# Patient Record
Sex: FEMALE | Race: WHITE | Employment: OTHER | URBAN - METROPOLITAN AREA
[De-identification: names, ages, dates, MRNs, and addresses within clinical notes are randomized per-mention and may not be internally consistent; named-entity substitution may affect disease eponyms.]

---

## 2017-01-01 ENCOUNTER — TELEPHONE (OUTPATIENT)
Dept: FAMILY MEDICINE CLINIC | Age: 62
End: 2017-01-01

## 2017-01-01 ENCOUNTER — OFFICE VISIT (OUTPATIENT)
Dept: FAMILY MEDICINE CLINIC | Age: 62
End: 2017-01-01
Payer: COMMERCIAL

## 2017-01-01 ENCOUNTER — HOSPITAL ENCOUNTER (OUTPATIENT)
Age: 62
Setting detail: OBSERVATION
Discharge: NURSING, REHAB OR LONG-TERM CARE | End: 2017-04-14
Attending: EMERGENCY MEDICINE | Admitting: INTERNAL MEDICINE
Payer: COMMERCIAL

## 2017-01-01 ENCOUNTER — CARE COORDINATION (OUTPATIENT)
Dept: CARE COORDINATION | Age: 62
End: 2017-01-01

## 2017-01-01 ENCOUNTER — HOSPITAL ENCOUNTER (OUTPATIENT)
Age: 62
Setting detail: SPECIMEN
Discharge: HOME | End: 2017-04-23
Payer: COMMERCIAL

## 2017-01-01 ENCOUNTER — HOSPITAL ENCOUNTER (OUTPATIENT)
Age: 62
Setting detail: SPECIMEN
Discharge: HOME | End: 2017-05-23
Payer: COMMERCIAL

## 2017-01-01 VITALS
TEMPERATURE: 97.4 F | SYSTOLIC BLOOD PRESSURE: 138 MMHG | BODY MASS INDEX: 51.91 KG/M2 | DIASTOLIC BLOOD PRESSURE: 78 MMHG | HEART RATE: 97 BPM | RESPIRATION RATE: 20 BRPM | OXYGEN SATURATION: 97 % | WEIGHT: 293 LBS | HEIGHT: 63 IN

## 2017-01-01 VITALS
OXYGEN SATURATION: 95 % | TEMPERATURE: 97.1 F | SYSTOLIC BLOOD PRESSURE: 104 MMHG | RESPIRATION RATE: 18 BRPM | HEIGHT: 63 IN | HEART RATE: 91 BPM | BODY MASS INDEX: 51.91 KG/M2 | WEIGHT: 293 LBS | DIASTOLIC BLOOD PRESSURE: 52 MMHG

## 2017-01-01 VITALS
SYSTOLIC BLOOD PRESSURE: 149 MMHG | OXYGEN SATURATION: 96 % | DIASTOLIC BLOOD PRESSURE: 76 MMHG | RESPIRATION RATE: 18 BRPM | BODY MASS INDEX: 51.91 KG/M2 | HEIGHT: 63 IN | WEIGHT: 293 LBS | TEMPERATURE: 98 F | HEART RATE: 83 BPM

## 2017-01-01 DIAGNOSIS — B49 FUNGAL DISEASE: ICD-10-CM

## 2017-01-01 DIAGNOSIS — L03.115 CELLULITIS OF RIGHT LOWER EXTREMITY: Primary | ICD-10-CM

## 2017-01-01 DIAGNOSIS — I89.0 LYMPHEDEMA: Primary | ICD-10-CM

## 2017-01-01 DIAGNOSIS — Z76.89 ENCOUNTER TO ESTABLISH CARE: ICD-10-CM

## 2017-01-01 DIAGNOSIS — Z13.9 SCREENING: ICD-10-CM

## 2017-01-01 DIAGNOSIS — I89.0 LYMPHEDEMA: ICD-10-CM

## 2017-01-01 DIAGNOSIS — E66.01 MORBID OBESITY, UNSPECIFIED OBESITY TYPE (HCC): ICD-10-CM

## 2017-01-01 DIAGNOSIS — E03.9 HYPOTHYROIDISM, UNSPECIFIED TYPE: Primary | ICD-10-CM

## 2017-01-01 LAB
ABSOLUTE BANDS #: 0.11 K/UL (ref 0–1)
ABSOLUTE EOS #: 0.11 K/UL (ref 0–0.4)
ABSOLUTE EOS #: 0.3 K/UL (ref 0–0.4)
ABSOLUTE EOS #: 0.4 K/UL (ref 0–0.4)
ABSOLUTE LYMPH #: 0.64 K/UL (ref 1–4.8)
ABSOLUTE LYMPH #: 1.3 K/UL (ref 1–4.8)
ABSOLUTE LYMPH #: 1.5 K/UL (ref 1–4.8)
ABSOLUTE MONO #: 0.8 K/UL (ref 0.1–1.3)
ABSOLUTE MONO #: 0.9 K/UL (ref 0.1–1.3)
ABSOLUTE MONO #: 0.96 K/UL (ref 0.1–1.3)
ALBUMIN SERPL-MCNC: 3 G/DL (ref 3.5–5.2)
ALBUMIN/GLOBULIN RATIO: ABNORMAL (ref 1–2.5)
ALP BLD-CCNC: 65 U/L (ref 35–104)
ALT SERPL-CCNC: 26 U/L (ref 5–33)
ANION GAP SERPL CALCULATED.3IONS-SCNC: 11 MMOL/L (ref 9–17)
ANION GAP SERPL CALCULATED.3IONS-SCNC: 12 MMOL/L (ref 9–17)
ANION GAP SERPL CALCULATED.3IONS-SCNC: 19 MMOL/L (ref 9–17)
ANION GAP SERPL CALCULATED.3IONS-SCNC: 22 MMOL/L (ref 9–17)
ANION GAP SERPL CALCULATED.3IONS-SCNC: 9 MMOL/L (ref 9–17)
AST SERPL-CCNC: 30 U/L
BANDS: 1 % (ref 0–10)
BASOPHILS # BLD: 1 % (ref 0–2)
BASOPHILS ABSOLUTE: 0.1 K/UL (ref 0–0.2)
BASOPHILS ABSOLUTE: 0.1 K/UL (ref 0–0.2)
BASOPHILS ABSOLUTE: 0.11 K/UL (ref 0–0.2)
BILIRUB SERPL-MCNC: 0.98 MG/DL (ref 0.3–1.2)
BNP INTERPRETATION: ABNORMAL
BUN BLDV-MCNC: 17 MG/DL (ref 8–23)
BUN BLDV-MCNC: 18 MG/DL (ref 8–23)
BUN BLDV-MCNC: 25 MG/DL (ref 8–23)
BUN BLDV-MCNC: 28 MG/DL (ref 8–23)
BUN BLDV-MCNC: 33 MG/DL (ref 8–23)
BUN/CREAT BLD: 20 (ref 9–20)
BUN/CREAT BLD: ABNORMAL (ref 9–20)
C-REACTIVE PROTEIN: 119.7 MG/L (ref 0–5)
C-REACTIVE PROTEIN: 140.9 MG/L (ref 0–5)
CALCIUM SERPL-MCNC: 7.4 MG/DL (ref 8.6–10.4)
CALCIUM SERPL-MCNC: 7.8 MG/DL (ref 8.6–10.4)
CALCIUM SERPL-MCNC: 8 MG/DL (ref 8.6–10.4)
CALCIUM SERPL-MCNC: 8.6 MG/DL (ref 8.6–10.4)
CALCIUM SERPL-MCNC: 9.4 MG/DL (ref 8.6–10.4)
CHLORIDE BLD-SCNC: 102 MMOL/L (ref 98–107)
CHLORIDE BLD-SCNC: 105 MMOL/L (ref 98–107)
CHLORIDE BLD-SCNC: 96 MMOL/L (ref 98–107)
CHOLESTEROL/HDL RATIO: 3.7
CHOLESTEROL: 167 MG/DL
CO2: 20 MMOL/L (ref 20–31)
CO2: 22 MMOL/L (ref 20–31)
CO2: 26 MMOL/L (ref 20–31)
CREAT SERPL-MCNC: 0.74 MG/DL (ref 0.5–0.9)
CREAT SERPL-MCNC: 0.78 MG/DL (ref 0.5–0.9)
CREAT SERPL-MCNC: 0.82 MG/DL (ref 0.5–0.9)
CREAT SERPL-MCNC: 0.87 MG/DL (ref 0.5–0.9)
CREAT SERPL-MCNC: 1.02 MG/DL (ref 0.5–0.9)
DIFFERENTIAL TYPE: ABNORMAL
EOSINOPHILS RELATIVE PERCENT: 1 % (ref 0–4)
EOSINOPHILS RELATIVE PERCENT: 5 % (ref 0–4)
EOSINOPHILS RELATIVE PERCENT: 5 % (ref 0–4)
ESTIMATED AVERAGE GLUCOSE: 100 MG/DL
GFR AFRICAN AMERICAN: >60 ML/MIN
GFR NON-AFRICAN AMERICAN: 55 ML/MIN
GFR NON-AFRICAN AMERICAN: >60 ML/MIN
GFR SERPL CREATININE-BSD FRML MDRD: ABNORMAL ML/MIN/{1.73_M2}
GLUCOSE BLD-MCNC: 104 MG/DL (ref 65–105)
GLUCOSE BLD-MCNC: 104 MG/DL (ref 65–105)
GLUCOSE BLD-MCNC: 104 MG/DL (ref 70–99)
GLUCOSE BLD-MCNC: 105 MG/DL (ref 65–105)
GLUCOSE BLD-MCNC: 105 MG/DL (ref 65–105)
GLUCOSE BLD-MCNC: 107 MG/DL (ref 70–99)
GLUCOSE BLD-MCNC: 108 MG/DL (ref 65–105)
GLUCOSE BLD-MCNC: 108 MG/DL (ref 70–99)
GLUCOSE BLD-MCNC: 111 MG/DL (ref 70–99)
GLUCOSE BLD-MCNC: 119 MG/DL (ref 65–105)
GLUCOSE BLD-MCNC: 125 MG/DL (ref 70–99)
GLUCOSE BLD-MCNC: 132 MG/DL (ref 65–105)
GLUCOSE BLD-MCNC: 189 MG/DL (ref 65–105)
GLUCOSE BLD-MCNC: 85 MG/DL (ref 65–105)
HBA1C MFR BLD: 5.1 % (ref 4–6)
HCT VFR BLD CALC: 31.8 % (ref 36–46)
HCT VFR BLD CALC: 32.1 % (ref 36–46)
HCT VFR BLD CALC: 34.6 % (ref 36–46)
HCT VFR BLD CALC: 38.5 % (ref 36–46)
HDLC SERPL-MCNC: 45 MG/DL
HEMOGLOBIN: 10.7 G/DL (ref 12–16)
HEMOGLOBIN: 10.7 G/DL (ref 12–16)
HEMOGLOBIN: 11.2 G/DL (ref 12–16)
HEMOGLOBIN: 12.9 G/DL (ref 12–16)
HEPATITIS C ANTIBODY: NONREACTIVE
HIV AG/AB: NONREACTIVE
LDL CHOLESTEROL: 97 MG/DL (ref 0–130)
LV EF: 55 %
LVEF MODALITY: NORMAL
LYMPHOCYTES # BLD: 16 % (ref 24–44)
LYMPHOCYTES # BLD: 25 % (ref 24–44)
LYMPHOCYTES # BLD: 6 % (ref 24–44)
MCH RBC QN AUTO: 28.2 PG (ref 26–34)
MCH RBC QN AUTO: 28.6 PG (ref 26–34)
MCH RBC QN AUTO: 28.7 PG (ref 26–34)
MCH RBC QN AUTO: 28.8 PG (ref 26–34)
MCHC RBC AUTO-ENTMCNC: 32.3 G/DL (ref 31–37)
MCHC RBC AUTO-ENTMCNC: 33.3 G/DL (ref 31–37)
MCHC RBC AUTO-ENTMCNC: 33.5 G/DL (ref 31–37)
MCHC RBC AUTO-ENTMCNC: 33.6 G/DL (ref 31–37)
MCV RBC AUTO: 85.4 FL (ref 80–100)
MCV RBC AUTO: 85.8 FL (ref 80–100)
MCV RBC AUTO: 86.4 FL (ref 80–100)
MCV RBC AUTO: 87.3 FL (ref 80–100)
MONOCYTES # BLD: 11 % (ref 1–7)
MONOCYTES # BLD: 13 % (ref 1–7)
MONOCYTES # BLD: 9 % (ref 1–7)
MORPHOLOGY: NORMAL
PDW BLD-RTO: 12.6 % (ref 11.5–14.5)
PDW BLD-RTO: 13 % (ref 11.5–14.9)
PDW BLD-RTO: 13.1 % (ref 11.5–14.9)
PDW BLD-RTO: 13.2 % (ref 11.5–14.9)
PLATELET # BLD: 205 K/UL (ref 130–400)
PLATELET # BLD: 209 K/UL (ref 150–450)
PLATELET # BLD: 220 K/UL (ref 150–450)
PLATELET # BLD: 256 K/UL (ref 150–450)
PLATELET ESTIMATE: ABNORMAL
PMV BLD AUTO: 8.3 FL (ref 6–12)
PMV BLD AUTO: 8.5 FL (ref 6–12)
PMV BLD AUTO: 8.7 FL (ref 6–12)
PMV BLD AUTO: ABNORMAL FL (ref 6–12)
POTASSIUM SERPL-SCNC: 3.3 MMOL/L (ref 3.7–5.3)
POTASSIUM SERPL-SCNC: 3.4 MMOL/L (ref 3.7–5.3)
POTASSIUM SERPL-SCNC: 3.6 MMOL/L (ref 3.7–5.3)
POTASSIUM SERPL-SCNC: 4.1 MMOL/L (ref 3.7–5.3)
POTASSIUM SERPL-SCNC: 4.1 MMOL/L (ref 3.7–5.3)
PRO-BNP: 690 PG/ML
RBC # BLD: 3.71 M/UL (ref 4–5.2)
RBC # BLD: 3.71 M/UL (ref 4–5.2)
RBC # BLD: 3.96 M/UL (ref 4–5.2)
RBC # BLD: 4.51 M/UL (ref 4–5.2)
RBC # BLD: ABNORMAL 10*6/UL
SEG NEUTROPHILS: 56 % (ref 36–66)
SEG NEUTROPHILS: 67 % (ref 36–66)
SEG NEUTROPHILS: 82 % (ref 36–66)
SEGMENTED NEUTROPHILS ABSOLUTE COUNT: 3.5 K/UL (ref 1.3–9.1)
SEGMENTED NEUTROPHILS ABSOLUTE COUNT: 5.3 K/UL (ref 1.3–9.1)
SEGMENTED NEUTROPHILS ABSOLUTE COUNT: 8.77 K/UL (ref 1.3–9.1)
SODIUM BLD-SCNC: 138 MMOL/L (ref 135–144)
SODIUM BLD-SCNC: 139 MMOL/L (ref 135–144)
SODIUM BLD-SCNC: 140 MMOL/L (ref 135–144)
SODIUM BLD-SCNC: 140 MMOL/L (ref 135–144)
SODIUM BLD-SCNC: 143 MMOL/L (ref 135–144)
TOTAL PROTEIN: 7 G/DL (ref 6.4–8.3)
TRIGL SERPL-MCNC: 127 MG/DL
TSH SERPL DL<=0.05 MIU/L-ACNC: 7.28 MIU/L (ref 0.3–5)
VANCOMYCIN TROUGH DATE LAST DOSE: NORMAL
VANCOMYCIN TROUGH DOSE AMOUNT: NORMAL
VANCOMYCIN TROUGH TIME LAST DOSE: 1708
VANCOMYCIN TROUGH: 19.8 UG/ML (ref 10–20)
VLDLC SERPL CALC-MCNC: NORMAL MG/DL (ref 1–30)
WBC # BLD: 10.7 K/UL (ref 3.5–11)
WBC # BLD: 6.2 K/UL (ref 3.5–11)
WBC # BLD: 6.6 K/UL (ref 3.5–11)
WBC # BLD: 7.8 K/UL (ref 3.5–11)
WBC # BLD: ABNORMAL 10*3/UL

## 2017-01-01 PROCEDURE — 6370000000 HC RX 637 (ALT 250 FOR IP): Performed by: HOSPITALIST

## 2017-01-01 PROCEDURE — 99251 PR INITL INPATIENT CONSULT NEW/ESTAB PT 20 MIN: CPT | Performed by: NURSE PRACTITIONER

## 2017-01-01 PROCEDURE — 85025 COMPLETE CBC W/AUTO DIFF WBC: CPT

## 2017-01-01 PROCEDURE — 3014F SCREEN MAMMO DOC REV: CPT | Performed by: NURSE PRACTITIONER

## 2017-01-01 PROCEDURE — G8427 DOCREV CUR MEDS BY ELIG CLIN: HCPCS | Performed by: NURSE PRACTITIONER

## 2017-01-01 PROCEDURE — P9603 ONE-WAY ALLOW PRORATED MILES: HCPCS

## 2017-01-01 PROCEDURE — 96366 THER/PROPH/DIAG IV INF ADDON: CPT

## 2017-01-01 PROCEDURE — 83036 HEMOGLOBIN GLYCOSYLATED A1C: CPT

## 2017-01-01 PROCEDURE — 82947 ASSAY GLUCOSE BLOOD QUANT: CPT

## 2017-01-01 PROCEDURE — 1036F TOBACCO NON-USER: CPT | Performed by: NURSE PRACTITIONER

## 2017-01-01 PROCEDURE — 3017F COLORECTAL CA SCREEN DOC REV: CPT | Performed by: NURSE PRACTITIONER

## 2017-01-01 PROCEDURE — 6360000002 HC RX W HCPCS: Performed by: HOSPITALIST

## 2017-01-01 PROCEDURE — 96372 THER/PROPH/DIAG INJ SC/IM: CPT

## 2017-01-01 PROCEDURE — G8417 CALC BMI ABV UP PARAM F/U: HCPCS | Performed by: NURSE PRACTITIONER

## 2017-01-01 PROCEDURE — 99225 PR SBSQ OBSERVATION CARE/DAY 25 MINUTES: CPT | Performed by: INTERNAL MEDICINE

## 2017-01-01 PROCEDURE — 86140 C-REACTIVE PROTEIN: CPT

## 2017-01-01 PROCEDURE — G0378 HOSPITAL OBSERVATION PER HR: HCPCS

## 2017-01-01 PROCEDURE — 97162 PT EVAL MOD COMPLEX 30 MIN: CPT

## 2017-01-01 PROCEDURE — 97110 THERAPEUTIC EXERCISES: CPT

## 2017-01-01 PROCEDURE — 99220 PR INITIAL OBSERVATION CARE/DAY 70 MINUTES: CPT | Performed by: INTERNAL MEDICINE

## 2017-01-01 PROCEDURE — G8979 MOBILITY GOAL STATUS: HCPCS

## 2017-01-01 PROCEDURE — 99217 PR OBSERVATION CARE DISCHARGE MANAGEMENT: CPT | Performed by: INTERNAL MEDICINE

## 2017-01-01 PROCEDURE — 2580000003 HC RX 258: Performed by: HOSPITALIST

## 2017-01-01 PROCEDURE — 6370000000 HC RX 637 (ALT 250 FOR IP): Performed by: INTERNAL MEDICINE

## 2017-01-01 PROCEDURE — G8988 SELF CARE GOAL STATUS: HCPCS

## 2017-01-01 PROCEDURE — 93970 EXTREMITY STUDY: CPT

## 2017-01-01 PROCEDURE — 97166 OT EVAL MOD COMPLEX 45 MIN: CPT

## 2017-01-01 PROCEDURE — 85027 COMPLETE CBC AUTOMATED: CPT

## 2017-01-01 PROCEDURE — 36415 COLL VENOUS BLD VENIPUNCTURE: CPT

## 2017-01-01 PROCEDURE — 2500000003 HC RX 250 WO HCPCS: Performed by: INTERNAL MEDICINE

## 2017-01-01 PROCEDURE — G8987 SELF CARE CURRENT STATUS: HCPCS

## 2017-01-01 PROCEDURE — 99231 SBSQ HOSP IP/OBS SF/LOW 25: CPT | Performed by: NURSE PRACTITIONER

## 2017-01-01 PROCEDURE — 80048 BASIC METABOLIC PNL TOTAL CA: CPT

## 2017-01-01 PROCEDURE — 6360000002 HC RX W HCPCS: Performed by: INTERNAL MEDICINE

## 2017-01-01 PROCEDURE — 99204 OFFICE O/P NEW MOD 45 MIN: CPT | Performed by: NURSE PRACTITIONER

## 2017-01-01 PROCEDURE — 96375 TX/PRO/DX INJ NEW DRUG ADDON: CPT

## 2017-01-01 PROCEDURE — 96376 TX/PRO/DX INJ SAME DRUG ADON: CPT

## 2017-01-01 PROCEDURE — 80053 COMPREHEN METABOLIC PANEL: CPT

## 2017-01-01 PROCEDURE — 96367 TX/PROPH/DG ADDL SEQ IV INF: CPT

## 2017-01-01 PROCEDURE — 99213 OFFICE O/P EST LOW 20 MIN: CPT | Performed by: NURSE PRACTITIONER

## 2017-01-01 PROCEDURE — 83880 ASSAY OF NATRIURETIC PEPTIDE: CPT

## 2017-01-01 PROCEDURE — 93306 TTE W/DOPPLER COMPLETE: CPT

## 2017-01-01 PROCEDURE — 80202 ASSAY OF VANCOMYCIN: CPT

## 2017-01-01 PROCEDURE — 96365 THER/PROPH/DIAG IV INF INIT: CPT

## 2017-01-01 PROCEDURE — 99284 EMERGENCY DEPT VISIT MOD MDM: CPT

## 2017-01-01 PROCEDURE — 2500000003 HC RX 250 WO HCPCS: Performed by: EMERGENCY MEDICINE

## 2017-01-01 PROCEDURE — 2580000003 HC RX 258: Performed by: EMERGENCY MEDICINE

## 2017-01-01 PROCEDURE — G8978 MOBILITY CURRENT STATUS: HCPCS

## 2017-01-01 RX ORDER — SODIUM CHLORIDE 0.9 % (FLUSH) 0.9 %
10 SYRINGE (ML) INJECTION PRN
Status: DISCONTINUED | OUTPATIENT
Start: 2017-01-01 | End: 2017-01-01

## 2017-01-01 RX ORDER — FUROSEMIDE 20 MG/1
TABLET ORAL
Qty: 90 TABLET | Refills: 1 | Status: SHIPPED | OUTPATIENT
Start: 2017-01-01

## 2017-01-01 RX ORDER — HYDROCODONE BITARTRATE AND ACETAMINOPHEN 5; 325 MG/1; MG/1
2 TABLET ORAL EVERY 4 HOURS PRN
Status: DISCONTINUED | OUTPATIENT
Start: 2017-01-01 | End: 2017-01-01

## 2017-01-01 RX ORDER — FUROSEMIDE 20 MG/1
TABLET ORAL
Qty: 30 TABLET | Refills: 2 | Status: SHIPPED | OUTPATIENT
Start: 2017-01-01 | End: 2017-01-01 | Stop reason: SDUPTHER

## 2017-01-01 RX ORDER — ONDANSETRON 2 MG/ML
4 INJECTION INTRAMUSCULAR; INTRAVENOUS EVERY 6 HOURS PRN
Status: DISCONTINUED | OUTPATIENT
Start: 2017-01-01 | End: 2017-01-01 | Stop reason: HOSPADM

## 2017-01-01 RX ORDER — ACETAMINOPHEN 325 MG/1
650 TABLET ORAL EVERY 4 HOURS PRN
Status: DISCONTINUED | OUTPATIENT
Start: 2017-01-01 | End: 2017-01-01 | Stop reason: HOSPADM

## 2017-01-01 RX ORDER — POTASSIUM CHLORIDE 20 MEQ/1
40 TABLET, EXTENDED RELEASE ORAL PRN
Status: DISCONTINUED | OUTPATIENT
Start: 2017-01-01 | End: 2017-01-01 | Stop reason: HOSPADM

## 2017-01-01 RX ORDER — DOXYCYCLINE HYCLATE 100 MG
100 TABLET ORAL 2 TIMES DAILY
Qty: 14 TABLET | Refills: 0 | Status: SHIPPED | OUTPATIENT
Start: 2017-01-01 | End: 2017-01-01

## 2017-01-01 RX ORDER — ACETAMINOPHEN 325 MG/1
650 TABLET ORAL EVERY 4 HOURS PRN
Status: DISCONTINUED | OUTPATIENT
Start: 2017-01-01 | End: 2017-01-01

## 2017-01-01 RX ORDER — SODIUM CHLORIDE 9 MG/ML
INJECTION, SOLUTION INTRAVENOUS CONTINUOUS
Status: DISCONTINUED | OUTPATIENT
Start: 2017-01-01 | End: 2017-01-01 | Stop reason: HOSPADM

## 2017-01-01 RX ORDER — NICOTINE POLACRILEX 4 MG
15 LOZENGE BUCCAL PRN
Status: DISCONTINUED | OUTPATIENT
Start: 2017-01-01 | End: 2017-01-01 | Stop reason: HOSPADM

## 2017-01-01 RX ORDER — LEVOTHYROXINE SODIUM 0.03 MG/1
25 TABLET ORAL DAILY
Qty: 30 TABLET | Refills: 3 | Status: SHIPPED | OUTPATIENT
Start: 2017-01-01 | End: 2018-01-01

## 2017-01-01 RX ORDER — POTASSIUM CHLORIDE 20MEQ/15ML
40 LIQUID (ML) ORAL PRN
Status: DISCONTINUED | OUTPATIENT
Start: 2017-01-01 | End: 2017-01-01 | Stop reason: HOSPADM

## 2017-01-01 RX ORDER — FUROSEMIDE 20 MG/1
20 TABLET ORAL DAILY
Qty: 90 TABLET | Refills: 1 | Status: SHIPPED | OUTPATIENT
Start: 2017-01-01 | End: 2017-01-01 | Stop reason: SDUPTHER

## 2017-01-01 RX ORDER — DEXTROSE MONOHYDRATE 50 MG/ML
100 INJECTION, SOLUTION INTRAVENOUS PRN
Status: DISCONTINUED | OUTPATIENT
Start: 2017-01-01 | End: 2017-01-01 | Stop reason: HOSPADM

## 2017-01-01 RX ORDER — POTASSIUM CHLORIDE 7.45 MG/ML
10 INJECTION INTRAVENOUS PRN
Status: DISCONTINUED | OUTPATIENT
Start: 2017-01-01 | End: 2017-01-01 | Stop reason: HOSPADM

## 2017-01-01 RX ORDER — NYSTATIN 100000 U/G
CREAM TOPICAL 2 TIMES DAILY
Status: DISCONTINUED | OUTPATIENT
Start: 2017-01-01 | End: 2017-01-01 | Stop reason: HOSPADM

## 2017-01-01 RX ORDER — MORPHINE SULFATE 4 MG/ML
4 INJECTION, SOLUTION INTRAMUSCULAR; INTRAVENOUS
Status: DISCONTINUED | OUTPATIENT
Start: 2017-01-01 | End: 2017-01-01

## 2017-01-01 RX ORDER — MORPHINE SULFATE 2 MG/ML
2 INJECTION, SOLUTION INTRAMUSCULAR; INTRAVENOUS
Status: DISCONTINUED | OUTPATIENT
Start: 2017-01-01 | End: 2017-01-01

## 2017-01-01 RX ORDER — BISACODYL 10 MG
10 SUPPOSITORY, RECTAL RECTAL DAILY PRN
Status: DISCONTINUED | OUTPATIENT
Start: 2017-01-01 | End: 2017-01-01 | Stop reason: HOSPADM

## 2017-01-01 RX ORDER — POTASSIUM CHLORIDE 1500 MG/1
TABLET, FILM COATED, EXTENDED RELEASE ORAL
Qty: 30 TABLET | Refills: 2 | Status: ON HOLD | OUTPATIENT
Start: 2017-01-01 | End: 2018-01-01 | Stop reason: HOSPADM

## 2017-01-01 RX ORDER — POTASSIUM CHLORIDE 20 MEQ/1
40 TABLET, EXTENDED RELEASE ORAL PRN
Status: DISCONTINUED | OUTPATIENT
Start: 2017-01-01 | End: 2017-01-01 | Stop reason: SDUPTHER

## 2017-01-01 RX ORDER — POTASSIUM CHLORIDE 20 MEQ/1
20 TABLET, EXTENDED RELEASE ORAL DAILY
Qty: 90 TABLET | Refills: 1 | Status: SHIPPED | OUTPATIENT
Start: 2017-01-01 | End: 2017-01-01 | Stop reason: SDUPTHER

## 2017-01-01 RX ORDER — DEXTROSE MONOHYDRATE 25 G/50ML
12.5 INJECTION, SOLUTION INTRAVENOUS PRN
Status: DISCONTINUED | OUTPATIENT
Start: 2017-01-01 | End: 2017-01-01 | Stop reason: HOSPADM

## 2017-01-01 RX ORDER — POTASSIUM CHLORIDE 20MEQ/15ML
40 LIQUID (ML) ORAL PRN
Status: DISCONTINUED | OUTPATIENT
Start: 2017-01-01 | End: 2017-01-01 | Stop reason: SDUPTHER

## 2017-01-01 RX ORDER — POTASSIUM CHLORIDE 7.45 MG/ML
10 INJECTION INTRAVENOUS PRN
Status: DISCONTINUED | OUTPATIENT
Start: 2017-01-01 | End: 2017-01-01 | Stop reason: SDUPTHER

## 2017-01-01 RX ORDER — MAGNESIUM SULFATE 1 G/100ML
1 INJECTION INTRAVENOUS PRN
Status: DISCONTINUED | OUTPATIENT
Start: 2017-01-01 | End: 2017-01-01 | Stop reason: HOSPADM

## 2017-01-01 RX ORDER — FUROSEMIDE 10 MG/ML
20 INJECTION INTRAMUSCULAR; INTRAVENOUS DAILY
Status: DISCONTINUED | OUTPATIENT
Start: 2017-01-01 | End: 2017-01-01 | Stop reason: HOSPADM

## 2017-01-01 RX ORDER — POTASSIUM CHLORIDE 20 MEQ/1
TABLET, EXTENDED RELEASE ORAL
Qty: 90 TABLET | Refills: 1 | Status: SHIPPED | OUTPATIENT
Start: 2017-01-01

## 2017-01-01 RX ORDER — POTASSIUM CHLORIDE 20 MEQ/1
20 TABLET, EXTENDED RELEASE ORAL DAILY
Qty: 60 TABLET | Refills: 3 | Status: SHIPPED | OUTPATIENT
Start: 2017-01-01 | End: 2017-01-01 | Stop reason: SDUPTHER

## 2017-01-01 RX ORDER — FUROSEMIDE 20 MG/1
20 TABLET ORAL DAILY
Qty: 60 TABLET | Refills: 3 | Status: SHIPPED | OUTPATIENT
Start: 2017-01-01 | End: 2017-01-01 | Stop reason: SDUPTHER

## 2017-01-01 RX ORDER — SODIUM CHLORIDE 0.9 % (FLUSH) 0.9 %
10 SYRINGE (ML) INJECTION EVERY 12 HOURS SCHEDULED
Status: DISCONTINUED | OUTPATIENT
Start: 2017-01-01 | End: 2017-01-01

## 2017-01-01 RX ORDER — HYDROCODONE BITARTRATE AND ACETAMINOPHEN 5; 325 MG/1; MG/1
1 TABLET ORAL EVERY 4 HOURS PRN
Status: DISCONTINUED | OUTPATIENT
Start: 2017-01-01 | End: 2017-01-01

## 2017-01-01 RX ADMIN — FUROSEMIDE 20 MG: 10 INJECTION, SOLUTION INTRAVENOUS at 08:53

## 2017-01-01 RX ADMIN — VANCOMYCIN HYDROCHLORIDE 1500 MG: 5 INJECTION, POWDER, LYOPHILIZED, FOR SOLUTION INTRAVENOUS at 16:53

## 2017-01-01 RX ADMIN — POTASSIUM CHLORIDE 40 MEQ: 20 TABLET, EXTENDED RELEASE ORAL at 09:40

## 2017-01-01 RX ADMIN — MICONAZOLE NITRATE: 1.42 POWDER TOPICAL at 23:00

## 2017-01-01 RX ADMIN — VANCOMYCIN HYDROCHLORIDE 1500 MG: 5 INJECTION, POWDER, LYOPHILIZED, FOR SOLUTION INTRAVENOUS at 17:08

## 2017-01-01 RX ADMIN — VANCOMYCIN HYDROCHLORIDE 1500 MG: 5 INJECTION, POWDER, LYOPHILIZED, FOR SOLUTION INTRAVENOUS at 06:11

## 2017-01-01 RX ADMIN — INSULIN LISPRO 1 UNITS: 100 INJECTION, SOLUTION INTRAVENOUS; SUBCUTANEOUS at 21:35

## 2017-01-01 RX ADMIN — ENOXAPARIN SODIUM 40 MG: 40 INJECTION SUBCUTANEOUS at 22:59

## 2017-01-01 RX ADMIN — DEXTROSE MONOHYDRATE 900 MG: 50 INJECTION, SOLUTION INTRAVENOUS at 13:57

## 2017-01-01 RX ADMIN — ENOXAPARIN SODIUM 40 MG: 40 INJECTION SUBCUTANEOUS at 09:40

## 2017-01-01 RX ADMIN — ENOXAPARIN SODIUM 40 MG: 40 INJECTION SUBCUTANEOUS at 21:36

## 2017-01-01 RX ADMIN — FUROSEMIDE 20 MG: 10 INJECTION, SOLUTION INTRAVENOUS at 16:29

## 2017-01-01 RX ADMIN — ENOXAPARIN SODIUM 40 MG: 40 INJECTION SUBCUTANEOUS at 08:54

## 2017-01-01 RX ADMIN — NYSTATIN: 100000 CREAM TOPICAL at 09:43

## 2017-01-01 RX ADMIN — VANCOMYCIN HYDROCHLORIDE 1500 MG: 5 INJECTION, POWDER, LYOPHILIZED, FOR SOLUTION INTRAVENOUS at 05:02

## 2017-01-01 RX ADMIN — SODIUM CHLORIDE: 9 INJECTION, SOLUTION INTRAVENOUS at 23:06

## 2017-01-01 RX ADMIN — ACETAMINOPHEN 650 MG: 325 TABLET ORAL at 18:26

## 2017-01-01 RX ADMIN — SODIUM CHLORIDE: 9 INJECTION, SOLUTION INTRAVENOUS at 16:27

## 2017-01-01 RX ADMIN — MICONAZOLE NITRATE: 1.42 POWDER TOPICAL at 08:56

## 2017-01-01 RX ADMIN — MICONAZOLE NITRATE: 1.42 POWDER TOPICAL at 22:09

## 2017-01-01 RX ADMIN — NYSTATIN: 100000 CREAM TOPICAL at 22:09

## 2017-01-01 RX ADMIN — POTASSIUM CHLORIDE 40 MEQ: 20 TABLET, EXTENDED RELEASE ORAL at 16:25

## 2017-01-01 RX ADMIN — NYSTATIN: 100000 CREAM TOPICAL at 23:01

## 2017-01-01 RX ADMIN — MICONAZOLE NITRATE: 1.42 POWDER TOPICAL at 09:43

## 2017-01-01 RX ADMIN — SODIUM CHLORIDE: 9 INJECTION, SOLUTION INTRAVENOUS at 16:26

## 2017-01-01 ASSESSMENT — PAIN SCALES - GENERAL
PAINLEVEL_OUTOF10: 2
PAINLEVEL_OUTOF10: 0
PAINLEVEL_OUTOF10: 2
PAINLEVEL_OUTOF10: 3

## 2017-01-01 ASSESSMENT — PATIENT HEALTH QUESTIONNAIRE - PHQ9
SUM OF ALL RESPONSES TO PHQ QUESTIONS 1-9: 0
1. LITTLE INTEREST OR PLEASURE IN DOING THINGS: 0
SUM OF ALL RESPONSES TO PHQ9 QUESTIONS 1 & 2: 0
2. FEELING DOWN, DEPRESSED OR HOPELESS: 0

## 2017-01-01 ASSESSMENT — PAIN DESCRIPTION - LOCATION
LOCATION: KNEE
LOCATION: KNEE
LOCATION: KNEE;HIP
LOCATION: LEG
LOCATION: GENERALIZED

## 2017-01-01 ASSESSMENT — ENCOUNTER SYMPTOMS
BLOOD IN STOOL: 0
EYE DISCHARGE: 0
RHINORRHEA: 0
ABDOMINAL PAIN: 0
SHORTNESS OF BREATH: 0
CHEST TIGHTNESS: 0
EYE PAIN: 0
NAUSEA: 0
ABDOMINAL PAIN: 0
COUGH: 0
COUGH: 0
DIARRHEA: 0
RHINORRHEA: 0
CHEST TIGHTNESS: 0
BACK PAIN: 0
SINUS PRESSURE: 0
SORE THROAT: 0
WHEEZING: 0
DIARRHEA: 1
CONSTIPATION: 0
EYE DISCHARGE: 0
RHINORRHEA: 0
SINUS PRESSURE: 0
ABDOMINAL PAIN: 0
SHORTNESS OF BREATH: 0
COUGH: 0
SHORTNESS OF BREATH: 0
BLOOD IN STOOL: 0
VOMITING: 0
EYE REDNESS: 0

## 2017-01-01 ASSESSMENT — PAIN DESCRIPTION - FREQUENCY
FREQUENCY: INTERMITTENT
FREQUENCY: INTERMITTENT

## 2017-01-01 ASSESSMENT — PAIN DESCRIPTION - PAIN TYPE
TYPE: ACUTE PAIN
TYPE: CHRONIC PAIN;ACUTE PAIN

## 2017-01-01 ASSESSMENT — PAIN DESCRIPTION - DESCRIPTORS
DESCRIPTORS: ACHING

## 2017-01-01 ASSESSMENT — PAIN DESCRIPTION - ORIENTATION
ORIENTATION: RIGHT
ORIENTATION: RIGHT;LEFT
ORIENTATION: LEFT;RIGHT
ORIENTATION: RIGHT
ORIENTATION: RIGHT

## 2017-04-12 PROBLEM — E66.01 MORBID OBESITY (HCC): Status: ACTIVE | Noted: 2017-01-01

## 2017-04-12 PROBLEM — R23.9 ALTERATION IN SKIN INTEGRITY DUE TO MOISTURE: Status: ACTIVE | Noted: 2017-01-01

## 2017-04-12 PROBLEM — L03.90 CELLULITIS: Status: ACTIVE | Noted: 2017-01-01

## 2017-04-12 PROBLEM — R68.89 SUSPECTED DEEP TISSUE INJURY: Status: ACTIVE | Noted: 2017-01-01

## 2017-04-12 PROBLEM — L03.115 CELLULITIS OF RIGHT LOWER EXTREMITY: Status: ACTIVE | Noted: 2017-01-01

## 2017-05-24 PROBLEM — E03.9 HYPOTHYROIDISM: Status: ACTIVE | Noted: 2017-01-01

## 2018-01-01 ENCOUNTER — TELEPHONE (OUTPATIENT)
Dept: ONCOLOGY | Age: 63
End: 2018-01-01

## 2018-01-01 ENCOUNTER — HOSPITAL ENCOUNTER (OUTPATIENT)
Facility: MEDICAL CENTER | Age: 63
Discharge: HOME | End: 2018-03-21
Payer: COMMERCIAL

## 2018-01-01 ENCOUNTER — APPOINTMENT (OUTPATIENT)
Dept: ULTRASOUND IMAGING | Age: 63
DRG: 754 | End: 2018-01-01
Payer: COMMERCIAL

## 2018-01-01 ENCOUNTER — HOSPITAL ENCOUNTER (OUTPATIENT)
Facility: MEDICAL CENTER | Age: 63
End: 2018-01-01

## 2018-01-01 ENCOUNTER — APPOINTMENT (OUTPATIENT)
Dept: CT IMAGING | Age: 63
DRG: 755 | End: 2018-01-01
Payer: COMMERCIAL

## 2018-01-01 ENCOUNTER — ANESTHESIA (OUTPATIENT)
Dept: OPERATING ROOM | Age: 63
DRG: 754 | End: 2018-01-01
Payer: COMMERCIAL

## 2018-01-01 ENCOUNTER — APPOINTMENT (OUTPATIENT)
Dept: GENERAL RADIOLOGY | Age: 63
DRG: 754 | End: 2018-01-01
Payer: COMMERCIAL

## 2018-01-01 ENCOUNTER — APPOINTMENT (OUTPATIENT)
Dept: INTERVENTIONAL RADIOLOGY/VASCULAR | Age: 63
DRG: 754 | End: 2018-01-01
Payer: COMMERCIAL

## 2018-01-01 ENCOUNTER — OFFICE VISIT (OUTPATIENT)
Dept: ONCOLOGY | Age: 63
End: 2018-01-01
Payer: COMMERCIAL

## 2018-01-01 ENCOUNTER — HOSPITAL ENCOUNTER (INPATIENT)
Age: 63
LOS: 3 days | Discharge: HOME | DRG: 755 | End: 2018-02-17
Attending: EMERGENCY MEDICINE | Admitting: INTERNAL MEDICINE
Payer: COMMERCIAL

## 2018-01-01 ENCOUNTER — HOSPITAL ENCOUNTER (OUTPATIENT)
Facility: MEDICAL CENTER | Age: 63
End: 2018-01-01
Payer: COMMERCIAL

## 2018-01-01 ENCOUNTER — HOSPITAL ENCOUNTER (INPATIENT)
Age: 63
LOS: 2 days | Discharge: HOME | DRG: 755 | End: 2018-01-25
Attending: EMERGENCY MEDICINE | Admitting: INTERNAL MEDICINE
Payer: COMMERCIAL

## 2018-01-01 ENCOUNTER — TELEPHONE (OUTPATIENT)
Dept: FAMILY MEDICINE CLINIC | Age: 63
End: 2018-01-01

## 2018-01-01 ENCOUNTER — APPOINTMENT (OUTPATIENT)
Dept: ULTRASOUND IMAGING | Age: 63
DRG: 755 | End: 2018-01-01
Payer: COMMERCIAL

## 2018-01-01 ENCOUNTER — ANESTHESIA EVENT (OUTPATIENT)
Dept: INTERVENTIONAL RADIOLOGY/VASCULAR | Age: 63
DRG: 754 | End: 2018-01-01
Payer: COMMERCIAL

## 2018-01-01 ENCOUNTER — APPOINTMENT (OUTPATIENT)
Dept: DIALYSIS | Age: 63
DRG: 754 | End: 2018-01-01
Payer: COMMERCIAL

## 2018-01-01 ENCOUNTER — APPOINTMENT (OUTPATIENT)
Dept: GENERAL RADIOLOGY | Age: 63
DRG: 755 | End: 2018-01-01
Payer: COMMERCIAL

## 2018-01-01 ENCOUNTER — APPOINTMENT (OUTPATIENT)
Dept: INTERVENTIONAL RADIOLOGY/VASCULAR | Age: 63
DRG: 755 | End: 2018-01-01
Payer: COMMERCIAL

## 2018-01-01 ENCOUNTER — ANESTHESIA (OUTPATIENT)
Dept: INTERVENTIONAL RADIOLOGY/VASCULAR | Age: 63
DRG: 754 | End: 2018-01-01
Payer: COMMERCIAL

## 2018-01-01 ENCOUNTER — ANESTHESIA EVENT (OUTPATIENT)
Dept: OPERATING ROOM | Age: 63
DRG: 754 | End: 2018-01-01
Payer: COMMERCIAL

## 2018-01-01 ENCOUNTER — HOSPITAL ENCOUNTER (INPATIENT)
Age: 63
LOS: 20 days | Discharge: NURSING, REHAB OR LONG-TERM CARE | DRG: 754 | End: 2018-03-14
Attending: EMERGENCY MEDICINE | Admitting: INTERNAL MEDICINE
Payer: COMMERCIAL

## 2018-01-01 ENCOUNTER — TELEPHONE (OUTPATIENT)
Dept: INFUSION THERAPY | Age: 63
End: 2018-01-01

## 2018-01-01 ENCOUNTER — HOSPITAL ENCOUNTER (OUTPATIENT)
Age: 63
Setting detail: SPECIMEN
Discharge: HOME | End: 2018-03-20
Payer: COMMERCIAL

## 2018-01-01 VITALS — TEMPERATURE: 97.8 F | DIASTOLIC BLOOD PRESSURE: 90 MMHG | OXYGEN SATURATION: 100 % | SYSTOLIC BLOOD PRESSURE: 117 MMHG

## 2018-01-01 VITALS
HEIGHT: 63 IN | OXYGEN SATURATION: 100 % | WEIGHT: 293 LBS | BODY MASS INDEX: 51.91 KG/M2 | TEMPERATURE: 97.7 F | RESPIRATION RATE: 16 BRPM | DIASTOLIC BLOOD PRESSURE: 57 MMHG | SYSTOLIC BLOOD PRESSURE: 119 MMHG | HEART RATE: 104 BPM

## 2018-01-01 VITALS
OXYGEN SATURATION: 100 % | WEIGHT: 293 LBS | SYSTOLIC BLOOD PRESSURE: 96 MMHG | DIASTOLIC BLOOD PRESSURE: 52 MMHG | RESPIRATION RATE: 16 BRPM | HEART RATE: 102 BPM | BODY MASS INDEX: 51.91 KG/M2 | HEIGHT: 63 IN | TEMPERATURE: 98.5 F

## 2018-01-01 VITALS — TEMPERATURE: 96.8 F | DIASTOLIC BLOOD PRESSURE: 85 MMHG | OXYGEN SATURATION: 100 % | SYSTOLIC BLOOD PRESSURE: 97 MMHG

## 2018-01-01 VITALS
BODY MASS INDEX: 51.91 KG/M2 | HEART RATE: 111 BPM | RESPIRATION RATE: 17 BRPM | SYSTOLIC BLOOD PRESSURE: 120 MMHG | TEMPERATURE: 97.7 F | OXYGEN SATURATION: 99 % | WEIGHT: 293 LBS | DIASTOLIC BLOOD PRESSURE: 72 MMHG | HEIGHT: 63 IN

## 2018-01-01 VITALS
DIASTOLIC BLOOD PRESSURE: 65 MMHG | HEART RATE: 86 BPM | SYSTOLIC BLOOD PRESSURE: 100 MMHG | RESPIRATION RATE: 20 BRPM | TEMPERATURE: 97 F

## 2018-01-01 VITALS — SYSTOLIC BLOOD PRESSURE: 94 MMHG | OXYGEN SATURATION: 100 % | DIASTOLIC BLOOD PRESSURE: 58 MMHG | TEMPERATURE: 97.9 F

## 2018-01-01 DIAGNOSIS — N17.9 AKI (ACUTE KIDNEY INJURY) (HCC): Primary | ICD-10-CM

## 2018-01-01 DIAGNOSIS — I82.4Y9 ACUTE DEEP VEIN THROMBOSIS (DVT) OF PROXIMAL VEIN OF LOWER EXTREMITY, UNSPECIFIED LATERALITY (HCC): ICD-10-CM

## 2018-01-01 DIAGNOSIS — R06.00 DYSPNEA AND RESPIRATORY ABNORMALITIES: Primary | ICD-10-CM

## 2018-01-01 DIAGNOSIS — A04.72 CLOSTRIDIUM DIFFICILE DIARRHEA: Primary | ICD-10-CM

## 2018-01-01 DIAGNOSIS — C56.9 MALIGNANT NEOPLASM OF OVARY, UNSPECIFIED LATERALITY (HCC): ICD-10-CM

## 2018-01-01 DIAGNOSIS — B37.49 CANDIDA UTI: ICD-10-CM

## 2018-01-01 DIAGNOSIS — C78.6 SECONDARY MALIGNANT NEOPLASM OF RETROPERITONEUM AND PERITONEUM (HCC): Primary | ICD-10-CM

## 2018-01-01 DIAGNOSIS — R18.8 OTHER ASCITES: ICD-10-CM

## 2018-01-01 DIAGNOSIS — R06.89 DYSPNEA AND RESPIRATORY ABNORMALITIES: Primary | ICD-10-CM

## 2018-01-01 DIAGNOSIS — R19.00 ABDOMINAL MASS, UNSPECIFIED ABDOMINAL LOCATION: ICD-10-CM

## 2018-01-01 DIAGNOSIS — C56.9 MALIGNANT NEOPLASM OF OVARY, UNSPECIFIED LATERALITY (HCC): Primary | ICD-10-CM

## 2018-01-01 LAB
-: ABNORMAL
-: NORMAL
ABO/RH: NORMAL
ABSOLUTE BANDS #: 0.14 K/UL (ref 0–1)
ABSOLUTE BANDS #: 0.16 K/UL (ref 0–1)
ABSOLUTE EOS #: 0 K/UL (ref 0–0.4)
ABSOLUTE EOS #: 0 K/UL (ref 0–0.44)
ABSOLUTE EOS #: 0.14 K/UL (ref 0–0.4)
ABSOLUTE EOS #: 0.14 K/UL (ref 0–0.44)
ABSOLUTE EOS #: 0.15 K/UL (ref 0–0.44)
ABSOLUTE EOS #: 0.18 K/UL (ref 0–0.4)
ABSOLUTE EOS #: 0.2 K/UL (ref 0–0.4)
ABSOLUTE EOS #: 0.21 K/UL (ref 0–0.4)
ABSOLUTE EOS #: 0.21 K/UL (ref 0–0.44)
ABSOLUTE EOS #: 0.22 K/UL (ref 0–0.44)
ABSOLUTE EOS #: 0.3 K/UL (ref 0–0.4)
ABSOLUTE EOS #: 0.3 K/UL (ref 0–0.44)
ABSOLUTE EOS #: 0.4 K/UL (ref 0–0.4)
ABSOLUTE EOS #: 0.47 K/UL (ref 0–0.4)
ABSOLUTE EOS #: 0.65 K/UL (ref 0–0.4)
ABSOLUTE EOS #: <0.03 K/UL (ref 0–0.44)
ABSOLUTE IMMATURE GRANULOCYTE: 0.21 K/UL (ref 0–0.3)
ABSOLUTE IMMATURE GRANULOCYTE: 0.26 K/UL (ref 0–0.3)
ABSOLUTE IMMATURE GRANULOCYTE: 0.27 K/UL (ref 0–0.3)
ABSOLUTE IMMATURE GRANULOCYTE: 0.29 K/UL (ref 0–0.3)
ABSOLUTE IMMATURE GRANULOCYTE: 0.33 K/UL (ref 0–0.3)
ABSOLUTE IMMATURE GRANULOCYTE: 0.34 K/UL (ref 0–0.3)
ABSOLUTE IMMATURE GRANULOCYTE: 0.53 K/UL (ref 0–0.3)
ABSOLUTE IMMATURE GRANULOCYTE: 1.02 K/UL (ref 0–0.3)
ABSOLUTE IMMATURE GRANULOCYTE: 1.08 K/UL (ref 0–0.3)
ABSOLUTE IMMATURE GRANULOCYTE: 1.17 K/UL (ref 0–0.3)
ABSOLUTE IMMATURE GRANULOCYTE: 1.18 K/UL (ref 0–0.3)
ABSOLUTE IMMATURE GRANULOCYTE: 1.46 K/UL (ref 0–0.3)
ABSOLUTE IMMATURE GRANULOCYTE: 1.55 K/UL (ref 0–0.3)
ABSOLUTE IMMATURE GRANULOCYTE: 1.71 K/UL (ref 0–0.3)
ABSOLUTE IMMATURE GRANULOCYTE: ABNORMAL K/UL (ref 0–0.3)
ABSOLUTE LYMPH #: 0.89 K/UL (ref 1.1–3.7)
ABSOLUTE LYMPH #: 0.9 K/UL (ref 1–4.8)
ABSOLUTE LYMPH #: 0.92 K/UL (ref 1.1–3.7)
ABSOLUTE LYMPH #: 0.95 K/UL (ref 1–4.8)
ABSOLUTE LYMPH #: 0.97 K/UL (ref 1–4.8)
ABSOLUTE LYMPH #: 1.05 K/UL (ref 1.1–3.7)
ABSOLUTE LYMPH #: 1.07 K/UL (ref 1–4.8)
ABSOLUTE LYMPH #: 1.3 K/UL (ref 1–4.8)
ABSOLUTE LYMPH #: 1.36 K/UL (ref 1.1–3.7)
ABSOLUTE LYMPH #: 1.42 K/UL (ref 1–4.8)
ABSOLUTE LYMPH #: 1.44 K/UL (ref 1.1–3.7)
ABSOLUTE LYMPH #: 1.46 K/UL (ref 1–4.8)
ABSOLUTE LYMPH #: 1.52 K/UL (ref 1.1–3.7)
ABSOLUTE LYMPH #: 1.56 K/UL (ref 1.1–3.7)
ABSOLUTE LYMPH #: 1.6 K/UL (ref 1–4.8)
ABSOLUTE LYMPH #: 1.94 K/UL (ref 1–4.8)
ABSOLUTE LYMPH #: 2.04 K/UL (ref 1–4.8)
ABSOLUTE LYMPH #: 2.04 K/UL (ref 1–4.8)
ABSOLUTE LYMPH #: 2.56 K/UL (ref 1–4.8)
ABSOLUTE LYMPH #: 2.78 K/UL (ref 1–4.8)
ABSOLUTE MONO #: 0.77 K/UL (ref 0.1–1.2)
ABSOLUTE MONO #: 0.82 K/UL (ref 0.1–0.8)
ABSOLUTE MONO #: 0.89 K/UL (ref 0.1–1.2)
ABSOLUTE MONO #: 0.93 K/UL (ref 0.1–0.8)
ABSOLUTE MONO #: 0.97 K/UL (ref 0.1–1.3)
ABSOLUTE MONO #: 1 K/UL (ref 0.1–1.3)
ABSOLUTE MONO #: 1.07 K/UL (ref 0.1–0.8)
ABSOLUTE MONO #: 1.1 K/UL (ref 0.1–1.3)
ABSOLUTE MONO #: 1.17 K/UL (ref 0.1–1.2)
ABSOLUTE MONO #: 1.2 K/UL (ref 0.1–1.3)
ABSOLUTE MONO #: 1.23 K/UL (ref 0.1–1.2)
ABSOLUTE MONO #: 1.27 K/UL (ref 0.1–1.2)
ABSOLUTE MONO #: 1.3 K/UL (ref 0.1–1.2)
ABSOLUTE MONO #: 1.37 K/UL (ref 0.1–1.2)
ABSOLUTE MONO #: 1.43 K/UL (ref 0.1–1.3)
ABSOLUTE MONO #: 1.51 K/UL (ref 0.1–0.8)
ABSOLUTE MONO #: 2.12 K/UL (ref 0.1–0.8)
ABSOLUTE MONO #: 2.14 K/UL (ref 0.1–0.8)
ABSOLUTE MONO #: 2.65 K/UL (ref 0.2–0.8)
ABSOLUTE MONO #: 2.75 K/UL (ref 0.1–0.8)
ABSOLUTE RETIC #: 0.13 M/UL (ref 0.03–0.08)
ABSOLUTE RETIC #: 0.14 M/UL (ref 0.03–0.08)
ACTION: NORMAL
ALBUMIN FLUID: 2.2 G/DL
ALBUMIN FLUID: 2.5 G/DL
ALBUMIN SERPL-MCNC: 1.8 G/DL (ref 3.5–5.2)
ALBUMIN SERPL-MCNC: 1.9 G/DL (ref 3.5–5.2)
ALBUMIN SERPL-MCNC: 2.2 G/DL (ref 3.5–5.2)
ALBUMIN SERPL-MCNC: 2.4 G/DL (ref 3.5–5.2)
ALBUMIN SERPL-MCNC: 2.5 G/DL (ref 3.5–5.2)
ALBUMIN SERPL-MCNC: 2.8 G/DL (ref 3.5–5.2)
ALBUMIN SERPL-MCNC: 4 G/DL (ref 3.5–5.2)
ALBUMIN/GLOBULIN RATIO: 0.5 (ref 1–2.5)
ALBUMIN/GLOBULIN RATIO: 0.7 (ref 1–2.5)
ALBUMIN/GLOBULIN RATIO: 0.8 (ref 1–2.5)
ALBUMIN/GLOBULIN RATIO: ABNORMAL (ref 1–2.5)
ALBUMIN/GLOBULIN RATIO: ABNORMAL (ref 1–2.5)
ALP BLD-CCNC: 39 U/L (ref 35–104)
ALP BLD-CCNC: 44 U/L (ref 35–104)
ALP BLD-CCNC: 49 U/L (ref 35–104)
ALP BLD-CCNC: 62 U/L (ref 35–104)
ALP BLD-CCNC: 91 U/L (ref 35–104)
ALT SERPL-CCNC: 5 U/L (ref 5–33)
ALT SERPL-CCNC: 6 U/L (ref 5–33)
ALT SERPL-CCNC: <5 U/L (ref 5–33)
AMORPHOUS: ABNORMAL
AMORPHOUS: NORMAL
AMYLASE FLUID: 20 U/L
ANION GAP SERPL CALCULATED.3IONS-SCNC: 10 MMOL/L (ref 9–17)
ANION GAP SERPL CALCULATED.3IONS-SCNC: 10 MMOL/L (ref 9–17)
ANION GAP SERPL CALCULATED.3IONS-SCNC: 11 MMOL/L (ref 9–17)
ANION GAP SERPL CALCULATED.3IONS-SCNC: 11 MMOL/L (ref 9–17)
ANION GAP SERPL CALCULATED.3IONS-SCNC: 12 MMOL/L (ref 9–17)
ANION GAP SERPL CALCULATED.3IONS-SCNC: 13 MMOL/L (ref 9–17)
ANION GAP SERPL CALCULATED.3IONS-SCNC: 14 MMOL/L (ref 9–17)
ANION GAP SERPL CALCULATED.3IONS-SCNC: 15 MMOL/L (ref 9–17)
ANION GAP SERPL CALCULATED.3IONS-SCNC: 15 MMOL/L (ref 9–17)
ANION GAP SERPL CALCULATED.3IONS-SCNC: 16 MMOL/L (ref 9–17)
ANION GAP SERPL CALCULATED.3IONS-SCNC: 17 MMOL/L (ref 9–17)
ANION GAP SERPL CALCULATED.3IONS-SCNC: 18 MMOL/L (ref 9–17)
ANION GAP SERPL CALCULATED.3IONS-SCNC: 18 MMOL/L (ref 9–17)
ANION GAP SERPL CALCULATED.3IONS-SCNC: 19 MMOL/L (ref 9–17)
ANION GAP SERPL CALCULATED.3IONS-SCNC: 20 MMOL/L (ref 9–17)
ANION GAP SERPL CALCULATED.3IONS-SCNC: 20 MMOL/L (ref 9–17)
ANION GAP SERPL CALCULATED.3IONS-SCNC: 23 MMOL/L (ref 9–17)
ANION GAP SERPL CALCULATED.3IONS-SCNC: 9 MMOL/L (ref 9–17)
ANTIBODY SCREEN: NEGATIVE
APPEARANCE FLUID: ABNORMAL
APPEARANCE FLUID: NORMAL
ARM BAND NUMBER: NORMAL
AST SERPL-CCNC: 25 U/L
AST SERPL-CCNC: 25 U/L
AST SERPL-CCNC: 30 U/L
AST SERPL-CCNC: 30 U/L
AST SERPL-CCNC: 39 U/L
BACTERIA: ABNORMAL
BACTERIA: NORMAL
BANDS: 1 % (ref 0–10)
BANDS: 1 % (ref 0–10)
BASO FLUID: ABNORMAL %
BASO FLUID: NORMAL %
BASOPHILS # BLD: 0 %
BASOPHILS # BLD: 0 % (ref 0–2)
BASOPHILS # BLD: 1 % (ref 0–2)
BASOPHILS ABSOLUTE: 0 K/UL (ref 0–0.2)
BASOPHILS ABSOLUTE: 0.03 K/UL (ref 0–0.2)
BASOPHILS ABSOLUTE: 0.04 K/UL (ref 0–0.2)
BASOPHILS ABSOLUTE: 0.05 K/UL (ref 0–0.2)
BASOPHILS ABSOLUTE: 0.05 K/UL (ref 0–0.2)
BASOPHILS ABSOLUTE: 0.08 K/UL (ref 0–0.2)
BASOPHILS ABSOLUTE: 0.1 K/UL (ref 0–0.2)
BASOPHILS ABSOLUTE: 0.1 K/UL (ref 0–0.2)
BASOPHILS ABSOLUTE: <0.03 K/UL (ref 0–0.2)
BILIRUB SERPL-MCNC: 0.25 MG/DL (ref 0.3–1.2)
BILIRUB SERPL-MCNC: 0.28 MG/DL (ref 0.3–1.2)
BILIRUB SERPL-MCNC: 0.36 MG/DL (ref 0.3–1.2)
BILIRUB SERPL-MCNC: 0.39 MG/DL (ref 0.3–1.2)
BILIRUB SERPL-MCNC: <0.1 MG/DL (ref 0.3–1.2)
BILIRUBIN DIRECT: 0.09 MG/DL
BILIRUBIN DIRECT: 0.1 MG/DL
BILIRUBIN URINE: ABNORMAL
BILIRUBIN URINE: NEGATIVE
BILIRUBIN, INDIRECT: 0.16 MG/DL (ref 0–1)
BILIRUBIN, INDIRECT: 0.18 MG/DL (ref 0–1)
BLD PROD TYP BPU: NORMAL
BLOOD BANK SPECIMEN: NORMAL
BUN BLDV-MCNC: 29 MG/DL (ref 8–23)
BUN BLDV-MCNC: 31 MG/DL (ref 8–23)
BUN BLDV-MCNC: 32 MG/DL (ref 8–23)
BUN BLDV-MCNC: 32 MG/DL (ref 8–23)
BUN BLDV-MCNC: 34 MG/DL (ref 8–23)
BUN BLDV-MCNC: 38 MG/DL (ref 8–23)
BUN BLDV-MCNC: 41 MG/DL (ref 8–23)
BUN BLDV-MCNC: 41 MG/DL (ref 8–23)
BUN BLDV-MCNC: 42 MG/DL (ref 8–23)
BUN BLDV-MCNC: 42 MG/DL (ref 8–23)
BUN BLDV-MCNC: 43 MG/DL (ref 8–23)
BUN BLDV-MCNC: 44 MG/DL (ref 8–23)
BUN BLDV-MCNC: 46 MG/DL (ref 8–23)
BUN BLDV-MCNC: 49 MG/DL (ref 8–23)
BUN BLDV-MCNC: 49 MG/DL (ref 8–23)
BUN BLDV-MCNC: 50 MG/DL (ref 8–23)
BUN BLDV-MCNC: 50 MG/DL (ref 8–23)
BUN BLDV-MCNC: 51 MG/DL (ref 8–23)
BUN BLDV-MCNC: 52 MG/DL (ref 8–23)
BUN BLDV-MCNC: 52 MG/DL (ref 8–23)
BUN BLDV-MCNC: 53 MG/DL (ref 8–23)
BUN BLDV-MCNC: 53 MG/DL (ref 8–23)
BUN BLDV-MCNC: 55 MG/DL (ref 8–23)
BUN BLDV-MCNC: 56 MG/DL (ref 8–23)
BUN BLDV-MCNC: 59 MG/DL (ref 8–23)
BUN BLDV-MCNC: 63 MG/DL (ref 8–23)
BUN BLDV-MCNC: 65 MG/DL (ref 8–23)
BUN BLDV-MCNC: 66 MG/DL (ref 8–23)
BUN BLDV-MCNC: 69 MG/DL (ref 8–23)
BUN BLDV-MCNC: 70 MG/DL (ref 8–23)
BUN BLDV-MCNC: 75 MG/DL (ref 8–23)
BUN BLDV-MCNC: 76 MG/DL (ref 8–23)
BUN BLDV-MCNC: 77 MG/DL (ref 8–23)
BUN BLDV-MCNC: 77 MG/DL (ref 8–23)
BUN/CREAT BLD: 18 (ref 9–20)
BUN/CREAT BLD: ABNORMAL (ref 9–20)
C DIFFICILE TOXINS, PCR: ABNORMAL
C DIFFICILE TOXINS, PCR: ABNORMAL
C DIFFICILE TOXINS, PCR: NORMAL
CA 125: 151 U/ML
CA 125: 254 U/ML
CALCIUM IONIZED: 1.08 MMOL/L (ref 1.13–1.33)
CALCIUM IONIZED: 1.08 MMOL/L (ref 1.13–1.33)
CALCIUM SERPL-MCNC: 7.3 MG/DL (ref 8.6–10.4)
CALCIUM SERPL-MCNC: 7.5 MG/DL (ref 8.6–10.4)
CALCIUM SERPL-MCNC: 7.6 MG/DL (ref 8.6–10.4)
CALCIUM SERPL-MCNC: 7.7 MG/DL (ref 8.6–10.4)
CALCIUM SERPL-MCNC: 7.8 MG/DL (ref 8.6–10.4)
CALCIUM SERPL-MCNC: 7.9 MG/DL (ref 8.6–10.4)
CALCIUM SERPL-MCNC: 8 MG/DL (ref 8.6–10.4)
CALCIUM SERPL-MCNC: 8 MG/DL (ref 8.6–10.4)
CALCIUM SERPL-MCNC: 8.1 MG/DL (ref 8.6–10.4)
CALCIUM SERPL-MCNC: 8.2 MG/DL (ref 8.6–10.4)
CALCIUM SERPL-MCNC: 8.3 MG/DL (ref 8.6–10.4)
CALCIUM SERPL-MCNC: 8.3 MG/DL (ref 8.6–10.4)
CALCIUM SERPL-MCNC: 8.4 MG/DL (ref 8.6–10.4)
CALCIUM SERPL-MCNC: 8.4 MG/DL (ref 8.6–10.4)
CALCIUM SERPL-MCNC: 8.5 MG/DL (ref 8.6–10.4)
CALCIUM SERPL-MCNC: 8.9 MG/DL (ref 8.6–10.4)
CALCIUM SERPL-MCNC: 9 MG/DL (ref 8.6–10.4)
CASTS UA: ABNORMAL /LPF
CASTS UA: ABNORMAL /LPF
CASTS UA: ABNORMAL /LPF (ref 0–8)
CASTS UA: NORMAL /LPF (ref 0–2)
CHLORIDE BLD-SCNC: 100 MMOL/L (ref 98–107)
CHLORIDE BLD-SCNC: 101 MMOL/L (ref 98–107)
CHLORIDE BLD-SCNC: 102 MMOL/L (ref 98–107)
CHLORIDE BLD-SCNC: 103 MMOL/L (ref 98–107)
CHLORIDE BLD-SCNC: 103 MMOL/L (ref 98–107)
CHLORIDE BLD-SCNC: 105 MMOL/L (ref 98–107)
CHLORIDE BLD-SCNC: 90 MMOL/L (ref 98–107)
CHLORIDE BLD-SCNC: 90 MMOL/L (ref 98–107)
CHLORIDE BLD-SCNC: 91 MMOL/L (ref 98–107)
CHLORIDE BLD-SCNC: 93 MMOL/L (ref 98–107)
CHLORIDE BLD-SCNC: 93 MMOL/L (ref 98–107)
CHLORIDE BLD-SCNC: 94 MMOL/L (ref 98–107)
CHLORIDE BLD-SCNC: 95 MMOL/L (ref 98–107)
CHLORIDE BLD-SCNC: 96 MMOL/L (ref 98–107)
CHLORIDE BLD-SCNC: 97 MMOL/L (ref 98–107)
CHLORIDE BLD-SCNC: 98 MMOL/L (ref 98–107)
CO2: 14 MMOL/L (ref 20–31)
CO2: 16 MMOL/L (ref 20–31)
CO2: 16 MMOL/L (ref 20–31)
CO2: 17 MMOL/L (ref 20–31)
CO2: 17 MMOL/L (ref 20–31)
CO2: 18 MMOL/L (ref 20–31)
CO2: 19 MMOL/L (ref 20–31)
CO2: 20 MMOL/L (ref 20–31)
CO2: 21 MMOL/L (ref 20–31)
CO2: 22 MMOL/L (ref 20–31)
CO2: 24 MMOL/L (ref 20–31)
CO2: 25 MMOL/L (ref 20–31)
COLOR FLUID: ABNORMAL
COLOR FLUID: NORMAL
COLOR: ABNORMAL
COLOR: ABNORMAL
COLOR: YELLOW
COLOR: YELLOW
COMMENT UA: ABNORMAL
CORTISOL COLLECTION INFO: NORMAL
CORTISOL: 14.6 UG/DL (ref 2.7–18.4)
CREAT SERPL-MCNC: 1.08 MG/DL (ref 0.5–0.9)
CREAT SERPL-MCNC: 1.17 MG/DL (ref 0.5–0.9)
CREAT SERPL-MCNC: 1.19 MG/DL (ref 0.5–0.9)
CREAT SERPL-MCNC: 1.19 MG/DL (ref 0.5–0.9)
CREAT SERPL-MCNC: 1.2 MG/DL (ref 0.5–0.9)
CREAT SERPL-MCNC: 1.54 MG/DL (ref 0.5–0.9)
CREAT SERPL-MCNC: 1.59 MG/DL (ref 0.5–0.9)
CREAT SERPL-MCNC: 1.66 MG/DL (ref 0.5–0.9)
CREAT SERPL-MCNC: 1.7 MG/DL (ref 0.5–0.9)
CREAT SERPL-MCNC: 1.7 MG/DL (ref 0.5–0.9)
CREAT SERPL-MCNC: 1.75 MG/DL (ref 0.5–0.9)
CREAT SERPL-MCNC: 1.78 MG/DL (ref 0.5–0.9)
CREAT SERPL-MCNC: 1.89 MG/DL (ref 0.5–0.9)
CREAT SERPL-MCNC: 1.89 MG/DL (ref 0.5–0.9)
CREAT SERPL-MCNC: 1.93 MG/DL (ref 0.5–0.9)
CREAT SERPL-MCNC: 1.95 MG/DL (ref 0.5–0.9)
CREAT SERPL-MCNC: 2.04 MG/DL (ref 0.5–0.9)
CREAT SERPL-MCNC: 2.05 MG/DL (ref 0.5–0.9)
CREAT SERPL-MCNC: 2.2 MG/DL (ref 0.5–0.9)
CREAT SERPL-MCNC: 2.33 MG/DL (ref 0.5–0.9)
CREAT SERPL-MCNC: 2.35 MG/DL (ref 0.5–0.9)
CREAT SERPL-MCNC: 2.5 MG/DL (ref 0.5–0.9)
CREAT SERPL-MCNC: 2.53 MG/DL (ref 0.5–0.9)
CREAT SERPL-MCNC: 2.72 MG/DL (ref 0.5–0.9)
CREAT SERPL-MCNC: 2.89 MG/DL (ref 0.5–0.9)
CREAT SERPL-MCNC: 2.89 MG/DL (ref 0.5–0.9)
CREAT SERPL-MCNC: 3.23 MG/DL (ref 0.5–0.9)
CREAT SERPL-MCNC: 3.27 MG/DL (ref 0.5–0.9)
CREAT SERPL-MCNC: 3.31 MG/DL (ref 0.5–0.9)
CREAT SERPL-MCNC: 3.35 MG/DL (ref 0.5–0.9)
CREAT SERPL-MCNC: 3.35 MG/DL (ref 0.5–0.9)
CREAT SERPL-MCNC: 3.63 MG/DL (ref 0.5–0.9)
CREAT SERPL-MCNC: 3.78 MG/DL (ref 0.5–0.9)
CREAT SERPL-MCNC: 3.98 MG/DL (ref 0.5–0.9)
CREATININE URINE: 321 MG/DL (ref 28–217)
CROSSMATCH RESULT: NORMAL
CRYSTALS, UA: ABNORMAL /HPF
CRYSTALS, UA: NORMAL /HPF
CULTURE: ABNORMAL
CULTURE: NORMAL
D-DIMER QUANTITATIVE: 12.43 MG/L FEU
DATE AND TIME: NORMAL
DIFFERENTIAL TYPE: ABNORMAL
DIRECT EXAM: ABNORMAL
DISPENSE STATUS BLOOD BANK: NORMAL
EKG ATRIAL RATE: 106 BPM
EKG ATRIAL RATE: 107 BPM
EKG ATRIAL RATE: 111 BPM
EKG ATRIAL RATE: 113 BPM
EKG ATRIAL RATE: 118 BPM
EKG P AXIS: 36 DEGREES
EKG P AXIS: 48 DEGREES
EKG P AXIS: 56 DEGREES
EKG P AXIS: 58 DEGREES
EKG P AXIS: 62 DEGREES
EKG P-R INTERVAL: 132 MS
EKG P-R INTERVAL: 138 MS
EKG P-R INTERVAL: 142 MS
EKG P-R INTERVAL: 144 MS
EKG P-R INTERVAL: 146 MS
EKG Q-T INTERVAL: 326 MS
EKG Q-T INTERVAL: 328 MS
EKG Q-T INTERVAL: 328 MS
EKG Q-T INTERVAL: 336 MS
EKG Q-T INTERVAL: 342 MS
EKG QRS DURATION: 78 MS
EKG QRS DURATION: 78 MS
EKG QRS DURATION: 80 MS
EKG QRS DURATION: 80 MS
EKG QRS DURATION: 82 MS
EKG QTC CALCULATION (BAZETT): 446 MS
EKG QTC CALCULATION (BAZETT): 449 MS
EKG QTC CALCULATION (BAZETT): 456 MS
EKG QTC CALCULATION (BAZETT): 465 MS
EKG QTC CALCULATION (BAZETT): 488 MS
EKG R AXIS: -10 DEGREES
EKG R AXIS: -11 DEGREES
EKG R AXIS: -12 DEGREES
EKG R AXIS: -2 DEGREES
EKG R AXIS: 4 DEGREES
EKG T AXIS: 38 DEGREES
EKG T AXIS: 53 DEGREES
EKG T AXIS: 60 DEGREES
EKG T AXIS: 64 DEGREES
EKG T AXIS: 67 DEGREES
EKG VENTRICULAR RATE: 106 BPM
EKG VENTRICULAR RATE: 111 BPM
EKG VENTRICULAR RATE: 113 BPM
EKG VENTRICULAR RATE: 118 BPM
EKG VENTRICULAR RATE: 133 BPM
EOSINOPHIL FLUID: ABNORMAL %
EOSINOPHIL FLUID: NORMAL %
EOSINOPHILS RELATIVE PERCENT: 0 % (ref 0–4)
EOSINOPHILS RELATIVE PERCENT: 0 % (ref 1–4)
EOSINOPHILS RELATIVE PERCENT: 1 % (ref 0–4)
EOSINOPHILS RELATIVE PERCENT: 1 % (ref 0–4)
EOSINOPHILS RELATIVE PERCENT: 1 % (ref 1–4)
EOSINOPHILS RELATIVE PERCENT: 2 % (ref 1–4)
EOSINOPHILS RELATIVE PERCENT: 3 % (ref 0–4)
EOSINOPHILS RELATIVE PERCENT: 3 % (ref 1–4)
EOSINOPHILS RELATIVE PERCENT: 4 % (ref 0–4)
EPITHELIAL CELLS UA: ABNORMAL /HPF
EPITHELIAL CELLS UA: ABNORMAL /HPF
EPITHELIAL CELLS UA: ABNORMAL /HPF (ref 0–5)
EPITHELIAL CELLS UA: NORMAL /HPF (ref 0–5)
ESTIMATED AVERAGE GLUCOSE: 91 MG/DL
EXPIRATION DATE: NORMAL
FDP: <5 UG/ML
FERRITIN: 1327 UG/L (ref 13–150)
FIBRINOGEN: 697 MG/DL (ref 140–420)
FLUID DIFF COMMENT: ABNORMAL
FLUID DIFF COMMENT: NORMAL
FOLATE: 4.6 NG/ML
GFR AFRICAN AMERICAN: 14 ML/MIN
GFR AFRICAN AMERICAN: 15 ML/MIN
GFR AFRICAN AMERICAN: 15 ML/MIN
GFR AFRICAN AMERICAN: 17 ML/MIN
GFR AFRICAN AMERICAN: 20 ML/MIN
GFR AFRICAN AMERICAN: 20 ML/MIN
GFR AFRICAN AMERICAN: 21 ML/MIN
GFR AFRICAN AMERICAN: 23 ML/MIN
GFR AFRICAN AMERICAN: 24 ML/MIN
GFR AFRICAN AMERICAN: 25 ML/MIN
GFR AFRICAN AMERICAN: 25 ML/MIN
GFR AFRICAN AMERICAN: 27 ML/MIN
GFR AFRICAN AMERICAN: 30 ML/MIN
GFR AFRICAN AMERICAN: 30 ML/MIN
GFR AFRICAN AMERICAN: 31 ML/MIN
GFR AFRICAN AMERICAN: 32 ML/MIN
GFR AFRICAN AMERICAN: 35 ML/MIN
GFR AFRICAN AMERICAN: 35 ML/MIN
GFR AFRICAN AMERICAN: 37 ML/MIN
GFR AFRICAN AMERICAN: 37 ML/MIN
GFR AFRICAN AMERICAN: 38 ML/MIN
GFR AFRICAN AMERICAN: 40 ML/MIN
GFR AFRICAN AMERICAN: 41 ML/MIN
GFR AFRICAN AMERICAN: 55 ML/MIN
GFR AFRICAN AMERICAN: 56 ML/MIN
GFR AFRICAN AMERICAN: >60 ML/MIN
GFR NON-AFRICAN AMERICAN: 11 ML/MIN
GFR NON-AFRICAN AMERICAN: 12 ML/MIN
GFR NON-AFRICAN AMERICAN: 13 ML/MIN
GFR NON-AFRICAN AMERICAN: 14 ML/MIN
GFR NON-AFRICAN AMERICAN: 16 ML/MIN
GFR NON-AFRICAN AMERICAN: 16 ML/MIN
GFR NON-AFRICAN AMERICAN: 18 ML/MIN
GFR NON-AFRICAN AMERICAN: 19 ML/MIN
GFR NON-AFRICAN AMERICAN: 19 ML/MIN
GFR NON-AFRICAN AMERICAN: 21 ML/MIN
GFR NON-AFRICAN AMERICAN: 21 ML/MIN
GFR NON-AFRICAN AMERICAN: 22 ML/MIN
GFR NON-AFRICAN AMERICAN: 24 ML/MIN
GFR NON-AFRICAN AMERICAN: 25 ML/MIN
GFR NON-AFRICAN AMERICAN: 26 ML/MIN
GFR NON-AFRICAN AMERICAN: 26 ML/MIN
GFR NON-AFRICAN AMERICAN: 27 ML/MIN
GFR NON-AFRICAN AMERICAN: 27 ML/MIN
GFR NON-AFRICAN AMERICAN: 29 ML/MIN
GFR NON-AFRICAN AMERICAN: 29 ML/MIN
GFR NON-AFRICAN AMERICAN: 30 ML/MIN
GFR NON-AFRICAN AMERICAN: 30 ML/MIN
GFR NON-AFRICAN AMERICAN: 31 ML/MIN
GFR NON-AFRICAN AMERICAN: 33 ML/MIN
GFR NON-AFRICAN AMERICAN: 34 ML/MIN
GFR NON-AFRICAN AMERICAN: 45 ML/MIN
GFR NON-AFRICAN AMERICAN: 46 ML/MIN
GFR NON-AFRICAN AMERICAN: 46 ML/MIN
GFR NON-AFRICAN AMERICAN: 47 ML/MIN
GFR NON-AFRICAN AMERICAN: 51 ML/MIN
GFR SERPL CREATININE-BSD FRML MDRD: ABNORMAL ML/MIN/{1.73_M2}
GLOBULIN: ABNORMAL G/DL (ref 1.5–3.8)
GLOBULIN: ABNORMAL G/DL (ref 1.5–3.8)
GLUCOSE BLD-MCNC: 101 MG/DL (ref 65–105)
GLUCOSE BLD-MCNC: 101 MG/DL (ref 65–105)
GLUCOSE BLD-MCNC: 103 MG/DL (ref 65–105)
GLUCOSE BLD-MCNC: 103 MG/DL (ref 65–105)
GLUCOSE BLD-MCNC: 103 MG/DL (ref 70–99)
GLUCOSE BLD-MCNC: 103 MG/DL (ref 70–99)
GLUCOSE BLD-MCNC: 104 MG/DL (ref 65–105)
GLUCOSE BLD-MCNC: 104 MG/DL (ref 70–99)
GLUCOSE BLD-MCNC: 106 MG/DL (ref 70–99)
GLUCOSE BLD-MCNC: 109 MG/DL (ref 65–105)
GLUCOSE BLD-MCNC: 110 MG/DL (ref 70–99)
GLUCOSE BLD-MCNC: 110 MG/DL (ref 70–99)
GLUCOSE BLD-MCNC: 112 MG/DL (ref 65–105)
GLUCOSE BLD-MCNC: 112 MG/DL (ref 70–99)
GLUCOSE BLD-MCNC: 112 MG/DL (ref 70–99)
GLUCOSE BLD-MCNC: 113 MG/DL (ref 65–105)
GLUCOSE BLD-MCNC: 113 MG/DL (ref 70–99)
GLUCOSE BLD-MCNC: 115 MG/DL (ref 70–99)
GLUCOSE BLD-MCNC: 117 MG/DL (ref 65–105)
GLUCOSE BLD-MCNC: 118 MG/DL (ref 65–105)
GLUCOSE BLD-MCNC: 119 MG/DL (ref 70–99)
GLUCOSE BLD-MCNC: 119 MG/DL (ref 70–99)
GLUCOSE BLD-MCNC: 121 MG/DL (ref 65–105)
GLUCOSE BLD-MCNC: 121 MG/DL (ref 70–99)
GLUCOSE BLD-MCNC: 124 MG/DL (ref 70–99)
GLUCOSE BLD-MCNC: 127 MG/DL (ref 70–99)
GLUCOSE BLD-MCNC: 128 MG/DL (ref 65–105)
GLUCOSE BLD-MCNC: 134 MG/DL (ref 65–105)
GLUCOSE BLD-MCNC: 135 MG/DL (ref 70–99)
GLUCOSE BLD-MCNC: 137 MG/DL (ref 65–105)
GLUCOSE BLD-MCNC: 139 MG/DL (ref 65–105)
GLUCOSE BLD-MCNC: 157 MG/DL (ref 70–99)
GLUCOSE BLD-MCNC: 161 MG/DL (ref 65–105)
GLUCOSE BLD-MCNC: 81 MG/DL (ref 65–105)
GLUCOSE BLD-MCNC: 81 MG/DL (ref 65–105)
GLUCOSE BLD-MCNC: 82 MG/DL (ref 65–105)
GLUCOSE BLD-MCNC: 84 MG/DL (ref 65–105)
GLUCOSE BLD-MCNC: 90 MG/DL (ref 70–99)
GLUCOSE BLD-MCNC: 91 MG/DL (ref 70–99)
GLUCOSE BLD-MCNC: 92 MG/DL (ref 70–99)
GLUCOSE BLD-MCNC: 93 MG/DL (ref 70–99)
GLUCOSE BLD-MCNC: 94 MG/DL (ref 65–105)
GLUCOSE BLD-MCNC: 94 MG/DL (ref 70–99)
GLUCOSE BLD-MCNC: 96 MG/DL (ref 70–99)
GLUCOSE BLD-MCNC: 96 MG/DL (ref 70–99)
GLUCOSE BLD-MCNC: 97 MG/DL (ref 70–99)
GLUCOSE BLD-MCNC: 99 MG/DL (ref 70–99)
GLUCOSE URINE: NEGATIVE
GLUCOSE, FLUID: 94 MG/DL
HAPTOGLOBIN: 362 MG/DL (ref 30–200)
HBA1C MFR BLD: 4.8 % (ref 4–6)
HBV SURFACE AB TITR SER: <3.5 MIU/ML
HBV SURFACE AB TITR SER: <3.5 MIU/ML
HCT VFR BLD CALC: 21.4 % (ref 36.3–47.1)
HCT VFR BLD CALC: 25.5 % (ref 36.3–47.1)
HCT VFR BLD CALC: 25.7 % (ref 36.3–47.1)
HCT VFR BLD CALC: 26.1 % (ref 36.3–47.1)
HCT VFR BLD CALC: 26.6 % (ref 36.3–47.1)
HCT VFR BLD CALC: 26.8 % (ref 36.3–47.1)
HCT VFR BLD CALC: 27.7 % (ref 36.3–47.1)
HCT VFR BLD CALC: 27.9 % (ref 36.3–47.1)
HCT VFR BLD CALC: 28.1 % (ref 36.3–47.1)
HCT VFR BLD CALC: 30.2 % (ref 36–46)
HCT VFR BLD CALC: 31.5 % (ref 36–46)
HCT VFR BLD CALC: 32.1 % (ref 36–46)
HCT VFR BLD CALC: 32.9 % (ref 36.3–47.1)
HCT VFR BLD CALC: 34.1 % (ref 36.3–47.1)
HCT VFR BLD CALC: 34.2 % (ref 36–46)
HCT VFR BLD CALC: 34.4 % (ref 36.3–47.1)
HCT VFR BLD CALC: 34.5 % (ref 36.3–47.1)
HCT VFR BLD CALC: 34.5 % (ref 36.3–47.1)
HCT VFR BLD CALC: 34.8 % (ref 36–46)
HCT VFR BLD CALC: 35.7 % (ref 36.3–47.1)
HCT VFR BLD CALC: 35.8 % (ref 36–46)
HCT VFR BLD CALC: 36.9 % (ref 36.3–47.1)
HCT VFR BLD CALC: 37.6 % (ref 36.3–47.1)
HCT VFR BLD CALC: 37.9 % (ref 36.3–47.1)
HCT VFR BLD CALC: 39.5 % (ref 36.3–47.1)
HCT VFR BLD CALC: 39.8 % (ref 36–46)
HCT VFR BLD CALC: 39.8 % (ref 36–46)
HCT VFR BLD CALC: 40.1 % (ref 36.3–47.1)
HCT VFR BLD CALC: 40.1 % (ref 36–46)
HEMOGLOBIN: 10.3 G/DL (ref 11.9–15.1)
HEMOGLOBIN: 10.4 G/DL (ref 11.9–15.1)
HEMOGLOBIN: 10.4 G/DL (ref 12–16)
HEMOGLOBIN: 10.7 G/DL (ref 11.9–15.1)
HEMOGLOBIN: 11 G/DL (ref 11.9–15.1)
HEMOGLOBIN: 11.2 G/DL (ref 12–16)
HEMOGLOBIN: 11.3 G/DL (ref 11.9–15.1)
HEMOGLOBIN: 11.3 G/DL (ref 12–16)
HEMOGLOBIN: 11.4 G/DL (ref 11.9–15.1)
HEMOGLOBIN: 11.4 G/DL (ref 11.9–15.1)
HEMOGLOBIN: 11.7 G/DL (ref 11.9–15.1)
HEMOGLOBIN: 11.8 G/DL (ref 12–16)
HEMOGLOBIN: 11.9 G/DL (ref 11.9–15.1)
HEMOGLOBIN: 12.1 G/DL (ref 11.9–15.1)
HEMOGLOBIN: 12.1 G/DL (ref 12–16)
HEMOGLOBIN: 12.6 G/DL (ref 11.9–15.1)
HEMOGLOBIN: 13.2 G/DL (ref 12–16)
HEMOGLOBIN: 13.6 G/DL (ref 12–16)
HEMOGLOBIN: 13.6 G/DL (ref 12–16)
HEMOGLOBIN: 6.8 G/DL (ref 11.9–15.1)
HEMOGLOBIN: 8 G/DL (ref 11.9–15.1)
HEMOGLOBIN: 8.3 G/DL (ref 11.9–15.1)
HEMOGLOBIN: 8.6 G/DL (ref 11.9–15.1)
HEMOGLOBIN: 8.7 G/DL (ref 11.9–15.1)
HEMOGLOBIN: 8.8 G/DL (ref 11.9–15.1)
HEMOGLOBIN: 9.2 G/DL (ref 11.9–15.1)
HEMOGLOBIN: 9.8 G/DL (ref 12–16)
HEPARIN INDUCED PLATELET ANTIBODY: 0.79 O.D. (ref 0–0.4)
HEPATITIS B CORE TOTAL ANTIBODY: NONREACTIVE
HEPATITIS B CORE TOTAL ANTIBODY: NONREACTIVE
HEPATITIS B SURFACE ANTIGEN: NONREACTIVE
HEPATITIS B SURFACE ANTIGEN: NONREACTIVE
HEPATITIS C ANTIBODY: NONREACTIVE
IMMATURE GRANULOCYTES: 1 %
IMMATURE GRANULOCYTES: 2 %
IMMATURE GRANULOCYTES: 5 %
IMMATURE GRANULOCYTES: 7 %
IMMATURE GRANULOCYTES: 8 %
IMMATURE GRANULOCYTES: 8 %
IMMATURE GRANULOCYTES: ABNORMAL %
IMMATURE RETIC FRACT: 31.1 % (ref 2.7–18.3)
IMMATURE RETIC FRACT: 39 % (ref 2.7–18.3)
INR BLD: 0.9
INR BLD: 1
INR BLD: 1.2
IRON SATURATION: 33 % (ref 20–55)
IRON: 46 UG/DL (ref 37–145)
KETONES, URINE: NEGATIVE
LACTATE DEHYDROGENASE, FLUID: 1035 U/L
LACTATE DEHYDROGENASE, FLUID: 2580 U/L
LACTATE DEHYDROGENASE: 1100 U/L (ref 135–214)
LACTIC ACID, WHOLE BLOOD: 1.9 MMOL/L (ref 0.7–2.1)
LACTIC ACID, WHOLE BLOOD: 3.1 MMOL/L (ref 0.7–2.1)
LACTIC ACID, WHOLE BLOOD: NORMAL MMOL/L (ref 0.7–2.1)
LACTIC ACID: 1.4 MMOL/L (ref 0.5–2.2)
LACTIC ACID: NORMAL MMOL/L
LEUKOCYTE ESTERASE, URINE: ABNORMAL
LEUKOCYTE ESTERASE, URINE: NEGATIVE
LIPASE: 22 U/L (ref 13–60)
LIPASE: 34 U/L (ref 13–60)
LV EF: 55 %
LVEF MODALITY: NORMAL
LYMPHOCYTES # BLD: 10 % (ref 24–43)
LYMPHOCYTES # BLD: 10 % (ref 24–44)
LYMPHOCYTES # BLD: 11 % (ref 24–43)
LYMPHOCYTES # BLD: 11 % (ref 24–43)
LYMPHOCYTES # BLD: 11 % (ref 24–44)
LYMPHOCYTES # BLD: 13 % (ref 24–44)
LYMPHOCYTES # BLD: 15 % (ref 24–44)
LYMPHOCYTES # BLD: 4 % (ref 24–43)
LYMPHOCYTES # BLD: 4 % (ref 24–44)
LYMPHOCYTES # BLD: 6 % (ref 24–43)
LYMPHOCYTES # BLD: 6 % (ref 24–44)
LYMPHOCYTES # BLD: 6 % (ref 24–44)
LYMPHOCYTES # BLD: 7 % (ref 24–43)
LYMPHOCYTES # BLD: 7 % (ref 24–44)
LYMPHOCYTES # BLD: 8 % (ref 24–44)
LYMPHOCYTES # BLD: 8 % (ref 24–44)
LYMPHOCYTES # BLD: 9 % (ref 24–43)
LYMPHOCYTES # BLD: 9 % (ref 24–44)
LYMPHOCYTES, BODY FLUID: 11 %
LYMPHOCYTES, BODY FLUID: 2 %
Lab: ABNORMAL
Lab: NORMAL
MAGNESIUM: 2.6 MG/DL (ref 1.6–2.6)
MCH RBC QN AUTO: 28.6 PG (ref 25.2–33.5)
MCH RBC QN AUTO: 28.7 PG (ref 25.2–33.5)
MCH RBC QN AUTO: 28.8 PG (ref 25.2–33.5)
MCH RBC QN AUTO: 28.8 PG (ref 25.2–33.5)
MCH RBC QN AUTO: 29 PG (ref 25.2–33.5)
MCH RBC QN AUTO: 29.1 PG (ref 25.2–33.5)
MCH RBC QN AUTO: 29.1 PG (ref 26–34)
MCH RBC QN AUTO: 29.2 PG (ref 25.2–33.5)
MCH RBC QN AUTO: 29.2 PG (ref 26–34)
MCH RBC QN AUTO: 29.3 PG (ref 25.2–33.5)
MCH RBC QN AUTO: 29.3 PG (ref 25.2–33.5)
MCH RBC QN AUTO: 29.4 PG (ref 25.2–33.5)
MCH RBC QN AUTO: 29.5 PG (ref 25.2–33.5)
MCH RBC QN AUTO: 29.5 PG (ref 25.2–33.5)
MCH RBC QN AUTO: 29.5 PG (ref 26–34)
MCH RBC QN AUTO: 29.6 PG (ref 25.2–33.5)
MCH RBC QN AUTO: 29.6 PG (ref 26–34)
MCH RBC QN AUTO: 29.6 PG (ref 26–34)
MCH RBC QN AUTO: 29.7 PG (ref 25.2–33.5)
MCH RBC QN AUTO: 29.8 PG (ref 26–34)
MCH RBC QN AUTO: 29.9 PG (ref 26–34)
MCH RBC QN AUTO: 30 PG (ref 26–34)
MCH RBC QN AUTO: 30.1 PG (ref 25.2–33.5)
MCH RBC QN AUTO: 30.5 PG (ref 26–34)
MCHC RBC AUTO-ENTMCNC: 29.7 G/DL (ref 28.4–34.8)
MCHC RBC AUTO-ENTMCNC: 30.3 G/DL (ref 28.4–34.8)
MCHC RBC AUTO-ENTMCNC: 30.5 G/DL (ref 28.4–34.8)
MCHC RBC AUTO-ENTMCNC: 30.9 G/DL (ref 28.4–34.8)
MCHC RBC AUTO-ENTMCNC: 31 G/DL (ref 28.4–34.8)
MCHC RBC AUTO-ENTMCNC: 31.1 G/DL (ref 28.4–34.8)
MCHC RBC AUTO-ENTMCNC: 31.3 G/DL (ref 28.4–34.8)
MCHC RBC AUTO-ENTMCNC: 31.4 G/DL (ref 28.4–34.8)
MCHC RBC AUTO-ENTMCNC: 31.5 G/DL (ref 28.4–34.8)
MCHC RBC AUTO-ENTMCNC: 31.8 G/DL (ref 28.4–34.8)
MCHC RBC AUTO-ENTMCNC: 31.9 G/DL (ref 28.4–34.8)
MCHC RBC AUTO-ENTMCNC: 32.3 G/DL (ref 28.4–34.8)
MCHC RBC AUTO-ENTMCNC: 32.5 G/DL (ref 31–37)
MCHC RBC AUTO-ENTMCNC: 32.7 G/DL (ref 28.4–34.8)
MCHC RBC AUTO-ENTMCNC: 32.8 G/DL (ref 28.4–34.8)
MCHC RBC AUTO-ENTMCNC: 32.8 G/DL (ref 28.4–34.8)
MCHC RBC AUTO-ENTMCNC: 33 G/DL (ref 31–37)
MCHC RBC AUTO-ENTMCNC: 33 G/DL (ref 31–37)
MCHC RBC AUTO-ENTMCNC: 33.2 G/DL (ref 28.4–34.8)
MCHC RBC AUTO-ENTMCNC: 33.2 G/DL (ref 31–37)
MCHC RBC AUTO-ENTMCNC: 33.3 G/DL (ref 28.4–34.8)
MCHC RBC AUTO-ENTMCNC: 33.7 G/DL (ref 31–37)
MCHC RBC AUTO-ENTMCNC: 33.8 G/DL (ref 31–37)
MCHC RBC AUTO-ENTMCNC: 33.9 G/DL (ref 31–37)
MCHC RBC AUTO-ENTMCNC: 34.1 G/DL (ref 31–37)
MCHC RBC AUTO-ENTMCNC: 34.8 G/DL (ref 31–37)
MCV RBC AUTO: 86.8 FL (ref 80–100)
MCV RBC AUTO: 87.5 FL (ref 80–100)
MCV RBC AUTO: 87.5 FL (ref 80–100)
MCV RBC AUTO: 87.6 FL (ref 80–100)
MCV RBC AUTO: 88 FL (ref 80–100)
MCV RBC AUTO: 88.1 FL (ref 80–100)
MCV RBC AUTO: 88.5 FL (ref 82.6–102.9)
MCV RBC AUTO: 89 FL (ref 82.6–102.9)
MCV RBC AUTO: 89.4 FL (ref 80–100)
MCV RBC AUTO: 89.4 FL (ref 82.6–102.9)
MCV RBC AUTO: 89.8 FL (ref 82.6–102.9)
MCV RBC AUTO: 90.5 FL (ref 80–100)
MCV RBC AUTO: 90.5 FL (ref 82.6–102.9)
MCV RBC AUTO: 90.8 FL (ref 82.6–102.9)
MCV RBC AUTO: 91 FL (ref 80–100)
MCV RBC AUTO: 91.4 FL (ref 82.6–102.9)
MCV RBC AUTO: 92.1 FL (ref 82.6–102.9)
MCV RBC AUTO: 92.2 FL (ref 82.6–102.9)
MCV RBC AUTO: 92.7 FL (ref 82.6–102.9)
MCV RBC AUTO: 93.2 FL (ref 82.6–102.9)
MCV RBC AUTO: 93.4 FL (ref 82.6–102.9)
MCV RBC AUTO: 94.2 FL (ref 82.6–102.9)
MCV RBC AUTO: 94.5 FL (ref 82.6–102.9)
MCV RBC AUTO: 94.5 FL (ref 82.6–102.9)
MCV RBC AUTO: 95.3 FL (ref 82.6–102.9)
MCV RBC AUTO: 99.8 FL (ref 82.6–102.9)
METAMYELOCYTES ABSOLUTE COUNT: 1.02 K/UL
METAMYELOCYTES: 5 %
MONOCYTE, FLUID: 72 %
MONOCYTE, FLUID: NORMAL %
MONOCYTES # BLD: 10 % (ref 1–7)
MONOCYTES # BLD: 10 % (ref 3–12)
MONOCYTES # BLD: 13 % (ref 1–7)
MONOCYTES # BLD: 15 % (ref 1–7)
MONOCYTES # BLD: 4 % (ref 1–7)
MONOCYTES # BLD: 4 % (ref 1–7)
MONOCYTES # BLD: 4 % (ref 3–12)
MONOCYTES # BLD: 5 % (ref 1–7)
MONOCYTES # BLD: 5 % (ref 3–12)
MONOCYTES # BLD: 7 % (ref 1–7)
MONOCYTES # BLD: 7 % (ref 1–7)
MONOCYTES # BLD: 8 % (ref 1–7)
MONOCYTES # BLD: 8 % (ref 1–7)
MONOCYTES # BLD: 8 % (ref 3–12)
MONOCYTES # BLD: 8 % (ref 3–12)
MONOCYTES # BLD: 9 % (ref 1–7)
MONOCYTES # BLD: 9 % (ref 3–12)
MONOCYTES # BLD: 9 % (ref 3–12)
MORPHOLOGY: ABNORMAL
MORPHOLOGY: NORMAL
MUCUS: ABNORMAL
MUCUS: NORMAL
MYOGLOBIN: 58 NG/ML (ref 25–58)
NEUTROPHIL, FLUID: 16 %
NEUTROPHIL, FLUID: 17 %
NITRITE, URINE: NEGATIVE
NITRITE, URINE: NEGATIVE
NITRITE, URINE: POSITIVE
NITRITE, URINE: POSITIVE
NOTIFY: NORMAL
NRBC AUTOMATED: 0 PER 100 WBC
NRBC AUTOMATED: 0.1 PER 100 WBC
NRBC AUTOMATED: 0.3 PER 100 WBC
NRBC AUTOMATED: 0.7 PER 100 WBC
NRBC AUTOMATED: ABNORMAL PER 100 WBC
NUCLEATED RED BLOOD CELLS: 2 PER 100 WBC
ORGANISM: ABNORMAL
ORGANISM: ABNORMAL
OTHER CELLS FLUID: ABNORMAL %
OTHER CELLS FLUID: NORMAL %
OTHER OBSERVATIONS UA: ABNORMAL
OTHER OBSERVATIONS UA: NORMAL
PARTIAL THROMBOPLASTIN TIME: 27.1 SEC (ref 20.5–30.5)
PARTIAL THROMBOPLASTIN TIME: 27.5 SEC (ref 23–31)
PARTIAL THROMBOPLASTIN TIME: 28.4 SEC (ref 20.5–30.5)
PARTIAL THROMBOPLASTIN TIME: 84.5 SEC (ref 20.5–30.5)
PARTIAL THROMBOPLASTIN TIME: >120 SEC (ref 20.5–30.5)
PATHOLOGIST REVIEW: NORMAL
PDW BLD-RTO: 13.5 % (ref 11.5–14.9)
PDW BLD-RTO: 13.9 % (ref 11.5–14.9)
PDW BLD-RTO: 13.9 % (ref 11.5–14.9)
PDW BLD-RTO: 14 % (ref 11.5–14.9)
PDW BLD-RTO: 14 % (ref 11.5–14.9)
PDW BLD-RTO: 14.1 % (ref 11.5–14.9)
PDW BLD-RTO: 14.1 % (ref 11.8–14.4)
PDW BLD-RTO: 14.2 % (ref 11.5–14.9)
PDW BLD-RTO: 14.2 % (ref 11.5–14.9)
PDW BLD-RTO: 14.3 % (ref 11.8–14.4)
PDW BLD-RTO: 14.5 % (ref 11.8–14.4)
PDW BLD-RTO: 14.5 % (ref 11.8–14.4)
PDW BLD-RTO: 14.6 % (ref 11.8–14.4)
PDW BLD-RTO: 14.7 % (ref 11.8–14.4)
PDW BLD-RTO: 14.8 % (ref 11.8–14.4)
PDW BLD-RTO: 14.8 % (ref 11.8–14.4)
PDW BLD-RTO: 14.9 % (ref 11.8–14.4)
PDW BLD-RTO: 15 % (ref 11.8–14.4)
PDW BLD-RTO: 15.3 % (ref 11.8–14.4)
PDW BLD-RTO: 16.2 % (ref 11.5–14.5)
PDW BLD-RTO: 16.6 % (ref 11.8–14.4)
PDW BLD-RTO: 16.7 % (ref 11.8–14.4)
PH UA: 5 (ref 5–8)
PH UA: 5 (ref 5–8)
PH UA: 5.5 (ref 5–8)
PH UA: 6 (ref 5–8)
PLATELET # BLD: 139 K/UL (ref 138–453)
PLATELET # BLD: 183 K/UL (ref 130–400)
PLATELET # BLD: 195 K/UL (ref 150–450)
PLATELET # BLD: 201 K/UL (ref 138–453)
PLATELET # BLD: 232 K/UL (ref 150–450)
PLATELET # BLD: 235 K/UL (ref 138–453)
PLATELET # BLD: 256 K/UL (ref 138–453)
PLATELET # BLD: 261 K/UL (ref 138–453)
PLATELET # BLD: 264 K/UL (ref 138–453)
PLATELET # BLD: 264 K/UL (ref 150–450)
PLATELET # BLD: 270 K/UL (ref 138–453)
PLATELET # BLD: 284 K/UL (ref 150–450)
PLATELET # BLD: 289 K/UL (ref 138–453)
PLATELET # BLD: 302 K/UL (ref 138–453)
PLATELET # BLD: 311 K/UL (ref 138–453)
PLATELET # BLD: 322 K/UL (ref 138–453)
PLATELET # BLD: 332 K/UL (ref 138–453)
PLATELET # BLD: 339 K/UL (ref 138–453)
PLATELET # BLD: 346 K/UL (ref 150–450)
PLATELET # BLD: 348 K/UL (ref 150–450)
PLATELET # BLD: 361 K/UL (ref 138–453)
PLATELET # BLD: 376 K/UL (ref 150–450)
PLATELET # BLD: 377 K/UL (ref 138–453)
PLATELET # BLD: 378 K/UL (ref 150–450)
PLATELET # BLD: 383 K/UL (ref 138–453)
PLATELET # BLD: 407 K/UL (ref 138–453)
PLATELET # BLD: ABNORMAL K/UL (ref 138–453)
PLATELET # BLD: ABNORMAL K/UL (ref 138–453)
PLATELET ESTIMATE: ABNORMAL
PLATELET, FLUORESCENCE: 97 K/UL (ref 138–453)
PLATELET, FLUORESCENCE: NORMAL K/UL (ref 138–453)
PLATELET, IMMATURE FRACTION: 3.9 % (ref 1.1–10.3)
PLATELET, IMMATURE FRACTION: NORMAL % (ref 1.1–10.3)
PMV BLD AUTO: 10 FL (ref 8.1–13.5)
PMV BLD AUTO: 7.2 FL (ref 6–12)
PMV BLD AUTO: 7.4 FL (ref 6–12)
PMV BLD AUTO: 7.6 FL (ref 6–12)
PMV BLD AUTO: 7.7 FL (ref 6–12)
PMV BLD AUTO: 7.8 FL (ref 6–12)
PMV BLD AUTO: 7.9 FL (ref 6–12)
PMV BLD AUTO: 7.9 FL (ref 6–12)
PMV BLD AUTO: 8.7 FL (ref 8.1–13.5)
PMV BLD AUTO: 9.1 FL (ref 8.1–13.5)
PMV BLD AUTO: 9.2 FL (ref 8.1–13.5)
PMV BLD AUTO: 9.4 FL (ref 8.1–13.5)
PMV BLD AUTO: 9.5 FL (ref 8.1–13.5)
PMV BLD AUTO: 9.5 FL (ref 8.1–13.5)
PMV BLD AUTO: 9.6 FL (ref 8.1–13.5)
PMV BLD AUTO: 9.7 FL (ref 8.1–13.5)
PMV BLD AUTO: 9.8 FL (ref 8.1–13.5)
PMV BLD AUTO: 9.9 FL (ref 8.1–13.5)
PMV BLD AUTO: ABNORMAL FL (ref 8.1–13.5)
PMV BLD AUTO: ABNORMAL FL (ref 8.1–13.5)
POC POTASSIUM: 5.2 MMOL/L (ref 3.5–4.5)
POC POTASSIUM: 6.4 MMOL/L (ref 3.5–4.5)
POTASSIUM SERPL-SCNC: 4.2 MMOL/L (ref 3.7–5.3)
POTASSIUM SERPL-SCNC: 4.4 MMOL/L (ref 3.7–5.3)
POTASSIUM SERPL-SCNC: 4.4 MMOL/L (ref 3.7–5.3)
POTASSIUM SERPL-SCNC: 4.5 MMOL/L (ref 3.7–5.3)
POTASSIUM SERPL-SCNC: 4.6 MMOL/L (ref 3.7–5.3)
POTASSIUM SERPL-SCNC: 4.7 MMOL/L (ref 3.7–5.3)
POTASSIUM SERPL-SCNC: 4.8 MMOL/L (ref 3.7–5.3)
POTASSIUM SERPL-SCNC: 4.9 MMOL/L (ref 3.7–5.3)
POTASSIUM SERPL-SCNC: 5 MMOL/L (ref 3.7–5.3)
POTASSIUM SERPL-SCNC: 5.1 MMOL/L (ref 3.7–5.3)
POTASSIUM SERPL-SCNC: 5.2 MMOL/L (ref 3.7–5.3)
POTASSIUM SERPL-SCNC: 5.2 MMOL/L (ref 3.7–5.3)
POTASSIUM SERPL-SCNC: 5.3 MMOL/L (ref 3.7–5.3)
POTASSIUM SERPL-SCNC: 5.4 MMOL/L (ref 3.7–5.3)
POTASSIUM SERPL-SCNC: 5.5 MMOL/L (ref 3.7–5.3)
POTASSIUM SERPL-SCNC: 5.5 MMOL/L (ref 3.7–5.3)
POTASSIUM SERPL-SCNC: 5.6 MMOL/L (ref 3.7–5.3)
POTASSIUM SERPL-SCNC: 5.6 MMOL/L (ref 3.7–5.3)
POTASSIUM SERPL-SCNC: 5.7 MMOL/L (ref 3.7–5.3)
POTASSIUM SERPL-SCNC: 5.7 MMOL/L (ref 3.7–5.3)
POTASSIUM SERPL-SCNC: 5.9 MMOL/L (ref 3.7–5.3)
POTASSIUM SERPL-SCNC: 6 MMOL/L (ref 3.7–5.3)
POTASSIUM SERPL-SCNC: 6.4 MMOL/L (ref 3.7–5.3)
POTASSIUM SERPL-SCNC: 6.5 MMOL/L (ref 3.7–5.3)
PREALBUMIN: 6 MG/DL (ref 20–40)
PROTEIN UA: ABNORMAL
PROTHROMBIN TIME: 10.6 SEC (ref 9.7–12)
PROTHROMBIN TIME: 12.2 SEC (ref 9–12)
PROTHROMBIN TIME: 9.7 SEC (ref 9–12)
PTH INTACT: 182.9 PG/ML (ref 15–65)
RBC # BLD: 2.29 M/UL (ref 3.95–5.11)
RBC # BLD: 2.75 M/UL (ref 3.95–5.11)
RBC # BLD: 2.79 M/UL (ref 3.95–5.11)
RBC # BLD: 2.93 M/UL (ref 3.95–5.11)
RBC # BLD: 2.95 M/UL (ref 3.95–5.11)
RBC # BLD: 2.95 M/UL (ref 3.95–5.11)
RBC # BLD: 3.01 M/UL (ref 3.95–5.11)
RBC # BLD: 3.06 M/UL (ref 3.95–5.11)
RBC # BLD: 3.32 M/UL (ref 4–5.2)
RBC # BLD: 3.52 M/UL (ref 4–5.2)
RBC # BLD: 3.55 M/UL (ref 3.95–5.11)
RBC # BLD: 3.61 M/UL (ref 3.95–5.11)
RBC # BLD: 3.65 M/UL (ref 3.95–5.11)
RBC # BLD: 3.68 M/UL (ref 4–5.2)
RBC # BLD: 3.74 M/UL (ref 3.95–5.11)
RBC # BLD: 3.78 M/UL (ref 4–5.2)
RBC # BLD: 3.86 M/UL (ref 3.95–5.11)
RBC # BLD: 3.96 M/UL (ref 3.95–5.11)
RBC # BLD: 3.97 M/UL (ref 4–5.2)
RBC # BLD: 3.98 M/UL (ref 3.95–5.11)
RBC # BLD: 4.01 M/UL (ref 3.95–5.11)
RBC # BLD: 4.02 M/UL (ref 3.95–5.11)
RBC # BLD: 4.13 M/UL (ref 4–5.2)
RBC # BLD: 4.22 M/UL (ref 3.95–5.11)
RBC # BLD: 4.32 M/UL (ref 3.95–5.11)
RBC # BLD: 4.53 M/UL (ref 4–5.2)
RBC # BLD: 4.54 M/UL (ref 4–5.2)
RBC # BLD: 4.56 M/UL (ref 4–5.2)
RBC # BLD: ABNORMAL 10*6/UL
RBC FLUID: ABNORMAL /MM3
RBC FLUID: NORMAL /MM3
RBC UA: ABNORMAL /HPF
RBC UA: ABNORMAL /HPF
RBC UA: ABNORMAL /HPF (ref 0–4)
RBC UA: NORMAL /HPF (ref 0–2)
READ BACK: YES
RENAL EPITHELIAL, UA: ABNORMAL /HPF
RENAL EPITHELIAL, UA: NORMAL /HPF
RETIC %: 4.4 % (ref 0.5–1.9)
RETIC %: 4.8 % (ref 0.5–1.9)
RETIC HEMOGLOBIN: 33.1 PG (ref 28.2–35.7)
RETIC HEMOGLOBIN: 34.4 PG (ref 28.2–35.7)
SEG NEUTROPHILS: 68 % (ref 36–66)
SEG NEUTROPHILS: 70 % (ref 36–66)
SEG NEUTROPHILS: 72 % (ref 36–66)
SEG NEUTROPHILS: 73 % (ref 36–66)
SEG NEUTROPHILS: 76 % (ref 36–65)
SEG NEUTROPHILS: 76 % (ref 36–66)
SEG NEUTROPHILS: 77 % (ref 36–65)
SEG NEUTROPHILS: 78 % (ref 36–65)
SEG NEUTROPHILS: 78 % (ref 36–66)
SEG NEUTROPHILS: 79 % (ref 36–66)
SEG NEUTROPHILS: 80 % (ref 36–65)
SEG NEUTROPHILS: 80 % (ref 36–66)
SEG NEUTROPHILS: 81 % (ref 36–66)
SEG NEUTROPHILS: 81 % (ref 36–66)
SEG NEUTROPHILS: 82 % (ref 36–65)
SEG NEUTROPHILS: 84 % (ref 36–66)
SEG NEUTROPHILS: 84 % (ref 36–66)
SEG NEUTROPHILS: 85 % (ref 36–65)
SEG NEUTROPHILS: 86 % (ref 36–66)
SEG NEUTROPHILS: 88 % (ref 36–65)
SEGMENTED NEUTROPHILS ABSOLUTE COUNT: 10.41 K/UL (ref 1.5–8.1)
SEGMENTED NEUTROPHILS ABSOLUTE COUNT: 10.8 K/UL (ref 1.3–9.1)
SEGMENTED NEUTROPHILS ABSOLUTE COUNT: 10.94 K/UL (ref 1.5–8.1)
SEGMENTED NEUTROPHILS ABSOLUTE COUNT: 11.58 K/UL (ref 1.3–9.1)
SEGMENTED NEUTROPHILS ABSOLUTE COUNT: 11.83 K/UL (ref 1.5–8.1)
SEGMENTED NEUTROPHILS ABSOLUTE COUNT: 11.85 K/UL (ref 1.5–8.1)
SEGMENTED NEUTROPHILS ABSOLUTE COUNT: 12.45 K/UL (ref 1.8–7.7)
SEGMENTED NEUTROPHILS ABSOLUTE COUNT: 12.95 K/UL (ref 1.5–8.1)
SEGMENTED NEUTROPHILS ABSOLUTE COUNT: 13.36 K/UL (ref 1.3–9.1)
SEGMENTED NEUTROPHILS ABSOLUTE COUNT: 14.15 K/UL (ref 1.5–8.1)
SEGMENTED NEUTROPHILS ABSOLUTE COUNT: 14.69 K/UL (ref 1.8–7.7)
SEGMENTED NEUTROPHILS ABSOLUTE COUNT: 15.63 K/UL (ref 1.8–7.7)
SEGMENTED NEUTROPHILS ABSOLUTE COUNT: 16.42 K/UL (ref 1.8–7.7)
SEGMENTED NEUTROPHILS ABSOLUTE COUNT: 16.52 K/UL (ref 1.8–7.7)
SEGMENTED NEUTROPHILS ABSOLUTE COUNT: 18.17 K/UL (ref 1.8–7.7)
SEGMENTED NEUTROPHILS ABSOLUTE COUNT: 18.87 K/UL (ref 1.5–8.1)
SEGMENTED NEUTROPHILS ABSOLUTE COUNT: 18.88 K/UL (ref 1.8–7.7)
SEGMENTED NEUTROPHILS ABSOLUTE COUNT: 22.96 K/UL (ref 1.8–7.7)
SEGMENTED NEUTROPHILS ABSOLUTE COUNT: 7.9 K/UL (ref 1.3–9.1)
SEGMENTED NEUTROPHILS ABSOLUTE COUNT: 9.5 K/UL (ref 1.3–9.1)
SODIUM BLD-SCNC: 129 MMOL/L (ref 135–144)
SODIUM BLD-SCNC: 130 MMOL/L (ref 135–144)
SODIUM BLD-SCNC: 131 MMOL/L (ref 135–144)
SODIUM BLD-SCNC: 132 MMOL/L (ref 135–144)
SODIUM BLD-SCNC: 133 MMOL/L (ref 135–144)
SODIUM BLD-SCNC: 134 MMOL/L (ref 135–144)
SODIUM BLD-SCNC: 135 MMOL/L (ref 135–144)
SODIUM BLD-SCNC: 135 MMOL/L (ref 135–144)
SODIUM BLD-SCNC: 136 MMOL/L (ref 135–144)
SODIUM BLD-SCNC: 136 MMOL/L (ref 135–144)
SODIUM BLD-SCNC: 137 MMOL/L (ref 135–144)
SODIUM BLD-SCNC: 138 MMOL/L (ref 135–144)
SODIUM,UR: 20 MMOL/L
SPECIFIC GRAVITY UA: 1.02 (ref 1–1.03)
SPECIFIC GRAVITY UA: 1.03 (ref 1–1.03)
SPECIFIC GRAVITY UA: 1.03 (ref 1–1.03)
SPECIFIC GRAVITY UA: 1.07 (ref 1–1.03)
SPECIMEN DESCRIPTION: ABNORMAL
SPECIMEN DESCRIPTION: NORMAL
SPECIMEN TYPE: ABNORMAL
SPECIMEN TYPE: NORMAL
STATUS: ABNORMAL
STATUS: NORMAL
SURGICAL PATHOLOGY REPORT: NORMAL
SURGICAL PATHOLOGY REPORT: NORMAL
THYROXINE, FREE: 1.17 NG/DL (ref 0.93–1.7)
TOTAL IRON BINDING CAPACITY: 139 UG/DL (ref 250–450)
TOTAL PROTEIN, BODY FLUID: 4.2 G/DL
TOTAL PROTEIN, URINE: 46 MG/DL
TOTAL PROTEIN: 5 G/DL (ref 6.4–8.3)
TOTAL PROTEIN: 5.1 G/DL (ref 6.4–8.3)
TOTAL PROTEIN: 5.9 G/DL (ref 6.4–8.3)
TOTAL PROTEIN: 6 G/DL (ref 6.4–8.3)
TOTAL PROTEIN: 6.9 G/DL (ref 6.4–8.3)
TRANSFUSION STATUS: NORMAL
TRICHOMONAS: ABNORMAL
TRICHOMONAS: NORMAL
TROPONIN INTERP: NORMAL
TROPONIN T: <0.03 NG/ML
TSH SERPL DL<=0.05 MIU/L-ACNC: 15.76 MIU/L (ref 0.3–5)
TURBIDITY: ABNORMAL
TURBIDITY: CLEAR
UNIT DIVISION: 0
UNIT NUMBER: NORMAL
UNSATURATED IRON BINDING CAPACITY: 93 UG/DL (ref 112–347)
UREA NITROGEN, UR: 691 MG/DL
URIC ACID: 13.5 MG/DL (ref 2.4–5.7)
URINE HGB: ABNORMAL
UROBILINOGEN, URINE: NORMAL
VITAMIN B-12: 601 PG/ML (ref 232–1245)
VITAMIN D 25-HYDROXY: 10.5 NG/ML (ref 30–100)
WBC # BLD: 11.1 K/UL (ref 3.5–11)
WBC # BLD: 11.8 K/UL (ref 3.5–11)
WBC # BLD: 12.2 K/UL (ref 3.5–11)
WBC # BLD: 12.6 K/UL (ref 3.5–11)
WBC # BLD: 13.5 K/UL (ref 3.5–11.3)
WBC # BLD: 13.6 K/UL (ref 3.5–11)
WBC # BLD: 13.8 K/UL (ref 3.5–11)
WBC # BLD: 14.4 K/UL (ref 3.5–11.3)
WBC # BLD: 15.1 K/UL (ref 3.5–11.3)
WBC # BLD: 15.1 K/UL (ref 3.5–11.3)
WBC # BLD: 15.9 K/UL (ref 3.5–11)
WBC # BLD: 15.9 K/UL (ref 3.5–11.3)
WBC # BLD: 16.1 K/UL (ref 3.5–11.3)
WBC # BLD: 16.4 K/UL (ref 3.5–11.3)
WBC # BLD: 16.6 K/UL (ref 3.5–11.3)
WBC # BLD: 17.4 K/UL (ref 3.5–11.3)
WBC # BLD: 18.3 K/UL (ref 3.5–11.3)
WBC # BLD: 18.8 K/UL (ref 3.5–11.3)
WBC # BLD: 20.4 K/UL (ref 3.5–11)
WBC # BLD: 20.4 K/UL (ref 3.5–11.3)
WBC # BLD: 21.4 K/UL (ref 3.5–11.3)
WBC # BLD: 21.6 K/UL (ref 3.5–11.3)
WBC # BLD: 21.7 K/UL (ref 3.5–11.3)
WBC # BLD: 22.2 K/UL (ref 3.5–11.3)
WBC # BLD: 23.3 K/UL (ref 3.5–11.3)
WBC # BLD: 23.6 K/UL (ref 3.5–11.3)
WBC # BLD: 26.7 K/UL (ref 3.5–11.3)
WBC # BLD: 9.2 K/UL (ref 3.5–11)
WBC # BLD: ABNORMAL 10*3/UL
WBC FLUID: 4319 /MM3
WBC FLUID: 526 /MM3
WBC UA: ABNORMAL /HPF
WBC UA: ABNORMAL /HPF
WBC UA: ABNORMAL /HPF (ref 0–5)
WBC UA: NORMAL /HPF (ref 0–5)
YEAST: ABNORMAL
YEAST: NORMAL

## 2018-01-01 PROCEDURE — 49083 ABD PARACENTESIS W/IMAGING: CPT | Performed by: RADIOLOGY

## 2018-01-01 PROCEDURE — 2580000003 HC RX 258: Performed by: STUDENT IN AN ORGANIZED HEALTH CARE EDUCATION/TRAINING PROGRAM

## 2018-01-01 PROCEDURE — 6360000002 HC RX W HCPCS

## 2018-01-01 PROCEDURE — 6370000000 HC RX 637 (ALT 250 FOR IP): Performed by: STUDENT IN AN ORGANIZED HEALTH CARE EDUCATION/TRAINING PROGRAM

## 2018-01-01 PROCEDURE — 90937 HEMODIALYSIS REPEATED EVAL: CPT

## 2018-01-01 PROCEDURE — 87205 SMEAR GRAM STAIN: CPT

## 2018-01-01 PROCEDURE — 2580000003 HC RX 258: Performed by: INTERNAL MEDICINE

## 2018-01-01 PROCEDURE — 85027 COMPLETE CBC AUTOMATED: CPT

## 2018-01-01 PROCEDURE — 86022 PLATELET ANTIBODIES: CPT

## 2018-01-01 PROCEDURE — 36415 COLL VENOUS BLD VENIPUNCTURE: CPT

## 2018-01-01 PROCEDURE — P9047 ALBUMIN (HUMAN), 25%, 50ML: HCPCS | Performed by: RADIOLOGY

## 2018-01-01 PROCEDURE — 94762 N-INVAS EAR/PLS OXIMTRY CONT: CPT

## 2018-01-01 PROCEDURE — 82947 ASSAY GLUCOSE BLOOD QUANT: CPT

## 2018-01-01 PROCEDURE — 81001 URINALYSIS AUTO W/SCOPE: CPT

## 2018-01-01 PROCEDURE — 80048 BASIC METABOLIC PNL TOTAL CA: CPT

## 2018-01-01 PROCEDURE — 3700000000 HC ANESTHESIA ATTENDED CARE: Performed by: UROLOGY

## 2018-01-01 PROCEDURE — 6360000004 HC RX CONTRAST MEDICATION: Performed by: INTERNAL MEDICINE

## 2018-01-01 PROCEDURE — 99232 SBSQ HOSP IP/OBS MODERATE 35: CPT | Performed by: INTERNAL MEDICINE

## 2018-01-01 PROCEDURE — 90686 IIV4 VACC NO PRSV 0.5 ML IM: CPT | Performed by: INTERNAL MEDICINE

## 2018-01-01 PROCEDURE — 2060000000 HC ICU INTERMEDIATE R&B

## 2018-01-01 PROCEDURE — 6370000000 HC RX 637 (ALT 250 FOR IP): Performed by: EMERGENCY MEDICINE

## 2018-01-01 PROCEDURE — 82746 ASSAY OF FOLIC ACID SERUM: CPT

## 2018-01-01 PROCEDURE — 83605 ASSAY OF LACTIC ACID: CPT

## 2018-01-01 PROCEDURE — 6370000000 HC RX 637 (ALT 250 FOR IP): Performed by: INTERNAL MEDICINE

## 2018-01-01 PROCEDURE — 80053 COMPREHEN METABOLIC PANEL: CPT

## 2018-01-01 PROCEDURE — 82040 ASSAY OF SERUM ALBUMIN: CPT

## 2018-01-01 PROCEDURE — 85025 COMPLETE CBC W/AUTO DIFF WBC: CPT

## 2018-01-01 PROCEDURE — 2580000003 HC RX 258: Performed by: EMERGENCY MEDICINE

## 2018-01-01 PROCEDURE — 93005 ELECTROCARDIOGRAM TRACING: CPT

## 2018-01-01 PROCEDURE — 5A1D70Z PERFORMANCE OF URINARY FILTRATION, INTERMITTENT, LESS THAN 6 HOURS PER DAY: ICD-10-PCS | Performed by: INTERNAL MEDICINE

## 2018-01-01 PROCEDURE — 36430 TRANSFUSION BLD/BLD COMPNT: CPT

## 2018-01-01 PROCEDURE — 76770 US EXAM ABDO BACK WALL COMP: CPT

## 2018-01-01 PROCEDURE — 97530 THERAPEUTIC ACTIVITIES: CPT

## 2018-01-01 PROCEDURE — C1769 GUIDE WIRE: HCPCS

## 2018-01-01 PROCEDURE — 6360000002 HC RX W HCPCS: Performed by: INTERNAL MEDICINE

## 2018-01-01 PROCEDURE — 7100000000 HC PACU RECOVERY - FIRST 15 MIN: Performed by: UROLOGY

## 2018-01-01 PROCEDURE — 96372 THER/PROPH/DIAG INJ SC/IM: CPT

## 2018-01-01 PROCEDURE — 6360000002 HC RX W HCPCS: Performed by: STUDENT IN AN ORGANIZED HEALTH CARE EDUCATION/TRAINING PROGRAM

## 2018-01-01 PROCEDURE — 6360000002 HC RX W HCPCS: Performed by: FAMILY MEDICINE

## 2018-01-01 PROCEDURE — 84443 ASSAY THYROID STIM HORMONE: CPT

## 2018-01-01 PROCEDURE — 99233 SBSQ HOSP IP/OBS HIGH 50: CPT | Performed by: INTERNAL MEDICINE

## 2018-01-01 PROCEDURE — 83550 IRON BINDING TEST: CPT

## 2018-01-01 PROCEDURE — 83540 ASSAY OF IRON: CPT

## 2018-01-01 PROCEDURE — 86704 HEP B CORE ANTIBODY TOTAL: CPT

## 2018-01-01 PROCEDURE — 93970 EXTREMITY STUDY: CPT

## 2018-01-01 PROCEDURE — 97168 OT RE-EVAL EST PLAN CARE: CPT

## 2018-01-01 PROCEDURE — 3600000002 HC SURGERY LEVEL 2 BASE: Performed by: UROLOGY

## 2018-01-01 PROCEDURE — G8978 MOBILITY CURRENT STATUS: HCPCS

## 2018-01-01 PROCEDURE — 87040 BLOOD CULTURE FOR BACTERIA: CPT

## 2018-01-01 PROCEDURE — 2500000003 HC RX 250 WO HCPCS: Performed by: NURSE PRACTITIONER

## 2018-01-01 PROCEDURE — 7100000001 HC PACU RECOVERY - ADDTL 15 MIN: Performed by: UROLOGY

## 2018-01-01 PROCEDURE — 85362 FIBRIN DEGRADATION PRODUCTS: CPT

## 2018-01-01 PROCEDURE — 97535 SELF CARE MNGMENT TRAINING: CPT | Performed by: OCCUPATIONAL THERAPIST

## 2018-01-01 PROCEDURE — 76937 US GUIDE VASCULAR ACCESS: CPT

## 2018-01-01 PROCEDURE — 2500000003 HC RX 250 WO HCPCS: Performed by: FAMILY MEDICINE

## 2018-01-01 PROCEDURE — 85045 AUTOMATED RETICULOCYTE COUNT: CPT

## 2018-01-01 PROCEDURE — 85610 PROTHROMBIN TIME: CPT

## 2018-01-01 PROCEDURE — 99225 PR SBSQ OBSERVATION CARE/DAY 25 MINUTES: CPT | Performed by: INTERNAL MEDICINE

## 2018-01-01 PROCEDURE — 83690 ASSAY OF LIPASE: CPT

## 2018-01-01 PROCEDURE — 99253 IP/OBS CNSLTJ NEW/EST LOW 45: CPT | Performed by: OBSTETRICS & GYNECOLOGY

## 2018-01-01 PROCEDURE — 6360000002 HC RX W HCPCS: Performed by: RADIOLOGY

## 2018-01-01 PROCEDURE — 2500000003 HC RX 250 WO HCPCS: Performed by: NURSE ANESTHETIST, CERTIFIED REGISTERED

## 2018-01-01 PROCEDURE — 96374 THER/PROPH/DIAG INJ IV PUSH: CPT

## 2018-01-01 PROCEDURE — 6360000002 HC RX W HCPCS: Performed by: NURSE ANESTHETIST, CERTIFIED REGISTERED

## 2018-01-01 PROCEDURE — 71260 CT THORAX DX C+: CPT

## 2018-01-01 PROCEDURE — 86304 IMMUNOASSAY TUMOR CA 125: CPT

## 2018-01-01 PROCEDURE — 97116 GAIT TRAINING THERAPY: CPT

## 2018-01-01 PROCEDURE — P9046 ALBUMIN (HUMAN), 25%, 20 ML: HCPCS | Performed by: FAMILY MEDICINE

## 2018-01-01 PROCEDURE — 87493 C DIFF AMPLIFIED PROBE: CPT

## 2018-01-01 PROCEDURE — 89051 BODY FLUID CELL COUNT: CPT

## 2018-01-01 PROCEDURE — 71045 X-RAY EXAM CHEST 1 VIEW: CPT

## 2018-01-01 PROCEDURE — 6370000000 HC RX 637 (ALT 250 FOR IP): Performed by: HOSPITALIST

## 2018-01-01 PROCEDURE — 99213 OFFICE O/P EST LOW 20 MIN: CPT

## 2018-01-01 PROCEDURE — 97110 THERAPEUTIC EXERCISES: CPT

## 2018-01-01 PROCEDURE — G8988 SELF CARE GOAL STATUS: HCPCS

## 2018-01-01 PROCEDURE — 2580000003 HC RX 258: Performed by: HOSPITALIST

## 2018-01-01 PROCEDURE — 0T788DZ DILATION OF BILATERAL URETERS WITH INTRALUMINAL DEVICE, VIA NATURAL OR ARTIFICIAL OPENING ENDOSCOPIC: ICD-10-PCS | Performed by: UROLOGY

## 2018-01-01 PROCEDURE — 2580000003 HC RX 258: Performed by: RADIOLOGY

## 2018-01-01 PROCEDURE — 99215 OFFICE O/P EST HI 40 MIN: CPT | Performed by: INTERNAL MEDICINE

## 2018-01-01 PROCEDURE — 87186 SC STD MICRODIL/AGAR DIL: CPT

## 2018-01-01 PROCEDURE — C1894 INTRO/SHEATH, NON-LASER: HCPCS

## 2018-01-01 PROCEDURE — 74176 CT ABD & PELVIS W/O CONTRAST: CPT

## 2018-01-01 PROCEDURE — 86317 IMMUNOASSAY INFECTIOUS AGENT: CPT

## 2018-01-01 PROCEDURE — 84156 ASSAY OF PROTEIN URINE: CPT

## 2018-01-01 PROCEDURE — 83970 ASSAY OF PARATHORMONE: CPT

## 2018-01-01 PROCEDURE — G0378 HOSPITAL OBSERVATION PER HR: HCPCS

## 2018-01-01 PROCEDURE — 99239 HOSP IP/OBS DSCHRG MGMT >30: CPT | Performed by: INTERNAL MEDICINE

## 2018-01-01 PROCEDURE — 84132 ASSAY OF SERUM POTASSIUM: CPT

## 2018-01-01 PROCEDURE — 82042 OTHER SOURCE ALBUMIN QUAN EA: CPT

## 2018-01-01 PROCEDURE — 88104 CYTOPATH FL NONGYN SMEARS: CPT

## 2018-01-01 PROCEDURE — 36561 INSERT TUNNELED CV CATH: CPT | Performed by: RADIOLOGY

## 2018-01-01 PROCEDURE — P9046 ALBUMIN (HUMAN), 25%, 20 ML: HCPCS | Performed by: RADIOLOGY

## 2018-01-01 PROCEDURE — 3700000001 HC ADD 15 MINUTES (ANESTHESIA): Performed by: UROLOGY

## 2018-01-01 PROCEDURE — P9046 ALBUMIN (HUMAN), 25%, 20 ML: HCPCS | Performed by: INTERNAL MEDICINE

## 2018-01-01 PROCEDURE — 85730 THROMBOPLASTIN TIME PARTIAL: CPT

## 2018-01-01 PROCEDURE — 85379 FIBRIN DEGRADATION QUANT: CPT

## 2018-01-01 PROCEDURE — 6360000002 HC RX W HCPCS: Performed by: EMERGENCY MEDICINE

## 2018-01-01 PROCEDURE — 97166 OT EVAL MOD COMPLEX 45 MIN: CPT | Performed by: OCCUPATIONAL THERAPIST

## 2018-01-01 PROCEDURE — 83036 HEMOGLOBIN GLYCOSYLATED A1C: CPT

## 2018-01-01 PROCEDURE — 96375 TX/PRO/DX INJ NEW DRUG ADDON: CPT

## 2018-01-01 PROCEDURE — 97535 SELF CARE MNGMENT TRAINING: CPT

## 2018-01-01 PROCEDURE — 85018 HEMOGLOBIN: CPT

## 2018-01-01 PROCEDURE — 3700000001 HC ADD 15 MINUTES (ANESTHESIA)

## 2018-01-01 PROCEDURE — BT1D1ZZ FLUOROSCOPY OF RIGHT KIDNEY, URETER AND BLADDER USING LOW OSMOLAR CONTRAST: ICD-10-PCS | Performed by: UROLOGY

## 2018-01-01 PROCEDURE — 86850 RBC ANTIBODY SCREEN: CPT

## 2018-01-01 PROCEDURE — 76830 TRANSVAGINAL US NON-OB: CPT

## 2018-01-01 PROCEDURE — 02HV33Z INSERTION OF INFUSION DEVICE INTO SUPERIOR VENA CAVA, PERCUTANEOUS APPROACH: ICD-10-PCS | Performed by: RADIOLOGY

## 2018-01-01 PROCEDURE — 82306 VITAMIN D 25 HYDROXY: CPT

## 2018-01-01 PROCEDURE — 84134 ASSAY OF PREALBUMIN: CPT

## 2018-01-01 PROCEDURE — 0W9G3ZZ DRAINAGE OF PERITONEAL CAVITY, PERCUTANEOUS APPROACH: ICD-10-PCS | Performed by: RADIOLOGY

## 2018-01-01 PROCEDURE — 74018 RADEX ABDOMEN 1 VIEW: CPT

## 2018-01-01 PROCEDURE — 86901 BLOOD TYPING SEROLOGIC RH(D): CPT

## 2018-01-01 PROCEDURE — 88341 IMHCHEM/IMCYTCHM EA ADD ANTB: CPT

## 2018-01-01 PROCEDURE — 0JH60WZ INSERTION OF TOTALLY IMPLANTABLE VASCULAR ACCESS DEVICE INTO CHEST SUBCUTANEOUS TISSUE AND FASCIA, OPEN APPROACH: ICD-10-PCS | Performed by: RADIOLOGY

## 2018-01-01 PROCEDURE — 3700000000 HC ANESTHESIA ATTENDED CARE

## 2018-01-01 PROCEDURE — 2580000003 HC RX 258: Performed by: FAMILY MEDICINE

## 2018-01-01 PROCEDURE — C2617 STENT, NON-COR, TEM W/O DEL: HCPCS | Performed by: UROLOGY

## 2018-01-01 PROCEDURE — 6370000000 HC RX 637 (ALT 250 FOR IP): Performed by: UROLOGY

## 2018-01-01 PROCEDURE — 99285 EMERGENCY DEPT VISIT HI MDM: CPT

## 2018-01-01 PROCEDURE — 85055 RETICULATED PLATELET ASSAY: CPT

## 2018-01-01 PROCEDURE — 84157 ASSAY OF PROTEIN OTHER: CPT

## 2018-01-01 PROCEDURE — 88342 IMHCHEM/IMCYTCHM 1ST ANTB: CPT

## 2018-01-01 PROCEDURE — 36558 INSERT TUNNELED CV CATH: CPT | Performed by: RADIOLOGY

## 2018-01-01 PROCEDURE — 6370000000 HC RX 637 (ALT 250 FOR IP): Performed by: NURSE PRACTITIONER

## 2018-01-01 PROCEDURE — 6360000004 HC RX CONTRAST MEDICATION: Performed by: UROLOGY

## 2018-01-01 PROCEDURE — 87340 HEPATITIS B SURFACE AG IA: CPT

## 2018-01-01 PROCEDURE — C1729 CATH, DRAINAGE: HCPCS

## 2018-01-01 PROCEDURE — 82150 ASSAY OF AMYLASE: CPT

## 2018-01-01 PROCEDURE — 99253 IP/OBS CNSLTJ NEW/EST LOW 45: CPT | Performed by: INTERNAL MEDICINE

## 2018-01-01 PROCEDURE — 99255 IP/OBS CONSLTJ NEW/EST HI 80: CPT | Performed by: INTERNAL MEDICINE

## 2018-01-01 PROCEDURE — 87070 CULTURE OTHR SPECIMN AEROBIC: CPT

## 2018-01-01 PROCEDURE — 97162 PT EVAL MOD COMPLEX 30 MIN: CPT

## 2018-01-01 PROCEDURE — G0008 ADMIN INFLUENZA VIRUS VAC: HCPCS | Performed by: INTERNAL MEDICINE

## 2018-01-01 PROCEDURE — 7100000000 HC PACU RECOVERY - FIRST 15 MIN

## 2018-01-01 PROCEDURE — 6370000000 HC RX 637 (ALT 250 FOR IP): Performed by: FAMILY MEDICINE

## 2018-01-01 PROCEDURE — S0028 INJECTION, FAMOTIDINE, 20 MG: HCPCS | Performed by: FAMILY MEDICINE

## 2018-01-01 PROCEDURE — 83874 ASSAY OF MYOGLOBIN: CPT

## 2018-01-01 PROCEDURE — 2580000003 HC RX 258: Performed by: NURSE PRACTITIONER

## 2018-01-01 PROCEDURE — 71046 X-RAY EXAM CHEST 2 VIEWS: CPT

## 2018-01-01 PROCEDURE — 83735 ASSAY OF MAGNESIUM: CPT

## 2018-01-01 PROCEDURE — 50432 PLMT NEPHROSTOMY CATHETER: CPT | Performed by: RADIOLOGY

## 2018-01-01 PROCEDURE — 82945 GLUCOSE OTHER FLUID: CPT

## 2018-01-01 PROCEDURE — 82330 ASSAY OF CALCIUM: CPT

## 2018-01-01 PROCEDURE — 3600000012 HC SURGERY LEVEL 2 ADDTL 15MIN: Performed by: UROLOGY

## 2018-01-01 PROCEDURE — 84300 ASSAY OF URINE SODIUM: CPT

## 2018-01-01 PROCEDURE — 96376 TX/PRO/DX INJ SAME DRUG ADON: CPT

## 2018-01-01 PROCEDURE — 99254 IP/OBS CNSLTJ NEW/EST MOD 60: CPT | Performed by: INTERNAL MEDICINE

## 2018-01-01 PROCEDURE — 87075 CULTR BACTERIA EXCEPT BLOOD: CPT

## 2018-01-01 PROCEDURE — C1758 CATHETER, URETERAL: HCPCS | Performed by: UROLOGY

## 2018-01-01 PROCEDURE — 2500000003 HC RX 250 WO HCPCS: Performed by: INTERNAL MEDICINE

## 2018-01-01 PROCEDURE — 88305 TISSUE EXAM BY PATHOLOGIST: CPT

## 2018-01-01 PROCEDURE — 49083 ABD PARACENTESIS W/IMAGING: CPT

## 2018-01-01 PROCEDURE — G8979 MOBILITY GOAL STATUS: HCPCS

## 2018-01-01 PROCEDURE — 87086 URINE CULTURE/COLONY COUNT: CPT

## 2018-01-01 PROCEDURE — 83615 LACTATE (LD) (LDH) ENZYME: CPT

## 2018-01-01 PROCEDURE — 93306 TTE W/DOPPLER COMPLETE: CPT

## 2018-01-01 PROCEDURE — 97166 OT EVAL MOD COMPLEX 45 MIN: CPT

## 2018-01-01 PROCEDURE — 86920 COMPATIBILITY TEST SPIN: CPT

## 2018-01-01 PROCEDURE — 77001 FLUOROGUIDE FOR VEIN DEVICE: CPT | Performed by: RADIOLOGY

## 2018-01-01 PROCEDURE — 2580000003 HC RX 258: Performed by: UROLOGY

## 2018-01-01 PROCEDURE — 86900 BLOOD TYPING SEROLOGIC ABO: CPT

## 2018-01-01 PROCEDURE — 2580000003 HC RX 258: Performed by: ANESTHESIOLOGY

## 2018-01-01 PROCEDURE — 2580000003 HC RX 258: Performed by: NURSE ANESTHETIST, CERTIFIED REGISTERED

## 2018-01-01 PROCEDURE — 85014 HEMATOCRIT: CPT

## 2018-01-01 PROCEDURE — 84540 ASSAY OF URINE/UREA-N: CPT

## 2018-01-01 PROCEDURE — 82570 ASSAY OF URINE CREATININE: CPT

## 2018-01-01 PROCEDURE — P9040 RBC LEUKOREDUCED IRRADIATED: HCPCS

## 2018-01-01 PROCEDURE — G8987 SELF CARE CURRENT STATUS: HCPCS | Performed by: OCCUPATIONAL THERAPIST

## 2018-01-01 PROCEDURE — 76937 US GUIDE VASCULAR ACCESS: CPT | Performed by: RADIOLOGY

## 2018-01-01 PROCEDURE — 88108 CYTOPATH CONCENTRATE TECH: CPT

## 2018-01-01 PROCEDURE — C1769 GUIDE WIRE: HCPCS | Performed by: UROLOGY

## 2018-01-01 PROCEDURE — 80076 HEPATIC FUNCTION PANEL: CPT

## 2018-01-01 PROCEDURE — 2500000003 HC RX 250 WO HCPCS: Performed by: STUDENT IN AN ORGANIZED HEALTH CARE EDUCATION/TRAINING PROGRAM

## 2018-01-01 PROCEDURE — 87077 CULTURE AEROBIC IDENTIFY: CPT

## 2018-01-01 PROCEDURE — 94664 DEMO&/EVAL PT USE INHALER: CPT

## 2018-01-01 PROCEDURE — 99231 SBSQ HOSP IP/OBS SF/LOW 25: CPT | Performed by: OBSTETRICS & GYNECOLOGY

## 2018-01-01 PROCEDURE — 84484 ASSAY OF TROPONIN QUANT: CPT

## 2018-01-01 PROCEDURE — 84439 ASSAY OF FREE THYROXINE: CPT

## 2018-01-01 PROCEDURE — 84550 ASSAY OF BLOOD/URIC ACID: CPT

## 2018-01-01 PROCEDURE — 3600000015 HC SURGERY LEVEL 5 ADDTL 15MIN: Performed by: UROLOGY

## 2018-01-01 PROCEDURE — 74420 UROGRAPHY RTRGR +-KUB: CPT

## 2018-01-01 PROCEDURE — G8987 SELF CARE CURRENT STATUS: HCPCS

## 2018-01-01 PROCEDURE — 99223 1ST HOSP IP/OBS HIGH 75: CPT | Performed by: INTERNAL MEDICINE

## 2018-01-01 PROCEDURE — 90935 HEMODIALYSIS ONE EVALUATION: CPT

## 2018-01-01 PROCEDURE — 82607 VITAMIN B-12: CPT

## 2018-01-01 PROCEDURE — C2617 STENT, NON-COR, TEM W/O DEL: HCPCS

## 2018-01-01 PROCEDURE — 3600000005 HC SURGERY LEVEL 5 BASE: Performed by: UROLOGY

## 2018-01-01 PROCEDURE — 0W9G3ZX DRAINAGE OF PERITONEAL CAVITY, PERCUTANEOUS APPROACH, DIAGNOSTIC: ICD-10-PCS | Performed by: INTERNAL MEDICINE

## 2018-01-01 PROCEDURE — G8988 SELF CARE GOAL STATUS: HCPCS | Performed by: OCCUPATIONAL THERAPIST

## 2018-01-01 PROCEDURE — C1750 CATH, HEMODIALYSIS,LONG-TERM: HCPCS

## 2018-01-01 PROCEDURE — 83010 ASSAY OF HAPTOGLOBIN QUANT: CPT

## 2018-01-01 PROCEDURE — 82533 TOTAL CORTISOL: CPT

## 2018-01-01 PROCEDURE — 82728 ASSAY OF FERRITIN: CPT

## 2018-01-01 PROCEDURE — 2500000003 HC RX 250 WO HCPCS: Performed by: RADIOLOGY

## 2018-01-01 PROCEDURE — 76856 US EXAM PELVIC COMPLETE: CPT

## 2018-01-01 PROCEDURE — 85384 FIBRINOGEN ACTIVITY: CPT

## 2018-01-01 PROCEDURE — 7100000001 HC PACU RECOVERY - ADDTL 15 MIN

## 2018-01-01 PROCEDURE — 0TP98DZ REMOVAL OF INTRALUMINAL DEVICE FROM URETER, VIA NATURAL OR ARTIFICIAL OPENING ENDOSCOPIC: ICD-10-PCS | Performed by: UROLOGY

## 2018-01-01 PROCEDURE — 86803 HEPATITIS C AB TEST: CPT

## 2018-01-01 DEVICE — URETERAL STENT
Type: IMPLANTABLE DEVICE | Site: URETER | Status: NON-FUNCTIONAL
Brand: POLARIS™ ULTRA
Removed: 2018-03-07

## 2018-01-01 DEVICE — URETERAL STENT
Type: IMPLANTABLE DEVICE | Site: URETER | Status: FUNCTIONAL
Brand: CONTOUR™

## 2018-01-01 RX ORDER — DEXTROSE MONOHYDRATE 25 G/50ML
25 INJECTION, SOLUTION INTRAVENOUS ONCE
Status: COMPLETED | OUTPATIENT
Start: 2018-01-01 | End: 2018-01-01

## 2018-01-01 RX ORDER — HEPARIN SODIUM 1000 [USP'U]/ML
10000 INJECTION, SOLUTION INTRAVENOUS; SUBCUTANEOUS PRN
Status: DISCONTINUED | OUTPATIENT
Start: 2018-01-01 | End: 2018-01-01

## 2018-01-01 RX ORDER — ACETAMINOPHEN 325 MG/1
650 TABLET ORAL EVERY 4 HOURS PRN
Status: DISCONTINUED | OUTPATIENT
Start: 2018-01-01 | End: 2018-01-01 | Stop reason: HOSPADM

## 2018-01-01 RX ORDER — DEXTROSE MONOHYDRATE 50 MG/ML
100 INJECTION, SOLUTION INTRAVENOUS PRN
Status: DISCONTINUED | OUTPATIENT
Start: 2018-01-01 | End: 2018-01-01 | Stop reason: SDUPTHER

## 2018-01-01 RX ORDER — POTASSIUM CHLORIDE 20 MEQ/1
20 TABLET, EXTENDED RELEASE ORAL
Status: DISCONTINUED | OUTPATIENT
Start: 2018-01-01 | End: 2018-01-01 | Stop reason: HOSPADM

## 2018-01-01 RX ORDER — ALBUMIN (HUMAN) 12.5 G/50ML
25 SOLUTION INTRAVENOUS PRN
Status: DISCONTINUED | OUTPATIENT
Start: 2018-01-01 | End: 2018-01-01 | Stop reason: HOSPADM

## 2018-01-01 RX ORDER — LEVOTHYROXINE SODIUM 0.03 MG/1
25 TABLET ORAL DAILY
Status: DISCONTINUED | OUTPATIENT
Start: 2018-01-01 | End: 2018-01-01

## 2018-01-01 RX ORDER — SODIUM CHLORIDE 9 MG/ML
INJECTION, SOLUTION INTRAVENOUS CONTINUOUS PRN
Status: DISCONTINUED | OUTPATIENT
Start: 2018-01-01 | End: 2018-01-01 | Stop reason: SDUPTHER

## 2018-01-01 RX ORDER — DEXTROSE MONOHYDRATE 25 G/50ML
25 INJECTION, SOLUTION INTRAVENOUS PRN
Status: DISCONTINUED | OUTPATIENT
Start: 2018-01-01 | End: 2018-01-01 | Stop reason: HOSPADM

## 2018-01-01 RX ORDER — ONDANSETRON 2 MG/ML
4 INJECTION INTRAMUSCULAR; INTRAVENOUS EVERY 6 HOURS PRN
Status: DISCONTINUED | OUTPATIENT
Start: 2018-01-01 | End: 2018-01-01

## 2018-01-01 RX ORDER — ALBUMIN (HUMAN) 12.5 G/50ML
50 SOLUTION INTRAVENOUS ONCE
Status: COMPLETED | OUTPATIENT
Start: 2018-01-01 | End: 2018-01-01

## 2018-01-01 RX ORDER — DEXTROSE MONOHYDRATE 25 G/50ML
12.5 INJECTION, SOLUTION INTRAVENOUS PRN
Status: DISCONTINUED | OUTPATIENT
Start: 2018-01-01 | End: 2018-01-01 | Stop reason: SDUPTHER

## 2018-01-01 RX ORDER — HEPARIN SODIUM 1000 [USP'U]/ML
10000 INJECTION, SOLUTION INTRAVENOUS; SUBCUTANEOUS ONCE
Status: COMPLETED | OUTPATIENT
Start: 2018-01-01 | End: 2018-01-01

## 2018-01-01 RX ORDER — CEFAZOLIN SODIUM 1 G/3ML
INJECTION, POWDER, FOR SOLUTION INTRAMUSCULAR; INTRAVENOUS PRN
Status: DISCONTINUED | OUTPATIENT
Start: 2018-01-01 | End: 2018-01-01 | Stop reason: SDUPTHER

## 2018-01-01 RX ORDER — PROPOFOL 10 MG/ML
INJECTION, EMULSION INTRAVENOUS PRN
Status: DISCONTINUED | OUTPATIENT
Start: 2018-01-01 | End: 2018-01-01 | Stop reason: SDUPTHER

## 2018-01-01 RX ORDER — SODIUM CHLORIDE 0.9 % (FLUSH) 0.9 %
10 SYRINGE (ML) INJECTION PRN
Status: DISCONTINUED | OUTPATIENT
Start: 2018-01-01 | End: 2018-01-01

## 2018-01-01 RX ORDER — ONDANSETRON 2 MG/ML
4 INJECTION INTRAMUSCULAR; INTRAVENOUS ONCE
Status: COMPLETED | OUTPATIENT
Start: 2018-01-01 | End: 2018-01-01

## 2018-01-01 RX ORDER — 0.9 % SODIUM CHLORIDE 0.9 %
150 INTRAVENOUS SOLUTION INTRAVENOUS PRN
Status: DISCONTINUED | OUTPATIENT
Start: 2018-01-01 | End: 2018-01-01 | Stop reason: HOSPADM

## 2018-01-01 RX ORDER — NICOTINE POLACRILEX 4 MG
15 LOZENGE BUCCAL PRN
Status: DISCONTINUED | OUTPATIENT
Start: 2018-01-01 | End: 2018-01-01 | Stop reason: SDUPTHER

## 2018-01-01 RX ORDER — ALBUMIN (HUMAN) 12.5 G/50ML
25 SOLUTION INTRAVENOUS EVERY 8 HOURS
Status: COMPLETED | OUTPATIENT
Start: 2018-01-01 | End: 2018-01-01

## 2018-01-01 RX ORDER — CIPROFLOXACIN 2 MG/ML
INJECTION, SOLUTION INTRAVENOUS PRN
Status: DISCONTINUED | OUTPATIENT
Start: 2018-01-01 | End: 2018-01-01 | Stop reason: SDUPTHER

## 2018-01-01 RX ORDER — SODIUM CHLORIDE 0.9 % (FLUSH) 0.9 %
10 SYRINGE (ML) INJECTION EVERY 12 HOURS SCHEDULED
Status: DISCONTINUED | OUTPATIENT
Start: 2018-01-01 | End: 2018-01-01

## 2018-01-01 RX ORDER — MIDODRINE HYDROCHLORIDE 5 MG/1
5 TABLET ORAL
Qty: 90 TABLET | Refills: 3 | Status: SHIPPED | OUTPATIENT
Start: 2018-01-01

## 2018-01-01 RX ORDER — SODIUM POLYSTYRENE SULFONATE 15 G/60ML
30 SUSPENSION ORAL; RECTAL ONCE
Status: COMPLETED | OUTPATIENT
Start: 2018-01-01 | End: 2018-01-01

## 2018-01-01 RX ORDER — FENTANYL CITRATE 50 UG/ML
50 INJECTION, SOLUTION INTRAMUSCULAR; INTRAVENOUS ONCE
Status: COMPLETED | OUTPATIENT
Start: 2018-01-01 | End: 2018-01-01

## 2018-01-01 RX ORDER — ONDANSETRON 2 MG/ML
4 INJECTION INTRAMUSCULAR; INTRAVENOUS
Status: DISCONTINUED | OUTPATIENT
Start: 2018-01-01 | End: 2018-01-01 | Stop reason: HOSPADM

## 2018-01-01 RX ORDER — LEVOTHYROXINE SODIUM 0.05 MG/1
50 TABLET ORAL DAILY
Qty: 30 TABLET | Refills: 3 | Status: SHIPPED | OUTPATIENT
Start: 2018-01-01

## 2018-01-01 RX ORDER — FENTANYL CITRATE 50 UG/ML
INJECTION, SOLUTION INTRAMUSCULAR; INTRAVENOUS PRN
Status: DISCONTINUED | OUTPATIENT
Start: 2018-01-01 | End: 2018-01-01 | Stop reason: SDUPTHER

## 2018-01-01 RX ORDER — FENTANYL CITRATE 50 UG/ML
100 INJECTION, SOLUTION INTRAMUSCULAR; INTRAVENOUS ONCE
Status: COMPLETED | OUTPATIENT
Start: 2018-01-01 | End: 2018-01-01

## 2018-01-01 RX ORDER — ONDANSETRON 2 MG/ML
INJECTION INTRAMUSCULAR; INTRAVENOUS PRN
Status: DISCONTINUED | OUTPATIENT
Start: 2018-01-01 | End: 2018-01-01 | Stop reason: SDUPTHER

## 2018-01-01 RX ORDER — HEPARIN SODIUM 1000 [USP'U]/ML
1900 INJECTION, SOLUTION INTRAVENOUS; SUBCUTANEOUS PRN
Status: DISCONTINUED | OUTPATIENT
Start: 2018-01-01 | End: 2018-01-01

## 2018-01-01 RX ORDER — FLUDROCORTISONE ACETATE 0.1 MG/1
0.1 TABLET ORAL ONCE
Status: COMPLETED | OUTPATIENT
Start: 2018-01-01 | End: 2018-01-01

## 2018-01-01 RX ORDER — MIDODRINE HYDROCHLORIDE 5 MG/1
5 TABLET ORAL
Status: DISCONTINUED | OUTPATIENT
Start: 2018-01-01 | End: 2018-01-01 | Stop reason: HOSPADM

## 2018-01-01 RX ORDER — TRAMADOL HYDROCHLORIDE 50 MG/1
50 TABLET ORAL EVERY 6 HOURS PRN
Qty: 20 TABLET | Refills: 0 | Status: SHIPPED | OUTPATIENT
Start: 2018-01-01 | End: 2018-01-01

## 2018-01-01 RX ORDER — METRONIDAZOLE 500 MG/1
500 TABLET ORAL EVERY 8 HOURS SCHEDULED
Qty: 30 TABLET | Refills: 0 | Status: ON HOLD | OUTPATIENT
Start: 2018-01-01 | End: 2018-01-01 | Stop reason: HOSPADM

## 2018-01-01 RX ORDER — FUROSEMIDE 40 MG/1
40 TABLET ORAL DAILY
Status: DISCONTINUED | OUTPATIENT
Start: 2018-01-01 | End: 2018-01-01

## 2018-01-01 RX ORDER — DEXTROSE MONOHYDRATE 25 G/50ML
25 INJECTION, SOLUTION INTRAVENOUS PRN
Status: DISCONTINUED | OUTPATIENT
Start: 2018-01-01 | End: 2018-01-01

## 2018-01-01 RX ORDER — MORPHINE SULFATE 2 MG/ML
2 INJECTION, SOLUTION INTRAMUSCULAR; INTRAVENOUS EVERY 5 MIN PRN
Status: DISCONTINUED | OUTPATIENT
Start: 2018-01-01 | End: 2018-01-01 | Stop reason: HOSPADM

## 2018-01-01 RX ORDER — HEPARIN SODIUM 1000 [USP'U]/ML
5000 INJECTION, SOLUTION INTRAVENOUS; SUBCUTANEOUS PRN
Status: DISCONTINUED | OUTPATIENT
Start: 2018-01-01 | End: 2018-01-01

## 2018-01-01 RX ORDER — FOLIC ACID 1 MG/1
1 TABLET ORAL DAILY
Status: DISCONTINUED | OUTPATIENT
Start: 2018-01-01 | End: 2018-01-01

## 2018-01-01 RX ORDER — SODIUM CHLORIDE 9 MG/ML
INJECTION, SOLUTION INTRAVENOUS CONTINUOUS
Status: CANCELLED | OUTPATIENT
Start: 2018-01-01

## 2018-01-01 RX ORDER — ALBUMIN, HUMAN INJ 5% 5 %
25 SOLUTION INTRAVENOUS ONCE
Status: DISCONTINUED | OUTPATIENT
Start: 2018-01-01 | End: 2018-01-01 | Stop reason: HOSPADM

## 2018-01-01 RX ORDER — OXYCODONE HYDROCHLORIDE 5 MG/1
5 TABLET ORAL ONCE
Status: COMPLETED | OUTPATIENT
Start: 2018-01-01 | End: 2018-01-01

## 2018-01-01 RX ORDER — ALBUMIN (HUMAN) 12.5 G/50ML
25 SOLUTION INTRAVENOUS ONCE
Status: COMPLETED | OUTPATIENT
Start: 2018-01-01 | End: 2018-01-01

## 2018-01-01 RX ORDER — FENTANYL CITRATE 50 UG/ML
25 INJECTION, SOLUTION INTRAMUSCULAR; INTRAVENOUS ONCE
Status: COMPLETED | OUTPATIENT
Start: 2018-01-01 | End: 2018-01-01

## 2018-01-01 RX ORDER — CIPROFLOXACIN 2 MG/ML
400 INJECTION, SOLUTION INTRAVENOUS ONCE
Status: COMPLETED | OUTPATIENT
Start: 2018-01-01 | End: 2018-01-01

## 2018-01-01 RX ORDER — ROCURONIUM BROMIDE 10 MG/ML
INJECTION, SOLUTION INTRAVENOUS PRN
Status: DISCONTINUED | OUTPATIENT
Start: 2018-01-01 | End: 2018-01-01 | Stop reason: SDUPTHER

## 2018-01-01 RX ORDER — OXYCODONE HYDROCHLORIDE AND ACETAMINOPHEN 5; 325 MG/1; MG/1
1 TABLET ORAL PRN
Status: ACTIVE | OUTPATIENT
Start: 2018-01-01 | End: 2018-01-01

## 2018-01-01 RX ORDER — FLUCONAZOLE 200 MG/1
200 TABLET ORAL DAILY
Status: COMPLETED | OUTPATIENT
Start: 2018-01-01 | End: 2018-01-01

## 2018-01-01 RX ORDER — MORPHINE SULFATE 2 MG/ML
2 INJECTION, SOLUTION INTRAMUSCULAR; INTRAVENOUS EVERY 4 HOURS PRN
Status: DISCONTINUED | OUTPATIENT
Start: 2018-01-01 | End: 2018-01-01 | Stop reason: HOSPADM

## 2018-01-01 RX ORDER — DIPHENHYDRAMINE HYDROCHLORIDE 50 MG/ML
12.5 INJECTION INTRAMUSCULAR; INTRAVENOUS
Status: DISCONTINUED | OUTPATIENT
Start: 2018-01-01 | End: 2018-01-01 | Stop reason: HOSPADM

## 2018-01-01 RX ORDER — SODIUM POLYSTYRENE SULFONATE 15 G/60ML
15 SUSPENSION ORAL; RECTAL ONCE
Status: DISCONTINUED | OUTPATIENT
Start: 2018-01-01 | End: 2018-01-01

## 2018-01-01 RX ORDER — ALLOPURINOL 100 MG/1
100 TABLET ORAL DAILY
Status: DISCONTINUED | OUTPATIENT
Start: 2018-01-01 | End: 2018-01-01 | Stop reason: HOSPADM

## 2018-01-01 RX ORDER — LACTOBACILLUS RHAMNOSUS GG 10B CELL
1 CAPSULE ORAL 2 TIMES DAILY
Qty: 30 CAPSULE | Refills: 0 | Status: SHIPPED | OUTPATIENT
Start: 2018-01-01

## 2018-01-01 RX ORDER — FLUCONAZOLE 100 MG/1
100 TABLET ORAL DAILY
Status: DISCONTINUED | OUTPATIENT
Start: 2018-01-01 | End: 2018-01-01

## 2018-01-01 RX ORDER — 0.9 % SODIUM CHLORIDE 0.9 %
500 INTRAVENOUS SOLUTION INTRAVENOUS ONCE
Status: COMPLETED | OUTPATIENT
Start: 2018-01-01 | End: 2018-01-01

## 2018-01-01 RX ORDER — MAGNESIUM HYDROXIDE 1200 MG/15ML
LIQUID ORAL CONTINUOUS PRN
Status: DISCONTINUED | OUTPATIENT
Start: 2018-01-01 | End: 2018-01-01 | Stop reason: HOSPADM

## 2018-01-01 RX ORDER — ONDANSETRON 2 MG/ML
4 INJECTION INTRAMUSCULAR; INTRAVENOUS EVERY 6 HOURS PRN
Status: DISCONTINUED | OUTPATIENT
Start: 2018-01-01 | End: 2018-01-01 | Stop reason: HOSPADM

## 2018-01-01 RX ORDER — OXYCODONE HYDROCHLORIDE AND ACETAMINOPHEN 5; 325 MG/1; MG/1
2 TABLET ORAL PRN
Status: DISCONTINUED | OUTPATIENT
Start: 2018-01-01 | End: 2018-01-01 | Stop reason: HOSPADM

## 2018-01-01 RX ORDER — MORPHINE SULFATE 4 MG/ML
4 INJECTION, SOLUTION INTRAMUSCULAR; INTRAVENOUS ONCE
Status: COMPLETED | OUTPATIENT
Start: 2018-01-01 | End: 2018-01-01

## 2018-01-01 RX ORDER — POTASSIUM CHLORIDE 7.45 MG/ML
10 INJECTION INTRAVENOUS PRN
Status: DISCONTINUED | OUTPATIENT
Start: 2018-01-01 | End: 2018-01-01 | Stop reason: HOSPADM

## 2018-01-01 RX ORDER — DEXTROSE MONOHYDRATE 50 MG/ML
100 INJECTION, SOLUTION INTRAVENOUS PRN
Status: DISCONTINUED | OUTPATIENT
Start: 2018-01-01 | End: 2018-01-01

## 2018-01-01 RX ORDER — DEXAMETHASONE SODIUM PHOSPHATE 10 MG/ML
INJECTION INTRAMUSCULAR; INTRAVENOUS PRN
Status: DISCONTINUED | OUTPATIENT
Start: 2018-01-01 | End: 2018-01-01 | Stop reason: SDUPTHER

## 2018-01-01 RX ORDER — HEPARIN SODIUM 5000 [USP'U]/ML
INJECTION, SOLUTION INTRAVENOUS; SUBCUTANEOUS
Status: COMPLETED | OUTPATIENT
Start: 2018-01-01 | End: 2018-01-01

## 2018-01-01 RX ORDER — SODIUM CHLORIDE 0.9 % (FLUSH) 0.9 %
10 SYRINGE (ML) INJECTION PRN
Status: DISCONTINUED | OUTPATIENT
Start: 2018-01-01 | End: 2018-01-01 | Stop reason: HOSPADM

## 2018-01-01 RX ORDER — 0.9 % SODIUM CHLORIDE 0.9 %
500 INTRAVENOUS SOLUTION INTRAVENOUS ONCE
Status: DISCONTINUED | OUTPATIENT
Start: 2018-01-01 | End: 2018-01-01

## 2018-01-01 RX ORDER — OXYCODONE HYDROCHLORIDE AND ACETAMINOPHEN 5; 325 MG/1; MG/1
1 TABLET ORAL PRN
Status: DISCONTINUED | OUTPATIENT
Start: 2018-01-01 | End: 2018-01-01 | Stop reason: HOSPADM

## 2018-01-01 RX ORDER — FUROSEMIDE 10 MG/ML
40 INJECTION INTRAMUSCULAR; INTRAVENOUS ONCE
Status: COMPLETED | OUTPATIENT
Start: 2018-01-01 | End: 2018-01-01

## 2018-01-01 RX ORDER — ERGOCALCIFEROL 1.25 MG/1
50000 CAPSULE ORAL WEEKLY
Status: DISCONTINUED | OUTPATIENT
Start: 2018-01-01 | End: 2018-01-01 | Stop reason: HOSPADM

## 2018-01-01 RX ORDER — SODIUM CHLORIDE 9 MG/ML
INJECTION, SOLUTION INTRAVENOUS CONTINUOUS
Status: DISCONTINUED | OUTPATIENT
Start: 2018-01-01 | End: 2018-01-01

## 2018-01-01 RX ORDER — ONDANSETRON 2 MG/ML
4 INJECTION INTRAMUSCULAR; INTRAVENOUS
Status: ACTIVE | OUTPATIENT
Start: 2018-01-01 | End: 2018-01-01

## 2018-01-01 RX ORDER — FLUCONAZOLE 100 MG/1
100 TABLET ORAL DAILY
Status: DISCONTINUED | OUTPATIENT
Start: 2018-01-01 | End: 2018-01-01 | Stop reason: SDUPTHER

## 2018-01-01 RX ORDER — FLUCONAZOLE 200 MG/1
200 TABLET ORAL DAILY
Status: DISCONTINUED | OUTPATIENT
Start: 2018-01-01 | End: 2018-01-01 | Stop reason: HOSPADM

## 2018-01-01 RX ORDER — DEXTROSE MONOHYDRATE 25 G/50ML
25 INJECTION, SOLUTION INTRAVENOUS ONCE
Status: DISCONTINUED | OUTPATIENT
Start: 2018-01-01 | End: 2018-01-01 | Stop reason: SDUPTHER

## 2018-01-01 RX ORDER — MORPHINE SULFATE 2 MG/ML
1 INJECTION, SOLUTION INTRAMUSCULAR; INTRAVENOUS ONCE
Status: COMPLETED | OUTPATIENT
Start: 2018-01-01 | End: 2018-01-01

## 2018-01-01 RX ORDER — CALCIUM CARBONATE 200(500)MG
500 TABLET,CHEWABLE ORAL 2 TIMES DAILY PRN
Status: DISCONTINUED | OUTPATIENT
Start: 2018-01-01 | End: 2018-01-01 | Stop reason: HOSPADM

## 2018-01-01 RX ORDER — ALBUMIN (HUMAN) 12.5 G/50ML
SOLUTION INTRAVENOUS CONTINUOUS PRN
Status: COMPLETED | OUTPATIENT
Start: 2018-01-01 | End: 2018-01-01

## 2018-01-01 RX ORDER — DICYCLOMINE HYDROCHLORIDE 10 MG/1
10 CAPSULE ORAL ONCE
Status: COMPLETED | OUTPATIENT
Start: 2018-01-01 | End: 2018-01-01

## 2018-01-01 RX ORDER — TRAMADOL HYDROCHLORIDE 50 MG/1
50 TABLET ORAL EVERY 6 HOURS PRN
COMMUNITY

## 2018-01-01 RX ORDER — 0.9 % SODIUM CHLORIDE 0.9 %
1000 INTRAVENOUS SOLUTION INTRAVENOUS ONCE
Status: COMPLETED | OUTPATIENT
Start: 2018-01-01 | End: 2018-01-01

## 2018-01-01 RX ORDER — FLUDROCORTISONE ACETATE 0.1 MG/1
0.2 TABLET ORAL ONCE
Status: COMPLETED | OUTPATIENT
Start: 2018-01-01 | End: 2018-01-01

## 2018-01-01 RX ORDER — LEVOTHYROXINE SODIUM 0.05 MG/1
50 TABLET ORAL DAILY
Status: DISCONTINUED | OUTPATIENT
Start: 2018-01-01 | End: 2018-01-01

## 2018-01-01 RX ORDER — FLUCONAZOLE 200 MG/1
200 TABLET ORAL DAILY
Qty: 10 TABLET | Refills: 0 | Status: SHIPPED | OUTPATIENT
Start: 2018-01-01 | End: 2018-01-01

## 2018-01-01 RX ORDER — DEXTROSE MONOHYDRATE 25 G/50ML
25 INJECTION, SOLUTION INTRAVENOUS ONCE
Status: DISCONTINUED | OUTPATIENT
Start: 2018-01-01 | End: 2018-01-01 | Stop reason: ALTCHOICE

## 2018-01-01 RX ORDER — LIDOCAINE HYDROCHLORIDE 10 MG/ML
INJECTION, SOLUTION EPIDURAL; INFILTRATION; INTRACAUDAL; PERINEURAL PRN
Status: DISCONTINUED | OUTPATIENT
Start: 2018-01-01 | End: 2018-01-01 | Stop reason: SDUPTHER

## 2018-01-01 RX ORDER — MIDODRINE HYDROCHLORIDE 5 MG/1
5 TABLET ORAL
Status: DISCONTINUED | OUTPATIENT
Start: 2018-01-01 | End: 2018-01-01

## 2018-01-01 RX ORDER — LABETALOL HYDROCHLORIDE 5 MG/ML
5 INJECTION, SOLUTION INTRAVENOUS EVERY 10 MIN PRN
Status: DISCONTINUED | OUTPATIENT
Start: 2018-01-01 | End: 2018-01-01 | Stop reason: HOSPADM

## 2018-01-01 RX ORDER — MIDODRINE HYDROCHLORIDE 5 MG/1
5 TABLET ORAL PRN
Status: DISCONTINUED | OUTPATIENT
Start: 2018-01-01 | End: 2018-01-01 | Stop reason: HOSPADM

## 2018-01-01 RX ORDER — TRAMADOL HYDROCHLORIDE 50 MG/1
50 TABLET ORAL EVERY 4 HOURS PRN
Status: DISCONTINUED | OUTPATIENT
Start: 2018-01-01 | End: 2018-01-01 | Stop reason: HOSPADM

## 2018-01-01 RX ORDER — DEXTROSE MONOHYDRATE 25 G/50ML
25 INJECTION, SOLUTION INTRAVENOUS ONCE
Status: DISCONTINUED | OUTPATIENT
Start: 2018-01-01 | End: 2018-01-01

## 2018-01-01 RX ORDER — DOCUSATE SODIUM 100 MG/1
100 CAPSULE, LIQUID FILLED ORAL 2 TIMES DAILY PRN
Status: DISCONTINUED | OUTPATIENT
Start: 2018-01-01 | End: 2018-01-01 | Stop reason: HOSPADM

## 2018-01-01 RX ORDER — FUROSEMIDE 20 MG/1
20 TABLET ORAL DAILY
Status: DISCONTINUED | OUTPATIENT
Start: 2018-01-01 | End: 2018-01-01 | Stop reason: HOSPADM

## 2018-01-01 RX ORDER — DEXTROSE MONOHYDRATE 25 G/50ML
12.5 INJECTION, SOLUTION INTRAVENOUS PRN
Status: DISCONTINUED | OUTPATIENT
Start: 2018-01-01 | End: 2018-01-01

## 2018-01-01 RX ORDER — 0.9 % SODIUM CHLORIDE 0.9 %
250 INTRAVENOUS SOLUTION INTRAVENOUS ONCE
Status: DISCONTINUED | OUTPATIENT
Start: 2018-01-01 | End: 2018-01-01 | Stop reason: HOSPADM

## 2018-01-01 RX ORDER — 0.9 % SODIUM CHLORIDE 0.9 %
250 INTRAVENOUS SOLUTION INTRAVENOUS PRN
Status: DISCONTINUED | OUTPATIENT
Start: 2018-01-01 | End: 2018-01-01 | Stop reason: HOSPADM

## 2018-01-01 RX ORDER — LEVOTHYROXINE SODIUM 0.03 MG/1
25 TABLET ORAL DAILY
Status: DISCONTINUED | OUTPATIENT
Start: 2018-01-01 | End: 2018-01-01 | Stop reason: HOSPADM

## 2018-01-01 RX ORDER — SODIUM CHLORIDE, SODIUM LACTATE, POTASSIUM CHLORIDE, CALCIUM CHLORIDE 600; 310; 30; 20 MG/100ML; MG/100ML; MG/100ML; MG/100ML
INJECTION, SOLUTION INTRAVENOUS CONTINUOUS
Status: DISCONTINUED | OUTPATIENT
Start: 2018-01-01 | End: 2018-01-01

## 2018-01-01 RX ORDER — SODIUM CHLORIDE 9 MG/ML
INJECTION, SOLUTION INTRAVENOUS CONTINUOUS
Status: DISCONTINUED | OUTPATIENT
Start: 2018-01-01 | End: 2018-01-01 | Stop reason: HOSPADM

## 2018-01-01 RX ORDER — ACETAMINOPHEN 325 MG/1
650 TABLET ORAL EVERY 4 HOURS PRN
Status: DISCONTINUED | OUTPATIENT
Start: 2018-01-01 | End: 2018-01-01

## 2018-01-01 RX ORDER — FOLIC ACID 1 MG/1
1 TABLET ORAL DAILY
Status: DISCONTINUED | OUTPATIENT
Start: 2018-01-01 | End: 2018-01-01 | Stop reason: HOSPADM

## 2018-01-01 RX ORDER — SUCCINYLCHOLINE CHLORIDE 20 MG/ML
INJECTION INTRAMUSCULAR; INTRAVENOUS PRN
Status: DISCONTINUED | OUTPATIENT
Start: 2018-01-01 | End: 2018-01-01 | Stop reason: SDUPTHER

## 2018-01-01 RX ORDER — MAGNESIUM HYDROXIDE 1200 MG/15ML
60 LIQUID ORAL ONCE
Status: COMPLETED | OUTPATIENT
Start: 2018-01-01 | End: 2018-01-01

## 2018-01-01 RX ORDER — ONDANSETRON 2 MG/ML
4 INJECTION INTRAMUSCULAR; INTRAVENOUS EVERY 6 HOURS PRN
Status: DISCONTINUED | OUTPATIENT
Start: 2018-01-01 | End: 2018-01-01 | Stop reason: SDUPTHER

## 2018-01-01 RX ORDER — POTASSIUM CHLORIDE 20 MEQ/1
40 TABLET, EXTENDED RELEASE ORAL PRN
Status: DISCONTINUED | OUTPATIENT
Start: 2018-01-01 | End: 2018-01-01 | Stop reason: HOSPADM

## 2018-01-01 RX ORDER — LACTOBACILLUS RHAMNOSUS GG 10B CELL
1 CAPSULE ORAL 2 TIMES DAILY
Status: DISCONTINUED | OUTPATIENT
Start: 2018-01-01 | End: 2018-01-01 | Stop reason: HOSPADM

## 2018-01-01 RX ORDER — DEXTROSE MONOHYDRATE 50 MG/ML
100 INJECTION, SOLUTION INTRAVENOUS PRN
Status: DISCONTINUED | OUTPATIENT
Start: 2018-01-01 | End: 2018-01-01 | Stop reason: HOSPADM

## 2018-01-01 RX ORDER — LEVOTHYROXINE SODIUM 0.05 MG/1
50 TABLET ORAL DAILY
Status: DISCONTINUED | OUTPATIENT
Start: 2018-01-01 | End: 2018-01-01 | Stop reason: HOSPADM

## 2018-01-01 RX ORDER — LEVOFLOXACIN 500 MG/1
500 TABLET, FILM COATED ORAL DAILY
Qty: 3 TABLET | Refills: 0 | Status: SHIPPED | OUTPATIENT
Start: 2018-01-01 | End: 2018-01-01

## 2018-01-01 RX ORDER — 0.9 % SODIUM CHLORIDE 0.9 %
100 INTRAVENOUS SOLUTION INTRAVENOUS ONCE
Status: COMPLETED | OUTPATIENT
Start: 2018-01-01 | End: 2018-01-01

## 2018-01-01 RX ORDER — FUROSEMIDE 10 MG/ML
40 INJECTION INTRAMUSCULAR; INTRAVENOUS DAILY
Status: DISCONTINUED | OUTPATIENT
Start: 2018-01-01 | End: 2018-01-01

## 2018-01-01 RX ORDER — CALCIUM CARBONATE 200(500)MG
500 TABLET,CHEWABLE ORAL 3 TIMES DAILY
Status: DISCONTINUED | OUTPATIENT
Start: 2018-01-01 | End: 2018-01-01

## 2018-01-01 RX ORDER — NICOTINE POLACRILEX 4 MG
15 LOZENGE BUCCAL PRN
Status: DISCONTINUED | OUTPATIENT
Start: 2018-01-01 | End: 2018-01-01 | Stop reason: HOSPADM

## 2018-01-01 RX ORDER — PROMETHAZINE HYDROCHLORIDE 25 MG/ML
12.5 INJECTION, SOLUTION INTRAMUSCULAR; INTRAVENOUS
Status: DISCONTINUED | OUTPATIENT
Start: 2018-01-01 | End: 2018-01-01 | Stop reason: HOSPADM

## 2018-01-01 RX ORDER — POTASSIUM CHLORIDE 20MEQ/15ML
40 LIQUID (ML) ORAL PRN
Status: DISCONTINUED | OUTPATIENT
Start: 2018-01-01 | End: 2018-01-01 | Stop reason: HOSPADM

## 2018-01-01 RX ORDER — LIDOCAINE HYDROCHLORIDE AND EPINEPHRINE BITARTRATE 20; .01 MG/ML; MG/ML
INJECTION, SOLUTION SUBCUTANEOUS
Status: COMPLETED | OUTPATIENT
Start: 2018-01-01 | End: 2018-01-01

## 2018-01-01 RX ORDER — CIPROFLOXACIN 2 MG/ML
400 INJECTION, SOLUTION INTRAVENOUS ONCE
Status: DISCONTINUED | OUTPATIENT
Start: 2018-01-01 | End: 2018-01-01

## 2018-01-01 RX ORDER — SODIUM CHLORIDE 0.9 % (FLUSH) 0.9 %
10 SYRINGE (ML) INJECTION EVERY 12 HOURS SCHEDULED
Status: DISCONTINUED | OUTPATIENT
Start: 2018-01-01 | End: 2018-01-01 | Stop reason: HOSPADM

## 2018-01-01 RX ORDER — METOPROLOL TARTRATE 5 MG/5ML
5 INJECTION INTRAVENOUS
Status: COMPLETED | OUTPATIENT
Start: 2018-01-01 | End: 2018-01-01

## 2018-01-01 RX ORDER — METRONIDAZOLE 500 MG/1
500 TABLET ORAL EVERY 8 HOURS SCHEDULED
Status: DISCONTINUED | OUTPATIENT
Start: 2018-01-01 | End: 2018-01-01 | Stop reason: HOSPADM

## 2018-01-01 RX ORDER — LIDOCAINE HYDROCHLORIDE 20 MG/ML
INJECTION, SOLUTION EPIDURAL; INFILTRATION; INTRACAUDAL; PERINEURAL
Status: COMPLETED | OUTPATIENT
Start: 2018-01-01 | End: 2018-01-01

## 2018-01-01 RX ORDER — HEPARIN SODIUM 10000 [USP'U]/100ML
18 INJECTION, SOLUTION INTRAVENOUS CONTINUOUS
Status: DISCONTINUED | OUTPATIENT
Start: 2018-01-01 | End: 2018-01-01

## 2018-01-01 RX ORDER — CIPROFLOXACIN 2 MG/ML
400 INJECTION, SOLUTION INTRAVENOUS EVERY 12 HOURS
Status: DISCONTINUED | OUTPATIENT
Start: 2018-01-01 | End: 2018-01-01

## 2018-01-01 RX ORDER — HEPARIN SODIUM 5000 [USP'U]/ML
5000 INJECTION, SOLUTION INTRAVENOUS; SUBCUTANEOUS EVERY 8 HOURS SCHEDULED
Status: DISCONTINUED | OUTPATIENT
Start: 2018-01-01 | End: 2018-01-01 | Stop reason: HOSPADM

## 2018-01-01 RX ORDER — HYDROCODONE BITARTRATE AND ACETAMINOPHEN 5; 325 MG/1; MG/1
1 TABLET ORAL EVERY 8 HOURS PRN
Qty: 10 TABLET | Refills: 0 | Status: SHIPPED | OUTPATIENT
Start: 2018-01-01 | End: 2018-01-01

## 2018-01-01 RX ORDER — CIPROFLOXACIN 2 MG/ML
400 INJECTION, SOLUTION INTRAVENOUS EVERY 24 HOURS
Status: DISCONTINUED | OUTPATIENT
Start: 2018-01-01 | End: 2018-01-01

## 2018-01-01 RX ORDER — ERGOCALCIFEROL (VITAMIN D2) 1250 MCG
50000 CAPSULE ORAL WEEKLY
Qty: 30 CAPSULE | Refills: 0 | Status: SHIPPED | OUTPATIENT
Start: 2018-01-01

## 2018-01-01 RX ORDER — HEPARIN SODIUM 10000 [USP'U]/100ML
2100 INJECTION, SOLUTION INTRAVENOUS CONTINUOUS
Status: DISPENSED | OUTPATIENT
Start: 2018-01-01 | End: 2018-01-01

## 2018-01-01 RX ORDER — DIPHENHYDRAMINE HYDROCHLORIDE 50 MG/ML
12.5 INJECTION INTRAMUSCULAR; INTRAVENOUS
Status: ACTIVE | OUTPATIENT
Start: 2018-01-01 | End: 2018-01-01

## 2018-01-01 RX ORDER — PROPOFOL 10 MG/ML
INJECTION, EMULSION INTRAVENOUS CONTINUOUS PRN
Status: DISCONTINUED | OUTPATIENT
Start: 2018-01-01 | End: 2018-01-01 | Stop reason: SDUPTHER

## 2018-01-01 RX ORDER — FENTANYL CITRATE 50 UG/ML
25 INJECTION, SOLUTION INTRAMUSCULAR; INTRAVENOUS EVERY 5 MIN PRN
Status: DISCONTINUED | OUTPATIENT
Start: 2018-01-01 | End: 2018-01-01 | Stop reason: HOSPADM

## 2018-01-01 RX ORDER — GLYCOPYRROLATE 0.2 MG/ML
INJECTION INTRAMUSCULAR; INTRAVENOUS PRN
Status: DISCONTINUED | OUTPATIENT
Start: 2018-01-01 | End: 2018-01-01 | Stop reason: SDUPTHER

## 2018-01-01 RX ORDER — NICOTINE POLACRILEX 4 MG
15 LOZENGE BUCCAL PRN
Status: DISCONTINUED | OUTPATIENT
Start: 2018-01-01 | End: 2018-01-01

## 2018-01-01 RX ORDER — ALBUMIN (HUMAN) 12.5 G/50ML
SOLUTION INTRAVENOUS CONTINUOUS PRN
Status: DISCONTINUED | OUTPATIENT
Start: 2018-01-01 | End: 2018-01-01 | Stop reason: HOSPADM

## 2018-01-01 RX ORDER — FENTANYL CITRATE 50 UG/ML
25 INJECTION, SOLUTION INTRAMUSCULAR; INTRAVENOUS
Status: DISCONTINUED | OUTPATIENT
Start: 2018-01-01 | End: 2018-01-01 | Stop reason: HOSPADM

## 2018-01-01 RX ORDER — OXYCODONE HYDROCHLORIDE AND ACETAMINOPHEN 5; 325 MG/1; MG/1
2 TABLET ORAL PRN
Status: ACTIVE | OUTPATIENT
Start: 2018-01-01 | End: 2018-01-01

## 2018-01-01 RX ORDER — DEXTROSE MONOHYDRATE 25 G/50ML
12.5 INJECTION, SOLUTION INTRAVENOUS PRN
Status: DISCONTINUED | OUTPATIENT
Start: 2018-01-01 | End: 2018-01-01 | Stop reason: HOSPADM

## 2018-01-01 RX ADMIN — APIXABAN 5 MG: 5 TABLET, FILM COATED ORAL at 21:50

## 2018-01-01 RX ADMIN — LEVOTHYROXINE SODIUM 50 MCG: 50 TABLET ORAL at 05:23

## 2018-01-01 RX ADMIN — ANTACID TABLETS 500 MG: 500 TABLET, CHEWABLE ORAL at 09:56

## 2018-01-01 RX ADMIN — Medication 10 ML: at 08:10

## 2018-01-01 RX ADMIN — APIXABAN 5 MG: 5 TABLET, FILM COATED ORAL at 09:01

## 2018-01-01 RX ADMIN — ACETAMINOPHEN 650 MG: 325 TABLET ORAL at 23:31

## 2018-01-01 RX ADMIN — ROCURONIUM BROMIDE 10 MG: 10 INJECTION INTRAVENOUS at 12:13

## 2018-01-01 RX ADMIN — METOPROLOL TARTRATE 12.5 MG: 25 TABLET ORAL at 20:48

## 2018-01-01 RX ADMIN — ONDANSETRON 4 MG: 2 INJECTION INTRAMUSCULAR; INTRAVENOUS at 10:42

## 2018-01-01 RX ADMIN — MICONAZOLE NITRATE: 20.6 POWDER TOPICAL at 16:15

## 2018-01-01 RX ADMIN — Medication 1.9 ML: at 11:24

## 2018-01-01 RX ADMIN — ACETAMINOPHEN 650 MG: 325 TABLET ORAL at 21:24

## 2018-01-01 RX ADMIN — SODIUM CHLORIDE: 9 INJECTION, SOLUTION INTRAVENOUS at 18:27

## 2018-01-01 RX ADMIN — CEFAZOLIN SODIUM 1 G: 1 INJECTION, SOLUTION INTRAVENOUS at 16:50

## 2018-01-01 RX ADMIN — HEPARIN SODIUM 10000 UNITS: 1000 INJECTION, SOLUTION INTRAVENOUS; SUBCUTANEOUS at 23:18

## 2018-01-01 RX ADMIN — ONDANSETRON 4 MG: 2 INJECTION INTRAMUSCULAR; INTRAVENOUS at 13:02

## 2018-01-01 RX ADMIN — CEFAZOLIN 1 G: 1 INJECTION, POWDER, FOR SOLUTION INTRAMUSCULAR; INTRAVENOUS at 14:33

## 2018-01-01 RX ADMIN — MIDODRINE HYDROCHLORIDE 5 MG: 5 TABLET ORAL at 13:09

## 2018-01-01 RX ADMIN — TRAMADOL HYDROCHLORIDE 50 MG: 50 TABLET, FILM COATED ORAL at 21:38

## 2018-01-01 RX ADMIN — FLUCONAZOLE 200 MG: 200 TABLET ORAL at 08:32

## 2018-01-01 RX ADMIN — SODIUM CHLORIDE: 9 INJECTION, SOLUTION INTRAVENOUS at 08:23

## 2018-01-01 RX ADMIN — ACETAMINOPHEN 650 MG: 325 TABLET ORAL at 16:32

## 2018-01-01 RX ADMIN — HEPARIN SODIUM 5000 UNITS: 5000 INJECTION, SOLUTION INTRAVENOUS; SUBCUTANEOUS at 05:25

## 2018-01-01 RX ADMIN — ERGOCALCIFEROL 50000 UNITS: 1.25 CAPSULE ORAL at 08:43

## 2018-01-01 RX ADMIN — Medication 10 ML: at 20:54

## 2018-01-01 RX ADMIN — ONDANSETRON 4 MG: 2 INJECTION, SOLUTION INTRAMUSCULAR; INTRAVENOUS at 13:35

## 2018-01-01 RX ADMIN — Medication 10 ML: at 12:14

## 2018-01-01 RX ADMIN — Medication 125 MG: at 06:59

## 2018-01-01 RX ADMIN — ANTACID TABLETS 500 MG: 500 TABLET, CHEWABLE ORAL at 22:10

## 2018-01-01 RX ADMIN — METRONIDAZOLE 500 MG: 500 TABLET ORAL at 16:15

## 2018-01-01 RX ADMIN — METOPROLOL TARTRATE 12.5 MG: 25 TABLET ORAL at 09:02

## 2018-01-01 RX ADMIN — HEPARIN SODIUM AND DEXTROSE 18 UNITS/KG/HR: 10000; 5 INJECTION INTRAVENOUS at 05:28

## 2018-01-01 RX ADMIN — Medication 10 ML: at 20:42

## 2018-01-01 RX ADMIN — Medication 125 MG: at 13:09

## 2018-01-01 RX ADMIN — Medication 125 MG: at 17:30

## 2018-01-01 RX ADMIN — FENTANYL CITRATE 25 MCG: 50 INJECTION INTRAMUSCULAR; INTRAVENOUS at 18:29

## 2018-01-01 RX ADMIN — Medication 125 MG: at 05:28

## 2018-01-01 RX ADMIN — FLUCONAZOLE 200 MG: 200 TABLET ORAL at 10:54

## 2018-01-01 RX ADMIN — Medication 125 MG: at 05:40

## 2018-01-01 RX ADMIN — APIXABAN 5 MG: 5 TABLET, FILM COATED ORAL at 09:33

## 2018-01-01 RX ADMIN — MIDODRINE HYDROCHLORIDE 5 MG: 5 TABLET ORAL at 16:01

## 2018-01-01 RX ADMIN — SODIUM CHLORIDE: 9 INJECTION, SOLUTION INTRAVENOUS at 21:57

## 2018-01-01 RX ADMIN — Medication 10 ML: at 20:15

## 2018-01-01 RX ADMIN — MORPHINE SULFATE 2 MG: 2 INJECTION, SOLUTION INTRAMUSCULAR; INTRAVENOUS at 06:28

## 2018-01-01 RX ADMIN — MIDODRINE HYDROCHLORIDE 5 MG: 5 TABLET ORAL at 18:06

## 2018-01-01 RX ADMIN — DEXTROSE MONOHYDRATE 25 G: 25 INJECTION, SOLUTION INTRAVENOUS at 08:41

## 2018-01-01 RX ADMIN — Medication 150 MEQ: at 13:35

## 2018-01-01 RX ADMIN — METOPROLOL TARTRATE 12.5 MG: 25 TABLET ORAL at 08:13

## 2018-01-01 RX ADMIN — MORPHINE SULFATE 1 MG: 2 INJECTION, SOLUTION INTRAMUSCULAR; INTRAVENOUS at 12:06

## 2018-01-01 RX ADMIN — Medication 1 CAPSULE: at 22:02

## 2018-01-01 RX ADMIN — PROPOFOL 200 MG: 10 INJECTION, EMULSION INTRAVENOUS at 12:13

## 2018-01-01 RX ADMIN — IOTHALAMATE MEGLUMINE 65 ML: 430 INJECTION INTRAVASCULAR at 14:32

## 2018-01-01 RX ADMIN — FENTANYL CITRATE 100 MCG: 50 INJECTION, SOLUTION INTRAMUSCULAR; INTRAVENOUS at 18:14

## 2018-01-01 RX ADMIN — ENOXAPARIN SODIUM 160 MG: 100 INJECTION SUBCUTANEOUS at 08:03

## 2018-01-01 RX ADMIN — LEVOTHYROXINE SODIUM 25 MCG: 25 TABLET ORAL at 06:27

## 2018-01-01 RX ADMIN — Medication 125 MG: at 23:47

## 2018-01-01 RX ADMIN — SODIUM CHLORIDE: 9 INJECTION, SOLUTION INTRAVENOUS at 00:06

## 2018-01-01 RX ADMIN — Medication 125 MG: at 23:41

## 2018-01-01 RX ADMIN — ACETAMINOPHEN 650 MG: 325 TABLET ORAL at 21:44

## 2018-01-01 RX ADMIN — ALBUMIN (HUMAN) 25 G: 0.25 INJECTION, SOLUTION INTRAVENOUS at 18:44

## 2018-01-01 RX ADMIN — ANTACID TABLETS 500 MG: 500 TABLET, CHEWABLE ORAL at 20:51

## 2018-01-01 RX ADMIN — INSULIN HUMAN 10 UNITS: 100 INJECTION, SOLUTION PARENTERAL at 08:41

## 2018-01-01 RX ADMIN — SODIUM CHLORIDE: 9 INJECTION, SOLUTION INTRAVENOUS at 08:41

## 2018-01-01 RX ADMIN — DEXTROSE MONOHYDRATE 25 G: 25 INJECTION, SOLUTION INTRAVENOUS at 00:24

## 2018-01-01 RX ADMIN — ALLOPURINOL 100 MG: 100 TABLET ORAL at 09:01

## 2018-01-01 RX ADMIN — LIDOCAINE HYDROCHLORIDE 50 MG: 10 INJECTION, SOLUTION EPIDURAL; INFILTRATION; INTRACAUDAL; PERINEURAL at 12:39

## 2018-01-01 RX ADMIN — ANTACID TABLETS 500 MG: 500 TABLET, CHEWABLE ORAL at 23:11

## 2018-01-01 RX ADMIN — FOLIC ACID 1 MG: 1 TABLET ORAL at 14:32

## 2018-01-01 RX ADMIN — CIPROFLOXACIN 400 MG: 2 INJECTION, SOLUTION INTRAVENOUS at 12:25

## 2018-01-01 RX ADMIN — Medication 125 MG: at 17:44

## 2018-01-01 RX ADMIN — FLUCONAZOLE 100 MG: 100 TABLET ORAL at 09:01

## 2018-01-01 RX ADMIN — TRAMADOL HYDROCHLORIDE 50 MG: 50 TABLET, FILM COATED ORAL at 04:44

## 2018-01-01 RX ADMIN — Medication 10 ML: at 10:31

## 2018-01-01 RX ADMIN — Medication 1 CAPSULE: at 23:22

## 2018-01-01 RX ADMIN — ALLOPURINOL 100 MG: 100 TABLET ORAL at 14:32

## 2018-01-01 RX ADMIN — HEPARIN SODIUM 1900 UNITS: 1000 INJECTION, SOLUTION INTRAVENOUS; SUBCUTANEOUS at 18:10

## 2018-01-01 RX ADMIN — Medication 125 MG: at 13:08

## 2018-01-01 RX ADMIN — MORPHINE SULFATE 2 MG: 2 INJECTION, SOLUTION INTRAMUSCULAR; INTRAVENOUS at 07:12

## 2018-01-01 RX ADMIN — METRONIDAZOLE 500 MG: 500 TABLET ORAL at 13:42

## 2018-01-01 RX ADMIN — SUGAMMADEX 500 MG: 100 INJECTION, SOLUTION INTRAVENOUS at 14:24

## 2018-01-01 RX ADMIN — MIDODRINE HYDROCHLORIDE 5 MG: 5 TABLET ORAL at 12:04

## 2018-01-01 RX ADMIN — APIXABAN 5 MG: 5 TABLET, FILM COATED ORAL at 09:56

## 2018-01-01 RX ADMIN — Medication 125 MG: at 13:34

## 2018-01-01 RX ADMIN — CALCIUM GLUCONATE 1 G: 98 INJECTION, SOLUTION INTRAVENOUS at 00:13

## 2018-01-01 RX ADMIN — PHENYLEPHRINE HYDROCHLORIDE 200 MCG: 10 INJECTION INTRAVENOUS at 12:43

## 2018-01-01 RX ADMIN — Medication 125 MG: at 11:59

## 2018-01-01 RX ADMIN — ALBUMIN (HUMAN) 25 G: 0.25 INJECTION, SOLUTION INTRAVENOUS at 13:49

## 2018-01-01 RX ADMIN — Medication 125 MG: at 13:31

## 2018-01-01 RX ADMIN — HEPARIN SODIUM 1000 UNITS: 5000 INJECTION, SOLUTION INTRAVENOUS; SUBCUTANEOUS at 17:21

## 2018-01-01 RX ADMIN — ACETAMINOPHEN 650 MG: 325 TABLET ORAL at 11:05

## 2018-01-01 RX ADMIN — ALLOPURINOL 100 MG: 100 TABLET ORAL at 08:29

## 2018-01-01 RX ADMIN — ACETAMINOPHEN 650 MG: 325 TABLET ORAL at 05:30

## 2018-01-01 RX ADMIN — MIDODRINE HYDROCHLORIDE 5 MG: 5 TABLET ORAL at 17:12

## 2018-01-01 RX ADMIN — Medication 125 MG: at 18:10

## 2018-01-01 RX ADMIN — Medication 10 ML: at 20:48

## 2018-01-01 RX ADMIN — MIDODRINE HYDROCHLORIDE 5 MG: 5 TABLET ORAL at 12:59

## 2018-01-01 RX ADMIN — ACETAMINOPHEN 650 MG: 325 TABLET ORAL at 00:46

## 2018-01-01 RX ADMIN — Medication 10 ML: at 20:52

## 2018-01-01 RX ADMIN — Medication 125 MG: at 12:59

## 2018-01-01 RX ADMIN — LEVOTHYROXINE SODIUM 25 MCG: 25 TABLET ORAL at 08:13

## 2018-01-01 RX ADMIN — HEPARIN SODIUM 1900 UNITS: 1000 INJECTION, SOLUTION INTRAVENOUS; SUBCUTANEOUS at 19:17

## 2018-01-01 RX ADMIN — LIDOCAINE HYDROCHLORIDE 50 MG: 10 INJECTION, SOLUTION EPIDURAL; INFILTRATION; INTRACAUDAL; PERINEURAL at 12:13

## 2018-01-01 RX ADMIN — LIDOCAINE HYDROCHLORIDE,EPINEPHRINE BITARTRATE 10 ML: 20; .01 INJECTION, SOLUTION INFILTRATION; PERINEURAL at 14:28

## 2018-01-01 RX ADMIN — IOPAMIDOL 100 ML: 755 INJECTION, SOLUTION INTRAVENOUS at 12:14

## 2018-01-01 RX ADMIN — Medication 10 ML: at 09:57

## 2018-01-01 RX ADMIN — APIXABAN 5 MG: 5 TABLET, FILM COATED ORAL at 08:32

## 2018-01-01 RX ADMIN — FOLIC ACID 1 MG: 1 TABLET ORAL at 08:32

## 2018-01-01 RX ADMIN — SUCCINYLCHOLINE CHLORIDE 120 MG: 20 INJECTION, SOLUTION INTRAMUSCULAR; INTRAVENOUS at 12:13

## 2018-01-01 RX ADMIN — MICONAZOLE NITRATE: 20.6 POWDER TOPICAL at 19:48

## 2018-01-01 RX ADMIN — MICONAZOLE NITRATE: 20.6 POWDER TOPICAL at 06:04

## 2018-01-01 RX ADMIN — ACETAMINOPHEN 650 MG: 325 TABLET ORAL at 07:03

## 2018-01-01 RX ADMIN — Medication 10 ML: at 00:01

## 2018-01-01 RX ADMIN — TRAMADOL HYDROCHLORIDE 50 MG: 50 TABLET, FILM COATED ORAL at 21:39

## 2018-01-01 RX ADMIN — Medication 10 ML: at 21:36

## 2018-01-01 RX ADMIN — MIDODRINE HYDROCHLORIDE 5 MG: 5 TABLET ORAL at 08:43

## 2018-01-01 RX ADMIN — ALBUMIN (HUMAN) 25 G: 0.25 INJECTION, SOLUTION INTRAVENOUS at 11:12

## 2018-01-01 RX ADMIN — Medication 125 MG: at 11:27

## 2018-01-01 RX ADMIN — METOPROLOL TARTRATE 12.5 MG: 25 TABLET ORAL at 21:39

## 2018-01-01 RX ADMIN — PROPOFOL 150 MG: 10 INJECTION, EMULSION INTRAVENOUS at 12:39

## 2018-01-01 RX ADMIN — TRAMADOL HYDROCHLORIDE 50 MG: 50 TABLET, FILM COATED ORAL at 03:10

## 2018-01-01 RX ADMIN — MIDODRINE HYDROCHLORIDE 5 MG: 5 TABLET ORAL at 12:16

## 2018-01-01 RX ADMIN — Medication 125 MG: at 12:16

## 2018-01-01 RX ADMIN — Medication 125 MG: at 11:53

## 2018-01-01 RX ADMIN — FUROSEMIDE 40 MG: 10 INJECTION, SOLUTION INTRAMUSCULAR; INTRAVENOUS at 17:12

## 2018-01-01 RX ADMIN — FOLIC ACID 1 MG: 1 TABLET ORAL at 08:29

## 2018-01-01 RX ADMIN — MIDODRINE HYDROCHLORIDE 5 MG: 5 TABLET ORAL at 08:30

## 2018-01-01 RX ADMIN — SODIUM POLYSTYRENE SULFONATE 30 G: 15 SUSPENSION ORAL; RECTAL at 22:13

## 2018-01-01 RX ADMIN — ACETAMINOPHEN 650 MG: 325 TABLET ORAL at 16:43

## 2018-01-01 RX ADMIN — Medication 125 MG: at 23:36

## 2018-01-01 RX ADMIN — Medication 125 MG: at 00:34

## 2018-01-01 RX ADMIN — FUROSEMIDE 40 MG: 10 INJECTION, SOLUTION INTRAMUSCULAR; INTRAVENOUS at 13:45

## 2018-01-01 RX ADMIN — MIDODRINE HYDROCHLORIDE 5 MG: 5 TABLET ORAL at 17:31

## 2018-01-01 RX ADMIN — MIDODRINE HYDROCHLORIDE 5 MG: 5 TABLET ORAL at 18:51

## 2018-01-01 RX ADMIN — SODIUM CHLORIDE: 9 INJECTION, SOLUTION INTRAVENOUS at 12:30

## 2018-01-01 RX ADMIN — ACETAMINOPHEN 650 MG: 325 TABLET ORAL at 04:47

## 2018-01-01 RX ADMIN — Medication 1 CAPSULE: at 08:07

## 2018-01-01 RX ADMIN — Medication 125 MG: at 23:22

## 2018-01-01 RX ADMIN — DEXAMETHASONE SODIUM PHOSPHATE 10 MG: 10 INJECTION INTRAMUSCULAR; INTRAVENOUS at 12:24

## 2018-01-01 RX ADMIN — ONDANSETRON 4 MG: 2 INJECTION INTRAMUSCULAR; INTRAVENOUS at 15:28

## 2018-01-01 RX ADMIN — HEPARIN SODIUM 5000 UNITS: 5000 INJECTION, SOLUTION INTRAVENOUS; SUBCUTANEOUS at 22:41

## 2018-01-01 RX ADMIN — Medication 125 MG: at 18:37

## 2018-01-01 RX ADMIN — TRAMADOL HYDROCHLORIDE 50 MG: 50 TABLET, FILM COATED ORAL at 06:24

## 2018-01-01 RX ADMIN — ACETAMINOPHEN 650 MG: 325 TABLET ORAL at 16:30

## 2018-01-01 RX ADMIN — MORPHINE SULFATE 4 MG: 4 INJECTION, SOLUTION INTRAMUSCULAR; INTRAVENOUS at 16:45

## 2018-01-01 RX ADMIN — MIDODRINE HYDROCHLORIDE 5 MG: 5 TABLET ORAL at 11:07

## 2018-01-01 RX ADMIN — FENTANYL CITRATE 50 MCG: 50 INJECTION, SOLUTION INTRAMUSCULAR; INTRAVENOUS at 13:35

## 2018-01-01 RX ADMIN — Medication 125 MG: at 17:51

## 2018-01-01 RX ADMIN — HEPARIN SODIUM 5000 UNITS: 5000 INJECTION, SOLUTION INTRAVENOUS; SUBCUTANEOUS at 21:50

## 2018-01-01 RX ADMIN — Medication 10 ML: at 10:59

## 2018-01-01 RX ADMIN — TRAMADOL HYDROCHLORIDE 50 MG: 50 TABLET, FILM COATED ORAL at 10:54

## 2018-01-01 RX ADMIN — ALBUMIN (HUMAN) 25 G: 0.25 INJECTION, SOLUTION INTRAVENOUS at 06:10

## 2018-01-01 RX ADMIN — METRONIDAZOLE 500 MG: 500 TABLET ORAL at 06:00

## 2018-01-01 RX ADMIN — Medication 125 MG: at 18:07

## 2018-01-01 RX ADMIN — MEROPENEM 1 G: 1 INJECTION, POWDER, FOR SOLUTION INTRAVENOUS at 00:35

## 2018-01-01 RX ADMIN — PHENYLEPHRINE HYDROCHLORIDE 120 MCG: 10 INJECTION INTRAVENOUS at 13:09

## 2018-01-01 RX ADMIN — Medication 10 ML: at 22:10

## 2018-01-01 RX ADMIN — APIXABAN 5 MG: 5 TABLET, FILM COATED ORAL at 21:54

## 2018-01-01 RX ADMIN — Medication 125 MG: at 00:00

## 2018-01-01 RX ADMIN — FUROSEMIDE 20 MG: 20 TABLET ORAL at 22:42

## 2018-01-01 RX ADMIN — MIDODRINE HYDROCHLORIDE 5 MG: 5 TABLET ORAL at 09:16

## 2018-01-01 RX ADMIN — MIDODRINE HYDROCHLORIDE 5 MG: 5 TABLET ORAL at 12:47

## 2018-01-01 RX ADMIN — Medication 125 MG: at 00:46

## 2018-01-01 RX ADMIN — ACETAMINOPHEN 650 MG: 325 TABLET ORAL at 00:56

## 2018-01-01 RX ADMIN — APIXABAN 5 MG: 5 TABLET, FILM COATED ORAL at 20:03

## 2018-01-01 RX ADMIN — ONDANSETRON 4 MG: 2 INJECTION, SOLUTION INTRAMUSCULAR; INTRAVENOUS at 11:33

## 2018-01-01 RX ADMIN — MORPHINE SULFATE 4 MG: 4 INJECTION, SOLUTION INTRAMUSCULAR; INTRAVENOUS at 12:45

## 2018-01-01 RX ADMIN — WATER 60 ML: 100 IRRIGANT IRRIGATION at 19:32

## 2018-01-01 RX ADMIN — FAMOTIDINE 20 MG: 10 INJECTION, SOLUTION INTRAVENOUS at 07:51

## 2018-01-01 RX ADMIN — Medication 125 MG: at 17:11

## 2018-01-01 RX ADMIN — Medication 125 MG: at 06:23

## 2018-01-01 RX ADMIN — ACETAMINOPHEN 650 MG: 325 TABLET ORAL at 22:30

## 2018-01-01 RX ADMIN — MIDODRINE HYDROCHLORIDE 5 MG: 5 TABLET ORAL at 08:32

## 2018-01-01 RX ADMIN — Medication 10 ML: at 00:00

## 2018-01-01 RX ADMIN — FUROSEMIDE 40 MG: 40 TABLET ORAL at 16:32

## 2018-01-01 RX ADMIN — Medication 125 MG: at 23:33

## 2018-01-01 RX ADMIN — FENTANYL CITRATE 25 MCG: 50 INJECTION INTRAMUSCULAR; INTRAVENOUS at 12:50

## 2018-01-01 RX ADMIN — Medication 150 MEQ: at 12:25

## 2018-01-01 RX ADMIN — Medication 125 MG: at 05:39

## 2018-01-01 RX ADMIN — Medication 125 MG: at 12:26

## 2018-01-01 RX ADMIN — PHENYLEPHRINE HYDROCHLORIDE 200 MCG: 10 INJECTION INTRAVENOUS at 12:23

## 2018-01-01 RX ADMIN — APIXABAN 5 MG: 5 TABLET, FILM COATED ORAL at 20:47

## 2018-01-01 RX ADMIN — MIDODRINE HYDROCHLORIDE 5 MG: 5 TABLET ORAL at 10:53

## 2018-01-01 RX ADMIN — ONDANSETRON 4 MG: 2 INJECTION INTRAMUSCULAR; INTRAVENOUS at 12:12

## 2018-01-01 RX ADMIN — GLYCOPYRROLATE 0.1 MG: 0.2 INJECTION, SOLUTION INTRAMUSCULAR; INTRAVENOUS at 12:36

## 2018-01-01 RX ADMIN — Medication 125 MG: at 05:46

## 2018-01-01 RX ADMIN — MIDODRINE HYDROCHLORIDE 5 MG: 5 TABLET ORAL at 14:31

## 2018-01-01 RX ADMIN — MIDODRINE HYDROCHLORIDE 5 MG: 5 TABLET ORAL at 17:30

## 2018-01-01 RX ADMIN — Medication 125 MG: at 01:10

## 2018-01-01 RX ADMIN — FENTANYL CITRATE 50 MCG: 50 INJECTION INTRAMUSCULAR; INTRAVENOUS at 11:32

## 2018-01-01 RX ADMIN — ALBUMIN (HUMAN) 50 G: 0.25 INJECTION, SOLUTION INTRAVENOUS at 11:08

## 2018-01-01 RX ADMIN — ACETAMINOPHEN 650 MG: 325 TABLET ORAL at 03:13

## 2018-01-01 RX ADMIN — FLUDROCORTISONE ACETATE 0.1 MG: 0.1 TABLET ORAL at 13:34

## 2018-01-01 RX ADMIN — Medication 125 MG: at 23:23

## 2018-01-01 RX ADMIN — APIXABAN 5 MG: 5 TABLET, FILM COATED ORAL at 07:47

## 2018-01-01 RX ADMIN — ONDANSETRON 4 MG: 2 INJECTION INTRAMUSCULAR; INTRAVENOUS at 12:42

## 2018-01-01 RX ADMIN — DEXAMETHASONE SODIUM PHOSPHATE 10 MG: 10 INJECTION INTRAMUSCULAR; INTRAVENOUS at 13:02

## 2018-01-01 RX ADMIN — HEPARIN SODIUM AND DEXTROSE 18 UNITS/KG/HR: 10000; 5 INJECTION INTRAVENOUS at 23:19

## 2018-01-01 RX ADMIN — ALBUMIN (HUMAN) 25 G: 0.25 INJECTION, SOLUTION INTRAVENOUS at 07:51

## 2018-01-01 RX ADMIN — ENOXAPARIN SODIUM 160 MG: 100 INJECTION SUBCUTANEOUS at 08:18

## 2018-01-01 RX ADMIN — Medication 10 ML: at 19:01

## 2018-01-01 RX ADMIN — Medication 150 MEQ: at 06:27

## 2018-01-01 RX ADMIN — SODIUM CHLORIDE 1000 ML: 9 INJECTION, SOLUTION INTRAVENOUS at 15:58

## 2018-01-01 RX ADMIN — WATER 1 G: 1 INJECTION INTRAMUSCULAR; INTRAVENOUS; SUBCUTANEOUS at 14:05

## 2018-01-01 RX ADMIN — MICONAZOLE NITRATE: 20.6 POWDER TOPICAL at 06:21

## 2018-01-01 RX ADMIN — Medication 125 MG: at 06:27

## 2018-01-01 RX ADMIN — LEVOTHYROXINE SODIUM 50 MCG: 50 TABLET ORAL at 05:25

## 2018-01-01 RX ADMIN — FUROSEMIDE 40 MG: 10 INJECTION, SOLUTION INTRAMUSCULAR; INTRAVENOUS at 16:31

## 2018-01-01 RX ADMIN — ANTACID TABLETS 500 MG: 500 TABLET, CHEWABLE ORAL at 06:15

## 2018-01-01 RX ADMIN — METRONIDAZOLE 500 MG: 500 TABLET ORAL at 23:22

## 2018-01-01 RX ADMIN — MIDODRINE HYDROCHLORIDE 5 MG: 5 TABLET ORAL at 09:01

## 2018-01-01 RX ADMIN — MEROPENEM 1 G: 1 INJECTION, POWDER, FOR SOLUTION INTRAVENOUS at 17:11

## 2018-01-01 RX ADMIN — Medication 125 MG: at 18:41

## 2018-01-01 RX ADMIN — ACETAMINOPHEN 650 MG: 325 TABLET ORAL at 20:51

## 2018-01-01 RX ADMIN — Medication 10 ML: at 10:09

## 2018-01-01 RX ADMIN — ACETAMINOPHEN 650 MG: 325 TABLET ORAL at 14:03

## 2018-01-01 RX ADMIN — APIXABAN 5 MG: 5 TABLET, FILM COATED ORAL at 21:27

## 2018-01-01 RX ADMIN — APIXABAN 5 MG: 5 TABLET, FILM COATED ORAL at 21:24

## 2018-01-01 RX ADMIN — APIXABAN 5 MG: 5 TABLET, FILM COATED ORAL at 11:26

## 2018-01-01 RX ADMIN — Medication 10 ML: at 08:23

## 2018-01-01 RX ADMIN — MIDODRINE HYDROCHLORIDE 5 MG: 5 TABLET ORAL at 22:00

## 2018-01-01 RX ADMIN — MICONAZOLE NITRATE: 20.6 POWDER TOPICAL at 06:01

## 2018-01-01 RX ADMIN — LEVOTHYROXINE SODIUM 25 MCG: 25 TABLET ORAL at 06:41

## 2018-01-01 RX ADMIN — LEVOTHYROXINE SODIUM 50 MCG: 50 TABLET ORAL at 05:46

## 2018-01-01 RX ADMIN — Medication 125 MG: at 18:42

## 2018-01-01 RX ADMIN — MIDODRINE HYDROCHLORIDE 5 MG: 5 TABLET ORAL at 16:50

## 2018-01-01 RX ADMIN — INSULIN HUMAN 10 UNITS: 100 INJECTION, SOLUTION PARENTERAL at 18:26

## 2018-01-01 RX ADMIN — ALBUMIN (HUMAN) 50 G: 0.25 INJECTION, SOLUTION INTRAVENOUS at 15:46

## 2018-01-01 RX ADMIN — ENOXAPARIN SODIUM 160 MG: 100 INJECTION SUBCUTANEOUS at 20:52

## 2018-01-01 RX ADMIN — MIDODRINE HYDROCHLORIDE 5 MG: 5 TABLET ORAL at 17:44

## 2018-01-01 RX ADMIN — HEPARIN SODIUM 1000 UNITS: 5000 INJECTION, SOLUTION INTRAVENOUS; SUBCUTANEOUS at 17:24

## 2018-01-01 RX ADMIN — FENTANYL CITRATE 25 MCG: 50 INJECTION, SOLUTION INTRAMUSCULAR; INTRAVENOUS at 19:01

## 2018-01-01 RX ADMIN — HEPARIN 5 ML: 100 SYRINGE at 14:43

## 2018-01-01 RX ADMIN — FENTANYL CITRATE 25 MCG: 50 INJECTION, SOLUTION INTRAMUSCULAR; INTRAVENOUS at 22:02

## 2018-01-01 RX ADMIN — ALLOPURINOL 100 MG: 100 TABLET ORAL at 12:04

## 2018-01-01 RX ADMIN — APIXABAN 5 MG: 5 TABLET, FILM COATED ORAL at 21:35

## 2018-01-01 RX ADMIN — MIDODRINE HYDROCHLORIDE 5 MG: 5 TABLET ORAL at 13:31

## 2018-01-01 RX ADMIN — INFLUENZA VIRUS VACCINE 0.5 ML: 15; 15; 15; 15 SUSPENSION INTRAMUSCULAR at 13:50

## 2018-01-01 RX ADMIN — FLUDROCORTISONE ACETATE 0.2 MG: 0.1 TABLET ORAL at 00:13

## 2018-01-01 RX ADMIN — ACETAMINOPHEN 650 MG: 325 TABLET ORAL at 20:12

## 2018-01-01 RX ADMIN — MEROPENEM 1 G: 1 INJECTION, POWDER, FOR SOLUTION INTRAVENOUS at 18:29

## 2018-01-01 RX ADMIN — DOCUSATE SODIUM 100 MG: 100 CAPSULE, LIQUID FILLED ORAL at 07:52

## 2018-01-01 RX ADMIN — MIDODRINE HYDROCHLORIDE 5 MG: 5 TABLET ORAL at 16:43

## 2018-01-01 RX ADMIN — TRAMADOL HYDROCHLORIDE 50 MG: 50 TABLET, FILM COATED ORAL at 19:46

## 2018-01-01 RX ADMIN — ALBUMIN (HUMAN) 25 G: 0.25 INJECTION, SOLUTION INTRAVENOUS at 14:16

## 2018-01-01 RX ADMIN — LEVOTHYROXINE SODIUM 50 MCG: 50 TABLET ORAL at 06:27

## 2018-01-01 RX ADMIN — SODIUM CHLORIDE: 9 INJECTION, SOLUTION INTRAVENOUS at 04:48

## 2018-01-01 RX ADMIN — FOLIC ACID 1 MG: 1 TABLET ORAL at 09:15

## 2018-01-01 RX ADMIN — ALLOPURINOL 100 MG: 100 TABLET ORAL at 09:15

## 2018-01-01 RX ADMIN — SODIUM CHLORIDE: 9 INJECTION, SOLUTION INTRAVENOUS at 00:04

## 2018-01-01 RX ADMIN — Medication 125 MG: at 17:57

## 2018-01-01 RX ADMIN — ENOXAPARIN SODIUM 160 MG: 100 INJECTION SUBCUTANEOUS at 09:31

## 2018-01-01 RX ADMIN — LEVOTHYROXINE SODIUM 50 MCG: 50 TABLET ORAL at 06:15

## 2018-01-01 RX ADMIN — ALBUMIN (HUMAN) 12.5 G: 0.25 INJECTION, SOLUTION INTRAVENOUS at 11:36

## 2018-01-01 RX ADMIN — FUROSEMIDE 40 MG: 40 TABLET ORAL at 08:28

## 2018-01-01 RX ADMIN — FOLIC ACID 1 MG: 1 TABLET ORAL at 08:43

## 2018-01-01 RX ADMIN — APIXABAN 5 MG: 5 TABLET, FILM COATED ORAL at 20:15

## 2018-01-01 RX ADMIN — MIDODRINE HYDROCHLORIDE 5 MG: 5 TABLET ORAL at 11:51

## 2018-01-01 RX ADMIN — SODIUM CHLORIDE 500 ML: 9 INJECTION, SOLUTION INTRAVENOUS at 08:10

## 2018-01-01 RX ADMIN — ALLOPURINOL 100 MG: 100 TABLET ORAL at 08:43

## 2018-01-01 RX ADMIN — DEXTROSE MONOHYDRATE 25 G: 500 INJECTION PARENTERAL at 15:07

## 2018-01-01 RX ADMIN — Medication 150 MEQ: at 18:10

## 2018-01-01 RX ADMIN — Medication 10 ML: at 09:37

## 2018-01-01 RX ADMIN — Medication 125 MG: at 17:10

## 2018-01-01 RX ADMIN — POTASSIUM CHLORIDE 20 MEQ: 20 TABLET, EXTENDED RELEASE ORAL at 09:23

## 2018-01-01 RX ADMIN — TRAMADOL HYDROCHLORIDE 50 MG: 50 TABLET, FILM COATED ORAL at 04:09

## 2018-01-01 RX ADMIN — ERGOCALCIFEROL 50000 UNITS: 1.25 CAPSULE ORAL at 08:29

## 2018-01-01 RX ADMIN — Medication 125 MG: at 06:15

## 2018-01-01 RX ADMIN — Medication 10 ML: at 22:03

## 2018-01-01 RX ADMIN — ACETAMINOPHEN 650 MG: 325 TABLET ORAL at 11:53

## 2018-01-01 RX ADMIN — SODIUM CHLORIDE 500 ML: 9 INJECTION, SOLUTION INTRAVENOUS at 12:52

## 2018-01-01 RX ADMIN — METRONIDAZOLE 500 MG: 500 TABLET ORAL at 06:21

## 2018-01-01 RX ADMIN — ACETAMINOPHEN 650 MG: 325 TABLET, FILM COATED ORAL at 05:46

## 2018-01-01 RX ADMIN — POTASSIUM CHLORIDE 20 MEQ: 20 TABLET, EXTENDED RELEASE ORAL at 08:20

## 2018-01-01 RX ADMIN — APIXABAN 5 MG: 5 TABLET, FILM COATED ORAL at 10:54

## 2018-01-01 RX ADMIN — FLUCONAZOLE 200 MG: 200 TABLET ORAL at 08:43

## 2018-01-01 RX ADMIN — LEVOTHYROXINE SODIUM 50 MCG: 50 TABLET ORAL at 05:33

## 2018-01-01 RX ADMIN — Medication 125 MG: at 05:38

## 2018-01-01 RX ADMIN — METRONIDAZOLE 500 MG: 500 TABLET ORAL at 22:02

## 2018-01-01 RX ADMIN — ENOXAPARIN SODIUM 160 MG: 100 INJECTION SUBCUTANEOUS at 19:36

## 2018-01-01 RX ADMIN — SODIUM CHLORIDE: 9 INJECTION, SOLUTION INTRAVENOUS at 18:02

## 2018-01-01 RX ADMIN — CIPROFLOXACIN 400 MG: 2 INJECTION, SOLUTION INTRAVENOUS at 12:58

## 2018-01-01 RX ADMIN — TRAMADOL HYDROCHLORIDE 50 MG: 50 TABLET, FILM COATED ORAL at 17:14

## 2018-01-01 RX ADMIN — Medication 125 MG: at 00:06

## 2018-01-01 RX ADMIN — MICONAZOLE NITRATE: 20.6 POWDER TOPICAL at 08:21

## 2018-01-01 RX ADMIN — LEVOTHYROXINE SODIUM 25 MCG: 25 TABLET ORAL at 05:37

## 2018-01-01 RX ADMIN — INSULIN HUMAN 10 UNITS: 100 INJECTION, SOLUTION PARENTERAL at 00:36

## 2018-01-01 RX ADMIN — HEPARIN SODIUM 1900 UNITS: 1000 INJECTION, SOLUTION INTRAVENOUS; SUBCUTANEOUS at 18:09

## 2018-01-01 RX ADMIN — METOPROLOL TARTRATE 12.5 MG: 25 TABLET ORAL at 10:21

## 2018-01-01 RX ADMIN — FLUCONAZOLE 200 MG: 200 TABLET ORAL at 08:29

## 2018-01-01 RX ADMIN — POTASSIUM CHLORIDE 20 MEQ: 20 TABLET, EXTENDED RELEASE ORAL at 08:03

## 2018-01-01 RX ADMIN — MEROPENEM 1 G: 1 INJECTION, POWDER, FOR SOLUTION INTRAVENOUS at 23:59

## 2018-01-01 RX ADMIN — Medication 125 MG: at 18:24

## 2018-01-01 RX ADMIN — APIXABAN 5 MG: 5 TABLET, FILM COATED ORAL at 23:23

## 2018-01-01 RX ADMIN — FENTANYL CITRATE 25 MCG: 50 INJECTION INTRAMUSCULAR; INTRAVENOUS at 12:39

## 2018-01-01 RX ADMIN — ACETAMINOPHEN 650 MG: 325 TABLET ORAL at 20:41

## 2018-01-01 RX ADMIN — ANTACID TABLETS 500 MG: 500 TABLET, CHEWABLE ORAL at 20:48

## 2018-01-01 RX ADMIN — DEXTROSE MONOHYDRATE 25 G: 25 INJECTION, SOLUTION INTRAVENOUS at 10:55

## 2018-01-01 RX ADMIN — CEFAZOLIN 3000 MG: 330 INJECTION, POWDER, FOR SOLUTION INTRAMUSCULAR; INTRAVENOUS at 13:56

## 2018-01-01 RX ADMIN — PHENYLEPHRINE HYDROCHLORIDE 160 MCG: 10 INJECTION INTRAVENOUS at 12:43

## 2018-01-01 RX ADMIN — Medication 10 ML: at 09:31

## 2018-01-01 RX ADMIN — DEXTROSE MONOHYDRATE 25 G: 25 INJECTION, SOLUTION INTRAVENOUS at 18:27

## 2018-01-01 RX ADMIN — MORPHINE SULFATE 2 MG: 2 INJECTION, SOLUTION INTRAMUSCULAR; INTRAVENOUS at 23:51

## 2018-01-01 RX ADMIN — APIXABAN 5 MG: 5 TABLET, FILM COATED ORAL at 20:41

## 2018-01-01 RX ADMIN — HEPARIN SODIUM 1900 UNITS: 1000 INJECTION, SOLUTION INTRAVENOUS; SUBCUTANEOUS at 20:05

## 2018-01-01 RX ADMIN — MIDODRINE HYDROCHLORIDE 5 MG: 5 TABLET ORAL at 18:30

## 2018-01-01 RX ADMIN — TRAMADOL HYDROCHLORIDE 50 MG: 50 TABLET, FILM COATED ORAL at 21:54

## 2018-01-01 RX ADMIN — SODIUM CHLORIDE 500 ML: 9 INJECTION, SOLUTION INTRAVENOUS at 18:02

## 2018-01-01 RX ADMIN — FUROSEMIDE 20 MG: 20 TABLET ORAL at 07:51

## 2018-01-01 RX ADMIN — Medication 10 ML: at 22:40

## 2018-01-01 RX ADMIN — ONDANSETRON 4 MG: 2 INJECTION INTRAMUSCULAR; INTRAVENOUS at 07:39

## 2018-01-01 RX ADMIN — ALBUMIN (HUMAN) 25 G: 0.25 INJECTION, SOLUTION INTRAVENOUS at 21:28

## 2018-01-01 RX ADMIN — SODIUM CHLORIDE, POTASSIUM CHLORIDE, SODIUM LACTATE AND CALCIUM CHLORIDE: 600; 310; 30; 20 INJECTION, SOLUTION INTRAVENOUS at 11:46

## 2018-01-01 RX ADMIN — Medication 125 MG: at 05:45

## 2018-01-01 RX ADMIN — INSULIN HUMAN 10 UNITS: 100 INJECTION, SOLUTION PARENTERAL at 13:29

## 2018-01-01 RX ADMIN — SODIUM CHLORIDE: 9 INJECTION, SOLUTION INTRAVENOUS at 13:00

## 2018-01-01 RX ADMIN — TRAMADOL HYDROCHLORIDE 50 MG: 50 TABLET, FILM COATED ORAL at 15:55

## 2018-01-01 RX ADMIN — MEROPENEM 1 G: 1 INJECTION, POWDER, FOR SOLUTION INTRAVENOUS at 08:59

## 2018-01-01 RX ADMIN — DEXTROSE MONOHYDRATE 25 G: 500 INJECTION PARENTERAL at 13:28

## 2018-01-01 RX ADMIN — MIDODRINE HYDROCHLORIDE 5 MG: 5 TABLET ORAL at 11:59

## 2018-01-01 RX ADMIN — MEROPENEM 1 G: 1 INJECTION, POWDER, FOR SOLUTION INTRAVENOUS at 09:01

## 2018-01-01 RX ADMIN — TRAMADOL HYDROCHLORIDE 50 MG: 50 TABLET, FILM COATED ORAL at 10:02

## 2018-01-01 RX ADMIN — CIPROFLOXACIN 400 MG: 2 INJECTION, SOLUTION INTRAVENOUS at 13:04

## 2018-01-01 RX ADMIN — TRAMADOL HYDROCHLORIDE 50 MG: 50 TABLET, FILM COATED ORAL at 00:50

## 2018-01-01 RX ADMIN — Medication 125 MG: at 05:59

## 2018-01-01 RX ADMIN — LEVOTHYROXINE SODIUM 50 MCG: 50 TABLET ORAL at 06:30

## 2018-01-01 RX ADMIN — FAMOTIDINE 20 MG: 10 INJECTION, SOLUTION INTRAVENOUS at 08:22

## 2018-01-01 RX ADMIN — PHENYLEPHRINE HYDROCHLORIDE 200 MCG: 10 INJECTION INTRAVENOUS at 12:38

## 2018-01-01 RX ADMIN — APIXABAN 5 MG: 5 TABLET, FILM COATED ORAL at 21:36

## 2018-01-01 RX ADMIN — ACETAMINOPHEN 650 MG: 325 TABLET ORAL at 20:15

## 2018-01-01 RX ADMIN — ONDANSETRON 4 MG: 2 INJECTION INTRAMUSCULAR; INTRAVENOUS at 04:44

## 2018-01-01 RX ADMIN — SODIUM CHLORIDE 100 ML: 9 INJECTION, SOLUTION INTRAVENOUS at 12:14

## 2018-01-01 RX ADMIN — APIXABAN 5 MG: 5 TABLET, FILM COATED ORAL at 08:29

## 2018-01-01 RX ADMIN — SODIUM CHLORIDE: 9 INJECTION, SOLUTION INTRAVENOUS at 20:59

## 2018-01-01 RX ADMIN — ONDANSETRON 4 MG: 2 INJECTION INTRAMUSCULAR; INTRAVENOUS at 00:24

## 2018-01-01 RX ADMIN — ACETAMINOPHEN 650 MG: 325 TABLET ORAL at 10:08

## 2018-01-01 RX ADMIN — APIXABAN 5 MG: 5 TABLET, FILM COATED ORAL at 14:32

## 2018-01-01 RX ADMIN — ACETAMINOPHEN 650 MG: 325 TABLET ORAL at 13:00

## 2018-01-01 RX ADMIN — Medication 125 MG: at 06:34

## 2018-01-01 RX ADMIN — LEVOTHYROXINE SODIUM 50 MCG: 50 TABLET ORAL at 05:39

## 2018-01-01 RX ADMIN — MIDODRINE HYDROCHLORIDE 5 MG: 5 TABLET ORAL at 08:28

## 2018-01-01 RX ADMIN — MIDODRINE HYDROCHLORIDE 5 MG: 5 TABLET ORAL at 09:30

## 2018-01-01 RX ADMIN — ROCURONIUM BROMIDE 25 MG: 10 INJECTION INTRAVENOUS at 13:08

## 2018-01-01 RX ADMIN — ALLOPURINOL 100 MG: 100 TABLET ORAL at 08:32

## 2018-01-01 RX ADMIN — LEVOTHYROXINE SODIUM 50 MCG: 50 TABLET ORAL at 05:59

## 2018-01-01 RX ADMIN — Medication 10 ML: at 21:27

## 2018-01-01 RX ADMIN — APIXABAN 5 MG: 5 TABLET, FILM COATED ORAL at 09:16

## 2018-01-01 RX ADMIN — DICYCLOMINE HYDROCHLORIDE 10 MG: 10 CAPSULE ORAL at 15:46

## 2018-01-01 RX ADMIN — METOPROLOL TARTRATE 5 MG: 5 INJECTION INTRAVENOUS at 03:47

## 2018-01-01 RX ADMIN — FAMOTIDINE 20 MG: 10 INJECTION, SOLUTION INTRAVENOUS at 22:40

## 2018-01-01 RX ADMIN — Medication 10 ML: at 08:11

## 2018-01-01 RX ADMIN — APIXABAN 5 MG: 5 TABLET, FILM COATED ORAL at 10:08

## 2018-01-01 RX ADMIN — SODIUM CHLORIDE: 9 INJECTION, SOLUTION INTRAVENOUS at 12:16

## 2018-01-01 RX ADMIN — INSULIN HUMAN 10 UNITS: 100 INJECTION, SOLUTION PARENTERAL at 10:54

## 2018-01-01 RX ADMIN — Medication 10 ML: at 20:04

## 2018-01-01 RX ADMIN — OXYCODONE HYDROCHLORIDE 5 MG: 5 TABLET ORAL at 15:53

## 2018-01-01 RX ADMIN — FUROSEMIDE 40 MG: 40 TABLET ORAL at 10:08

## 2018-01-01 RX ADMIN — APIXABAN 5 MG: 5 TABLET, FILM COATED ORAL at 00:00

## 2018-01-01 RX ADMIN — FLUCONAZOLE 200 MG: 200 TABLET ORAL at 14:32

## 2018-01-01 RX ADMIN — Medication 10 ML: at 22:01

## 2018-01-01 RX ADMIN — LIDOCAINE HYDROCHLORIDE 3 ML: 20 INJECTION, SOLUTION EPIDURAL; INFILTRATION; INTRACAUDAL; PERINEURAL at 14:27

## 2018-01-01 RX ADMIN — Medication 125 MG: at 00:18

## 2018-01-01 RX ADMIN — Medication 10 ML: at 08:03

## 2018-01-01 RX ADMIN — ONDANSETRON 4 MG: 2 INJECTION, SOLUTION INTRAMUSCULAR; INTRAVENOUS at 17:03

## 2018-01-01 RX ADMIN — CEFTRIAXONE SODIUM 1 G: 1 INJECTION, POWDER, FOR SOLUTION INTRAMUSCULAR; INTRAVENOUS at 16:42

## 2018-01-01 RX ADMIN — FENTANYL CITRATE 50 MCG: 50 INJECTION, SOLUTION INTRAMUSCULAR; INTRAVENOUS at 17:03

## 2018-01-01 RX ADMIN — ROCURONIUM BROMIDE 50 MG: 10 INJECTION INTRAVENOUS at 12:39

## 2018-01-01 RX ADMIN — ANTACID TABLETS 500 MG: 500 TABLET, CHEWABLE ORAL at 17:41

## 2018-01-01 RX ADMIN — APIXABAN 5 MG: 5 TABLET, FILM COATED ORAL at 20:51

## 2018-01-01 RX ADMIN — FLUCONAZOLE 200 MG: 200 TABLET ORAL at 09:15

## 2018-01-01 RX ADMIN — ALBUMIN (HUMAN) 25 G: 0.25 INJECTION, SOLUTION INTRAVENOUS at 17:11

## 2018-01-01 RX ADMIN — ACETAMINOPHEN 650 MG: 325 TABLET ORAL at 22:26

## 2018-01-01 RX ADMIN — LEVOTHYROXINE SODIUM 50 MCG: 50 TABLET ORAL at 05:28

## 2018-01-01 RX ADMIN — ERGOCALCIFEROL 50000 UNITS: 1.25 CAPSULE ORAL at 15:33

## 2018-01-01 RX ADMIN — ENOXAPARIN SODIUM 160 MG: 100 INJECTION SUBCUTANEOUS at 20:40

## 2018-01-01 RX ADMIN — ACETAMINOPHEN 650 MG: 325 TABLET ORAL at 08:28

## 2018-01-01 RX ADMIN — ACETAMINOPHEN 650 MG: 325 TABLET ORAL at 23:47

## 2018-01-01 RX ADMIN — Medication 125 MG: at 12:51

## 2018-01-01 RX ADMIN — FUROSEMIDE 40 MG: 10 INJECTION, SOLUTION INTRAMUSCULAR; INTRAVENOUS at 16:43

## 2018-01-01 RX ADMIN — Medication 125 MG: at 05:34

## 2018-01-01 RX ADMIN — Medication 125 MG: at 23:35

## 2018-01-01 RX ADMIN — Medication 10 ML: at 11:05

## 2018-01-01 RX ADMIN — PHENYLEPHRINE HYDROCHLORIDE 200 MCG: 10 INJECTION INTRAVENOUS at 12:32

## 2018-01-01 RX ADMIN — Medication 125 MG: at 00:16

## 2018-01-01 RX ADMIN — INSULIN HUMAN 10 UNITS: 100 INJECTION, SOLUTION PARENTERAL at 15:06

## 2018-01-01 RX ADMIN — PHENYLEPHRINE HYDROCHLORIDE 120 MCG: 10 INJECTION INTRAVENOUS at 14:04

## 2018-01-01 RX ADMIN — SODIUM CHLORIDE 1000 ML: 9 INJECTION, SOLUTION INTRAVENOUS at 22:45

## 2018-01-01 RX ADMIN — SODIUM CHLORIDE 1000 ML: 9 INJECTION, SOLUTION INTRAVENOUS at 12:42

## 2018-01-01 RX ADMIN — SODIUM CHLORIDE: 9 INJECTION, SOLUTION INTRAVENOUS at 02:41

## 2018-01-01 RX ADMIN — Medication 10 ML: at 09:11

## 2018-01-01 RX ADMIN — MEROPENEM 1 G: 1 INJECTION, POWDER, FOR SOLUTION INTRAVENOUS at 10:53

## 2018-01-01 RX ADMIN — FUROSEMIDE 20 MG: 20 TABLET ORAL at 08:22

## 2018-01-01 RX ADMIN — APIXABAN 5 MG: 5 TABLET, FILM COATED ORAL at 08:28

## 2018-01-01 RX ADMIN — Medication 125 MG: at 11:07

## 2018-01-01 RX ADMIN — TRAMADOL HYDROCHLORIDE 50 MG: 50 TABLET, FILM COATED ORAL at 19:56

## 2018-01-01 RX ADMIN — ANTACID TABLETS 500 MG: 500 TABLET, CHEWABLE ORAL at 15:33

## 2018-01-01 RX ADMIN — PROPOFOL 40 MCG/KG/MIN: 10 INJECTION, EMULSION INTRAVENOUS at 13:37

## 2018-01-01 RX ADMIN — Medication 125 MG: at 17:56

## 2018-01-01 RX ADMIN — ACETAMINOPHEN 650 MG: 325 TABLET ORAL at 10:29

## 2018-01-01 RX ADMIN — CEFTRIAXONE SODIUM 1 G: 1 INJECTION, POWDER, FOR SOLUTION INTRAMUSCULAR; INTRAVENOUS at 16:31

## 2018-01-01 RX ADMIN — Medication 10 ML: at 08:30

## 2018-01-01 RX ADMIN — SODIUM CHLORIDE 500 ML: 9 INJECTION, SOLUTION INTRAVENOUS at 13:00

## 2018-01-01 RX ADMIN — Medication 10 ML: at 20:13

## 2018-01-01 ASSESSMENT — PULMONARY FUNCTION TESTS
PIF_VALUE: 28
PIF_VALUE: 0
PIF_VALUE: 21
PIF_VALUE: 2
PIF_VALUE: 0
PIF_VALUE: 29
PIF_VALUE: 0
PIF_VALUE: 29
PIF_VALUE: 0
PIF_VALUE: 1
PIF_VALUE: 30
PIF_VALUE: 0
PIF_VALUE: 0
PIF_VALUE: 29
PIF_VALUE: 29
PIF_VALUE: 1
PIF_VALUE: 0
PIF_VALUE: 23
PIF_VALUE: 23
PIF_VALUE: 29
PIF_VALUE: 29
PIF_VALUE: 1
PIF_VALUE: 29
PIF_VALUE: 0
PIF_VALUE: 22
PIF_VALUE: 30
PIF_VALUE: 0
PIF_VALUE: 22
PIF_VALUE: 29
PIF_VALUE: 26
PIF_VALUE: 29
PIF_VALUE: 30
PIF_VALUE: 0
PIF_VALUE: 0
PIF_VALUE: 29
PIF_VALUE: 29
PIF_VALUE: 21
PIF_VALUE: 30
PIF_VALUE: 23
PIF_VALUE: 31
PIF_VALUE: 0
PIF_VALUE: 0
PIF_VALUE: 27
PIF_VALUE: 0
PIF_VALUE: 29
PIF_VALUE: 28
PIF_VALUE: 29
PIF_VALUE: 25
PIF_VALUE: 1
PIF_VALUE: 25
PIF_VALUE: 0
PIF_VALUE: 29
PIF_VALUE: 29
PIF_VALUE: 0
PIF_VALUE: 29
PIF_VALUE: 0
PIF_VALUE: 19
PIF_VALUE: 27
PIF_VALUE: 0
PIF_VALUE: 29
PIF_VALUE: 29
PIF_VALUE: 0
PIF_VALUE: 30
PIF_VALUE: 24
PIF_VALUE: 0
PIF_VALUE: 2
PIF_VALUE: 7
PIF_VALUE: 0
PIF_VALUE: 0
PIF_VALUE: 23
PIF_VALUE: 29
PIF_VALUE: 29
PIF_VALUE: 0
PIF_VALUE: 29
PIF_VALUE: 23
PIF_VALUE: 12
PIF_VALUE: 0
PIF_VALUE: 1
PIF_VALUE: 29
PIF_VALUE: 31
PIF_VALUE: 0
PIF_VALUE: 21
PIF_VALUE: 20
PIF_VALUE: 29
PIF_VALUE: 29
PIF_VALUE: 32
PIF_VALUE: 29
PIF_VALUE: 29
PIF_VALUE: 23
PIF_VALUE: 29
PIF_VALUE: 0
PIF_VALUE: 1
PIF_VALUE: 23
PIF_VALUE: 0
PIF_VALUE: 26
PIF_VALUE: 3
PIF_VALUE: 29
PIF_VALUE: 29
PIF_VALUE: 0
PIF_VALUE: 24
PIF_VALUE: 0
PIF_VALUE: 0
PIF_VALUE: 29
PIF_VALUE: 29
PIF_VALUE: 22
PIF_VALUE: 23
PIF_VALUE: 0
PIF_VALUE: 0
PIF_VALUE: 29
PIF_VALUE: 2
PIF_VALUE: 0
PIF_VALUE: 26
PIF_VALUE: 0
PIF_VALUE: 3
PIF_VALUE: 27
PIF_VALUE: 28
PIF_VALUE: 0
PIF_VALUE: 0
PIF_VALUE: 1
PIF_VALUE: 0
PIF_VALUE: 0
PIF_VALUE: 7
PIF_VALUE: 29
PIF_VALUE: 29
PIF_VALUE: 0
PIF_VALUE: 29
PIF_VALUE: 0
PIF_VALUE: 29
PIF_VALUE: 1
PIF_VALUE: 7
PIF_VALUE: 22
PIF_VALUE: 29
PIF_VALUE: 23
PIF_VALUE: 0
PIF_VALUE: 4
PIF_VALUE: 0
PIF_VALUE: 0
PIF_VALUE: 24
PIF_VALUE: 29
PIF_VALUE: 0
PIF_VALUE: 29
PIF_VALUE: 0
PIF_VALUE: 29
PIF_VALUE: 29
PIF_VALUE: 33
PIF_VALUE: 22
PIF_VALUE: 29
PIF_VALUE: 0
PIF_VALUE: 19
PIF_VALUE: 24
PIF_VALUE: 0
PIF_VALUE: 1
PIF_VALUE: 1
PIF_VALUE: 30
PIF_VALUE: 2
PIF_VALUE: 0
PIF_VALUE: 30
PIF_VALUE: 7
PIF_VALUE: 29
PIF_VALUE: 0
PIF_VALUE: 29
PIF_VALUE: 23
PIF_VALUE: 29
PIF_VALUE: 23
PIF_VALUE: 23
PIF_VALUE: 29
PIF_VALUE: 0
PIF_VALUE: 0
PIF_VALUE: 29
PIF_VALUE: 24
PIF_VALUE: 0
PIF_VALUE: 30
PIF_VALUE: 0
PIF_VALUE: 29
PIF_VALUE: 21
PIF_VALUE: 29
PIF_VALUE: 0
PIF_VALUE: 29
PIF_VALUE: 29
PIF_VALUE: 28
PIF_VALUE: 0
PIF_VALUE: 30
PIF_VALUE: 30
PIF_VALUE: 29
PIF_VALUE: 29
PIF_VALUE: 23
PIF_VALUE: 29
PIF_VALUE: 21
PIF_VALUE: 0
PIF_VALUE: 29
PIF_VALUE: 24
PIF_VALUE: 0
PIF_VALUE: 29
PIF_VALUE: 0
PIF_VALUE: 29
PIF_VALUE: 24
PIF_VALUE: 0
PIF_VALUE: 29
PIF_VALUE: 29
PIF_VALUE: 0
PIF_VALUE: 0
PIF_VALUE: 29
PIF_VALUE: 0
PIF_VALUE: 29
PIF_VALUE: 0
PIF_VALUE: 29
PIF_VALUE: 0
PIF_VALUE: 23
PIF_VALUE: 29
PIF_VALUE: 19
PIF_VALUE: 3
PIF_VALUE: 1
PIF_VALUE: 0
PIF_VALUE: 29
PIF_VALUE: 0
PIF_VALUE: 1
PIF_VALUE: 30
PIF_VALUE: 1
PIF_VALUE: 0
PIF_VALUE: 5
PIF_VALUE: 30
PIF_VALUE: 28
PIF_VALUE: 0
PIF_VALUE: 2
PIF_VALUE: 0
PIF_VALUE: 30
PIF_VALUE: 29
PIF_VALUE: 0
PIF_VALUE: 28
PIF_VALUE: 29
PIF_VALUE: 29
PIF_VALUE: 28
PIF_VALUE: 0
PIF_VALUE: 1

## 2018-01-01 ASSESSMENT — PAIN SCALES - GENERAL
PAINLEVEL_OUTOF10: 3
PAINLEVEL_OUTOF10: 8
PAINLEVEL_OUTOF10: 8
PAINLEVEL_OUTOF10: 0
PAINLEVEL_OUTOF10: 3
PAINLEVEL_OUTOF10: 4
PAINLEVEL_OUTOF10: 1
PAINLEVEL_OUTOF10: 6
PAINLEVEL_OUTOF10: 4
PAINLEVEL_OUTOF10: 6
PAINLEVEL_OUTOF10: 3
PAINLEVEL_OUTOF10: 0
PAINLEVEL_OUTOF10: 3
PAINLEVEL_OUTOF10: 3
PAINLEVEL_OUTOF10: 0
PAINLEVEL_OUTOF10: 6
PAINLEVEL_OUTOF10: 3
PAINLEVEL_OUTOF10: 6
PAINLEVEL_OUTOF10: 5
PAINLEVEL_OUTOF10: 0
PAINLEVEL_OUTOF10: 3
PAINLEVEL_OUTOF10: 7
PAINLEVEL_OUTOF10: 5
PAINLEVEL_OUTOF10: 7
PAINLEVEL_OUTOF10: 3
PAINLEVEL_OUTOF10: 0
PAINLEVEL_OUTOF10: 3
PAINLEVEL_OUTOF10: 3
PAINLEVEL_OUTOF10: 1
PAINLEVEL_OUTOF10: 8
PAINLEVEL_OUTOF10: 6
PAINLEVEL_OUTOF10: 4
PAINLEVEL_OUTOF10: 3
PAINLEVEL_OUTOF10: 3
PAINLEVEL_OUTOF10: 0
PAINLEVEL_OUTOF10: 4
PAINLEVEL_OUTOF10: 3
PAINLEVEL_OUTOF10: 0
PAINLEVEL_OUTOF10: 6
PAINLEVEL_OUTOF10: 3
PAINLEVEL_OUTOF10: 3
PAINLEVEL_OUTOF10: 8
PAINLEVEL_OUTOF10: 3
PAINLEVEL_OUTOF10: 0
PAINLEVEL_OUTOF10: 0
PAINLEVEL_OUTOF10: 5
PAINLEVEL_OUTOF10: 0
PAINLEVEL_OUTOF10: 0
PAINLEVEL_OUTOF10: 6
PAINLEVEL_OUTOF10: 0
PAINLEVEL_OUTOF10: 6
PAINLEVEL_OUTOF10: 3
PAINLEVEL_OUTOF10: 6
PAINLEVEL_OUTOF10: 3
PAINLEVEL_OUTOF10: 2
PAINLEVEL_OUTOF10: 1
PAINLEVEL_OUTOF10: 3
PAINLEVEL_OUTOF10: 3
PAINLEVEL_OUTOF10: 5
PAINLEVEL_OUTOF10: 0
PAINLEVEL_OUTOF10: 6
PAINLEVEL_OUTOF10: 6
PAINLEVEL_OUTOF10: 0
PAINLEVEL_OUTOF10: 7
PAINLEVEL_OUTOF10: 6
PAINLEVEL_OUTOF10: 0
PAINLEVEL_OUTOF10: 3
PAINLEVEL_OUTOF10: 0
PAINLEVEL_OUTOF10: 0
PAINLEVEL_OUTOF10: 3
PAINLEVEL_OUTOF10: 0
PAINLEVEL_OUTOF10: 3
PAINLEVEL_OUTOF10: 6
PAINLEVEL_OUTOF10: 3
PAINLEVEL_OUTOF10: 6
PAINLEVEL_OUTOF10: 8
PAINLEVEL_OUTOF10: 0
PAINLEVEL_OUTOF10: 5
PAINLEVEL_OUTOF10: 0
PAINLEVEL_OUTOF10: 1
PAINLEVEL_OUTOF10: 0
PAINLEVEL_OUTOF10: 0
PAINLEVEL_OUTOF10: 5
PAINLEVEL_OUTOF10: 3
PAINLEVEL_OUTOF10: 0
PAINLEVEL_OUTOF10: 3
PAINLEVEL_OUTOF10: 6
PAINLEVEL_OUTOF10: 6
PAINLEVEL_OUTOF10: 7
PAINLEVEL_OUTOF10: 3
PAINLEVEL_OUTOF10: 3
PAINLEVEL_OUTOF10: 0
PAINLEVEL_OUTOF10: 7
PAINLEVEL_OUTOF10: 0
PAINLEVEL_OUTOF10: 6
PAINLEVEL_OUTOF10: 0
PAINLEVEL_OUTOF10: 3
PAINLEVEL_OUTOF10: 3
PAINLEVEL_OUTOF10: 6
PAINLEVEL_OUTOF10: 7
PAINLEVEL_OUTOF10: 4
PAINLEVEL_OUTOF10: 3
PAINLEVEL_OUTOF10: 4
PAINLEVEL_OUTOF10: 6
PAINLEVEL_OUTOF10: 0
PAINLEVEL_OUTOF10: 5
PAINLEVEL_OUTOF10: 3
PAINLEVEL_OUTOF10: 6
PAINLEVEL_OUTOF10: 3
PAINLEVEL_OUTOF10: 3
PAINLEVEL_OUTOF10: 0
PAINLEVEL_OUTOF10: 2
PAINLEVEL_OUTOF10: 7

## 2018-01-01 ASSESSMENT — PAIN DESCRIPTION - FREQUENCY
FREQUENCY: INTERMITTENT
FREQUENCY: INTERMITTENT
FREQUENCY: CONTINUOUS
FREQUENCY: INTERMITTENT
FREQUENCY: CONTINUOUS

## 2018-01-01 ASSESSMENT — ENCOUNTER SYMPTOMS
ABDOMINAL PAIN: 1
NAUSEA: 0
ABDOMINAL PAIN: 1
COUGH: 0
EYES NEGATIVE: 1
EYES NEGATIVE: 1
COUGH: 0
BLURRED VISION: 0
WHEEZING: 0
ABDOMINAL PAIN: 0
ORTHOPNEA: 0
EYES NEGATIVE: 1
SHORTNESS OF BREATH: 1
COUGH: 0
RESPIRATORY NEGATIVE: 1
SHORTNESS OF BREATH: 1
VOMITING: 0
RESPIRATORY NEGATIVE: 1
ABDOMINAL PAIN: 0
ABDOMINAL PAIN: 0
EYES NEGATIVE: 1
ABDOMINAL PAIN: 0
CHEST TIGHTNESS: 0
GASTROINTESTINAL NEGATIVE: 1
RESPIRATORY NEGATIVE: 1
ABDOMINAL PAIN: 0
VOMITING: 0
EYE PAIN: 0
ALLERGIC/IMMUNOLOGIC NEGATIVE: 1
VOMITING: 0
ORTHOPNEA: 0
ALLERGIC/IMMUNOLOGIC NEGATIVE: 1
CHEST TIGHTNESS: 0
COUGH: 0
VOMITING: 1
COUGH: 0
DOUBLE VISION: 0
BACK PAIN: 0
VOMITING: 0
GASTROINTESTINAL NEGATIVE: 1
VOMITING: 0
SHORTNESS OF BREATH: 1
RESPIRATORY NEGATIVE: 1
NAUSEA: 0
ABDOMINAL PAIN: 1
COUGH: 0
ABDOMINAL PAIN: 0
SHORTNESS OF BREATH: 0
BACK PAIN: 1
NAUSEA: 0
ORTHOPNEA: 0
COUGH: 0
ALLERGIC/IMMUNOLOGIC NEGATIVE: 1
ORTHOPNEA: 0
SHORTNESS OF BREATH: 0
NAUSEA: 0
ABDOMINAL PAIN: 0
GASTROINTESTINAL NEGATIVE: 1
ABDOMINAL PAIN: 1
EYES NEGATIVE: 1
EYES NEGATIVE: 1
RESPIRATORY NEGATIVE: 1
SHORTNESS OF BREATH: 0
VOMITING: 0
ALLERGIC/IMMUNOLOGIC NEGATIVE: 1
NAUSEA: 1
RHINORRHEA: 0
ALLERGIC/IMMUNOLOGIC NEGATIVE: 1
ABDOMINAL PAIN: 0
ALLERGIC/IMMUNOLOGIC NEGATIVE: 1
SHORTNESS OF BREATH: 1
NAUSEA: 1
NAUSEA: 0
VOMITING: 0
EYE REDNESS: 0
RHINORRHEA: 0
ABDOMINAL PAIN: 0
DIARRHEA: 0
SHORTNESS OF BREATH: 1
COUGH: 0
COUGH: 0
SORE THROAT: 0
ALLERGIC/IMMUNOLOGIC NEGATIVE: 1
EYES NEGATIVE: 1
RESPIRATORY NEGATIVE: 1
ABDOMINAL PAIN: 0
ORTHOPNEA: 0
DIARRHEA: 1
SHORTNESS OF BREATH: 1
SHORTNESS OF BREATH: 0
SHORTNESS OF BREATH: 0
NAUSEA: 0
RESPIRATORY NEGATIVE: 1
ABDOMINAL DISTENTION: 0
COUGH: 0
ABDOMINAL PAIN: 1

## 2018-01-01 ASSESSMENT — LIFESTYLE VARIABLES
SMOKING_STATUS: 0

## 2018-01-01 ASSESSMENT — PAIN DESCRIPTION - ORIENTATION
ORIENTATION: RIGHT
ORIENTATION: LOWER
ORIENTATION: RIGHT
ORIENTATION: RIGHT;LOWER

## 2018-01-01 ASSESSMENT — PAIN DESCRIPTION - PAIN TYPE
TYPE: ACUTE PAIN
TYPE: SURGICAL PAIN
TYPE: ACUTE PAIN
TYPE: CHRONIC PAIN
TYPE: ACUTE PAIN
TYPE: ACUTE PAIN

## 2018-01-01 ASSESSMENT — PAIN DESCRIPTION - LOCATION
LOCATION: ABDOMEN
LOCATION: VAGINA
LOCATION: ABDOMEN
LOCATION: KNEE
LOCATION: PERINEUM
LOCATION: ABDOMEN
LOCATION: ABDOMEN
LOCATION: ABDOMEN;LEG

## 2018-01-01 ASSESSMENT — PAIN DESCRIPTION - PROGRESSION
CLINICAL_PROGRESSION: GRADUALLY IMPROVING
CLINICAL_PROGRESSION: GRADUALLY WORSENING
CLINICAL_PROGRESSION: GRADUALLY IMPROVING
CLINICAL_PROGRESSION: GRADUALLY IMPROVING
CLINICAL_PROGRESSION: NOT CHANGED
CLINICAL_PROGRESSION: GRADUALLY IMPROVING
CLINICAL_PROGRESSION: GRADUALLY IMPROVING
CLINICAL_PROGRESSION: NOT CHANGED

## 2018-01-01 ASSESSMENT — PAIN DESCRIPTION - DESCRIPTORS
DESCRIPTORS: OTHER (COMMENT)
DESCRIPTORS: ACHING;CRAMPING
DESCRIPTORS: ACHING;CRAMPING
DESCRIPTORS: BURNING
DESCRIPTORS: OTHER (COMMENT)
DESCRIPTORS: PRESSURE
DESCRIPTORS: DISCOMFORT
DESCRIPTORS: PRESSURE

## 2018-01-01 ASSESSMENT — PAIN DESCRIPTION - ONSET
ONSET: ON-GOING
ONSET: ON-GOING
ONSET: OTHER (COMMENT)
ONSET: ON-GOING

## 2018-01-23 PROBLEM — R18.8 OTHER ASCITES: Status: ACTIVE | Noted: 2018-01-01

## 2018-01-23 PROBLEM — N17.9 AKI (ACUTE KIDNEY INJURY) (HCC): Status: ACTIVE | Noted: 2018-01-01

## 2018-01-23 PROBLEM — I89.0 CHRONIC ACQUIRED LYMPHEDEMA: Status: ACTIVE | Noted: 2018-01-01

## 2018-01-23 NOTE — H&P
low at 2.8  Past Medical History:     Past Medical History:   Diagnosis Date    Cellulitis     Headache       Past Surgical History:     History reviewed. No pertinent surgical history. Medications Prior to Admission:     Prior to Admission medications    Medication Sig Start Date End Date Taking? Authorizing Provider   furosemide (LASIX) 20 MG tablet TAKE 1 TABLET DAILY 11/2/17  Yes Tricia Salazar CNP   potassium chloride (KLOR-CON M) 20 MEQ extended release tablet TAKE 1 TABLET DAILY 11/2/17  Yes Triciacelia Salazar CNP   potassium chloride (KLOR-CON M) 20 MEQ TBCR extended release tablet take 1 tablet by mouth once daily 6/7/17   Tricia Salazar CNP   levothyroxine (LEVOTHROID) 25 MCG tablet Take 1 tablet by mouth daily 5/24/17   Tricia Salazar CNP        Allergies:     Review of patient's allergies indicates no known allergies. Social History:     Tobacco:    reports that she has never smoked. She has never used smokeless tobacco.  Alcohol:      reports that she does not drink alcohol. Drug Use:  reports that she does not use drugs. Family History:     Family History   Problem Relation Age of Onset    Alcohol Abuse Father        Review of Systems:     Positive and Negative as described in HPI. Review of Systems   Constitutional: Negative for chills, fever, malaise/fatigue and weight loss. HENT: Negative for congestion and sore throat. Eyes: Negative for blurred vision and double vision. Respiratory: Positive for shortness of breath. Negative for wheezing. Cardiovascular: Positive for leg swelling. Negative for chest pain and palpitations. Gastrointestinal: Positive for abdominal pain and nausea. Negative for diarrhea and vomiting. Genitourinary: Negative for frequency and urgency. Musculoskeletal: Positive for back pain. Negative for joint pain and myalgias. Skin: Negative for itching and rash. Neurological: Negative for dizziness and headaches. Physical Exam:   /80   Pulse 108   Temp 97.6 °F (36.4 °C) (Oral)   Resp 17   Ht 5' 3\" (1.6 m)   Wt (!) 350 lb (158.8 kg)   SpO2 99%   BMI 62.00 kg/m²   Temp (24hrs), Av.6 °F (36.4 °C), Min:97.6 °F (36.4 °C), Max:97.6 °F (36.4 °C)    No results for input(s): POCGLU in the last 72 hours. Intake/Output Summary (Last 24 hours) at 18 1618  Last data filed at 18 1433   Gross per 24 hour   Intake                0 ml   Output                3 ml   Net               -3 ml       Physical Exam   Constitutional: She is oriented to person, place, and time and well-developed, well-nourished, and in no distress. No distress. Morbidly obese   HENT:   Head: Normocephalic and atraumatic. Mouth/Throat: Oropharynx is clear and moist.   Eyes: EOM are normal. Pupils are equal, round, and reactive to light. Neck: Normal range of motion. Neck supple. Cardiovascular: Normal rate and regular rhythm. Exam reveals no gallop and no friction rub. No murmur heard. Tachycardia    Abdominal: Bowel sounds are normal. She exhibits distension. There is tenderness (diffuse). There is no rebound and no guarding. Musculoskeletal: She exhibits edema (Chronic Lymphedema with bilateral leg wounds, medial aspect of the lower leg ). Neurological: She is alert and oriented to person, place, and time. Skin: Skin is warm and dry. She is not diaphoretic.      Investigations:      Laboratory Testing:  Recent Results (from the past 24 hour(s))   CBC Auto Differential    Collection Time: 18  1:20 PM   Result Value Ref Range    WBC 15.9 (H) 3.5 - 11.0 k/uL    RBC 4.54 4.0 - 5.2 m/uL    Hemoglobin 13.6 12.0 - 16.0 g/dL    Hematocrit 39.8 36 - 46 %    MCV 87.6 80 - 100 fL    MCH 29.9 26 - 34 pg    MCHC 34.1 31 - 37 g/dL    RDW 13.9 11.5 - 14.9 %    Platelets 744 799 - 965 k/uL    MPV 7.9 6.0 - 12.0 fL    NRBC Automated NOT REPORTED per 100 WBC    Differential Type NOT REPORTED     Immature Granulocytes NOT

## 2018-01-23 NOTE — ED PROVIDER NOTES
[address] ED  eMERGENCY dEPARTMENT eNCOUnter    Pt Name: Serjio Kebede  MRN: [id number]  YOB: 1955  Date of evaluation: 1/23/18  PCP: Darron Houser [id number]       Chief Complaint   Patient presents with    Abdominal Pain     x 1 week     Nausea    Anorexia       HISTORY OF PRESENT ILLNESS    Serjio Kebede is a 57 y.o. female who presents With a chief complaint abdominal pain and nausea. Patient states symptoms have been going on for approximately one week. Rates pain as 5 out of 10 in severity. Pain is sharp and worse in her right lower quadrant however is diffuse. Also reports some constipation throughout the past week however she was able to have a normal bowel movement this point. Denies any significant emesis. States her pain does not radiate. Nothing makes it better or worse. Patient was concerned this morning because her abdomen seemed more distended than usual.  She denies any recent fever or chills. Denies any chest pain or palpitations however she does complain of mild shortness of breath at this time. Patient denies any prior surgeries on her abdomen. Last oral intake was breakfast this morning. Patient has no other additional complaints at this time. REVIEW OF SYSTEMS       Constitutional: Denies recent fever, chills, fatigue. HEENT: Denies visual changes, ear pain, congestion, sore throat. Neck: Denies neck pain or swelling. Respiratory: Denies recent cough. Positive for shortness of breath. Cardiac:  Denies recent chest pain or palpitations. GI: Positive for abdominal pain, nausea, obstipation. Denies diarrhea or vomiting. : Denies dysuria or hematuria. Musculoskeletal: Denies edema or pain. Neurologic: Denies headache, numbness or focal weakness. Skin:  Denies rash or wound. Negative in 10 essential Systems except as mentioned above and in the HPI.         PAST MEDICAL HISTORY    has a past medical history of may be   obtained. XR CHEST PORTABLE   Final Result   No subdiaphragmatic free air. No consolidation or sizable pleural effusion. ED BEDSIDE ULTRASOUND:      LABS:  Labs Reviewed   CBC WITH AUTO DIFFERENTIAL - Abnormal; Notable for the following:        Result Value    WBC 15.9 (*)     Seg Neutrophils 84 (*)     Lymphocytes 6 (*)     Monocytes 9 (*)     Segs Absolute 13.36 (*)     Absolute Lymph # 0.95 (*)     Absolute Mono # 1.43 (*)     All other components within normal limits   COMPREHENSIVE METABOLIC PANEL - Abnormal; Notable for the following:     Glucose 157 (*)     BUN 53 (*)     CREATININE 2.05 (*)     Calcium 8.1 (*)     Sodium 133 (*)     Chloride 97 (*)     Alb 2.8 (*)     GFR Non- 24 (*)     GFR  30 (*)     All other components within normal limits   UA W/REFLEX CULTURE - Abnormal; Notable for the following:     Bilirubin Urine LARGE (*)     Urine Hgb MODERATE (*)     Protein, UA 1+ (*)     All other components within normal limits   MICROSCOPIC URINALYSIS - Abnormal; Notable for the following:     Bacteria, UA FEW (*)     All other components within normal limits   LIPASE   MAGNESIUM   LACTIC ACID, PLASMA   PROTIME-INR   APTT         EMERGENCY DEPARTMENT COURSE:   Vitals:    Vitals:    01/23/18 1345 01/23/18 1400 01/23/18 1430 01/23/18 1544   BP: (!) 116/91 (!) 122/98 127/80 112/80   Pulse:   114 108   Resp:   18 17   Temp:       TempSrc:       SpO2: 96% 96% 96% 99%   Weight:       Height:         4:25 PM-CT abdomen results show extensive ascites as well as a possible mass on the patient's ovary. EKGs results of the patient. Lab work shows evidence of acute kidney injury however no other significant abnormalities including a lack of evident liver disease. Patient was reexamined again. I do not believe patient needs a therapeutic or diagnostic paracentesis in the emergency department. She is in no respiratory distress.   Her tachycardia has improved. I spoke with internal medicine resident who will admit patient under Dr. Moe Kilpatrick. CRITICAL CARE:      CONSULTS:  IP CONSULT TO INTERNAL MEDICINE  IP CONSULT TO SOCIAL WORK      PROCEDURES:      FINAL IMPRESSION      1.  JOCELINE (acute kidney injury) ([facility] [address]    2. Other ascites            DISPOSITION/PLAN   DISPOSITION Admitted 01/23/2018 04:01:17 PM        PATIENT REFERRED TO:  Alva Morrissey, CNP  [address] [facility]  [phone]            DISCHARGE MEDICATIONS:  New Prescriptions    No medications on file       (Please note that portions of this note were completed with a voice recognition program.  Efforts were made to edit the dictations but occasionally words are mis-transcribed.)    Anabell Irizarry DO  Attending Emergency Physician         Anabell Irizarry DO  01/23/18 0681

## 2018-01-24 NOTE — PROGRESS NOTES
[facility]    Progress Note    1/24/2018    8:41 AM    Name:   Kirill Pope  MRN:     [id number]     Acct:      [de-identified]   Room:   2101/2101-[facility] Day:  1  Admit Date:  1/23/2018 11:23 AM    PCP:   Jayme Doty CNP  Code Status:  Full Code    Subjective:     C/C:   Chief Complaint   Patient presents with    Abdominal Pain     x 1 week     Nausea    Anorexia     Interval History Status: not changed. Patient seen and examined at bedside this morning. No acute events overnight. Pt resting comfortably, was able to eat some of her breakfast. Pt states her pain has been relieved with medication but continues to have SOB and nausea at times. Pt scheduled for paracentesis and transvaginal US today. No complaints of CP, fever chills or vomiting. Brief History:   The patient is a 57 y.o.  female who presents with Abdominal Pain (x 1 week ); Nausea; and Anorexia. Pt is admitted to the hospital for the management of JOCELINE and large abdominal mass with ascites. Pt states that for the past week she has been having increased swelling of her abdomen. Pt thought it was possible bowel obstruction or fecal impaction and took OTC laxatives which helped relieve some pressure. Pt states she has abdominal discomfort that is aching in nature, 5/10 in severity and non-radiating. Pt has associated nausea, SOB and satiety feeling that led to decreased oral intake. Pt denies having any fever, chills, emesis or prior surgeries. Pt has no hx of liver disease, DM, HTN or family history of Breast/Ovarian cancer. Pt states her mother did have a large mass in her uterus which led to a hysterectomy at the age of 36 but it was non-malignant. Pt does not smoke any cigarettes. Pt states she has been having vaginal bleeding/spotting.  LMP was \"years ago\" and the pt never discussed the spotting with her PCP.     In the ED, Pt was given a bolus of IVF, morphine and the key elements of the encounter have been performed by me. I agree with the assessment, plan, and orders as documented by the resident.   It is my impression that she has CA ovary with ascites   Electronically signed by Janine Veliz MD on 1/24/2018 at 8:24 PM

## 2018-01-24 NOTE — PLAN OF CARE
Problem: Nutrition  Goal: Optimal nutrition therapy  Outcome: Ongoing  Nutrition Problem: Altered nutrition-related lab values  Intervention: Food and/or Nutrient Delivery: Continue current diet  Nutritional Goals: PO intakes greater than 75% at meals

## 2018-01-24 NOTE — PROGRESS NOTES
Nutrition Assessment    Type and Reason for Visit: Positive Nutrition Screen, Consult (Pressure ulcer or non-healing wound. Dieititian consult needed: wounds, poor appetite. Consult: nutritional evaluation)    Nutrition Recommendations: Continue Cardiac diet. Malnutrition Assessment:  · Malnutrition Status: Mild Malnutrition  · Context: Acute illness or injury  · Findings of the 6 clinical characteristics of malnutrition (Minimum of 2 out of 6 clinical characteristics is required to make the diagnosis of moderate or severe Protein Calorie Malnutrition based on AND/ASPEN Guidelines):  1. Energy Intake-Less than or equal to 50%, greater than or equal to 5 days    2. Weight Loss-No significant weight loss,    3. Fat Loss-No significant subcutaneous fat loss,    4. Muscle Loss-Unable to assess,    5. Fluid Accumulation-Severe fluid accumulation, Extremities, Ascites  6.  Strength-Not measured    Nutrition Diagnosis:   · Problem: Altered nutrition-related lab values  · Etiology: related to Renal dysfunction, Endocrine dysfunction     Signs and symptoms:  as evidenced by Diet history of poor intake, Intake 0-25%, Lab values, Localized or generalized fluid accumulation (ascites)    Nutrition Assessment:  · Subjective Assessment: Patient reports lack of appetite due to ascites. Patient states that 6.2 L of fluid was removed during paracentesis this morning. Patient states that she can breath better and her appetite has returned. Patient ate 100% of lunch.    · Nutrition-Focused Physical Findings: excoration to inner thigh and abdominal folds  · Wound Type: Pressure Ulcer  · Current Nutrition Therapies:  · Oral Diet Orders: Cardiac   · Oral Diet intake: % (s/p paracentesis)  · Anthropometric Measures:  · Ht: 5' 3\" (160 cm)   · Current Body Wt: 345 lb (156.5 kg)  · Admission Body Wt: 345 lb (156.5 kg) (prior to paracentesis)  · Ideal Body Wt: 115 lb (52.2 kg), % Ideal Body 300%  · BMI Classification: BMI >

## 2018-01-24 NOTE — CONSULTS
[address]    Patient Name: Bandar Banks     Patient : 1955  Room/Bed: 2101/2101-01  Admission Date/Time: 2018 11:23 AM  Primary Care Physician: Abdi Tovar CNP    Consulting Provider: Dr. Artemio Castro MD   Reason for Consult: Pelvic mass and abdominal ascites, concerning for gynecologic malignancy     CC:   Chief Complaint   Patient presents with    Abdominal Pain     x 1 week     Nausea    Anorexia                HPI: Bandar Banks is a 57 y.o. female  who presented to the Emergency Department yesterday with complaints of abdominal pain, nausea, decreased appetite, and abdominal distension for the past week. Patient describes her abdominal pain as an aching pressure sensation that she states was a 5 out of 10 on arrival to the ED. Patient states that she originally thought her symptoms were secondary to constipation and she tried taking OTC laxatives with minimal relief. She states that yesterday she started experiencing worsening abdominal pain, SOB, and increased satiety. She denies any fever/chills, headache, chest pain, vomiting, or diarrhea. CT abdomen/pelvis was performed and demonstrated a pelvic mass measuring 13 cm likely ovarian in origin, bilateral pelvic lymphadenopathy, and large volume abdominal ascites concerning for malignancy. Chest xray was unremarkable. Patient was admitted to internal medicine for further evaluation. TVUS was performed and demonstrated a large heterogenous mass-like area measuring up to 10.8 cm, an enlarged uterus measuring 12 cm, and thickened endometrium (16 mm) however the study was significantly limited secondary to patients body habitus. Patient underwent a IR guided paracentesis earlier today during which 6.2 L of fluid was removed. CA-125 was collected and found to be elevated at 151. Patient states that she feels significantly better following the paracentesis.  She states her abdominal pain, nausea, 1/23/2018  EXAMINATION: CT OF THE ABDOMEN AND PELVIS WITHOUT CONTRAST 1/23/2018 2:51 pm TECHNIQUE: CT of the abdomen and pelvis was performed without the administration of intravenous contrast. Multiplanar reformatted images are provided for review. Dose modulation, iterative reconstruction, and/or weight based adjustment of the mA/kV was utilized to reduce the radiation dose to as low as reasonably achievable. COMPARISON: None HISTORY: ORDERING SYSTEM PROVIDED HISTORY: abd pain, distention TECHNOLOGIST PROVIDED HISTORY: Additional Contrast?->None Ordering Physician Provided Reason for Exam: Pt is confused and altered mental status since yesterday. Acuity: Acute Type of Exam: Initial FINDINGS: Lower Chest: No significant abnormality at the lung bases. Organs: Exam is limited by the absence of intravenous contrast. No urinary tract calculus. The kidneys are symmetric and normal in size. No perinephric or periureteral stranding. No collecting system dilatation. No focal renal lesion. No focal liver lesion. There is cholelithiasis without evidence for acute cholecystitis. No biliary ductal dilatation. The spleen is normal in size without focal lesion. The pancreas and the adrenal glands are unremarkable. Mild diffuse left adrenal gland thickening without focal lesion. The right adrenal gland is unremarkable. GI/Bowel: The appendix is normal.  No bowel obstruction. Pelvis: There is a soft tissue mass measuring 8.7 x 13 cm in the pelvis (series 2, image 165). This appears to be separate from the uterus. Peritoneum/Retroperitoneum: Large volume ascites is present. There are several mildly enlarged bilateral pelvic and retroperitoneal lymph nodes. A representative aortocaval lymph node measures 2 cm (series 2, image 116). Bones/Soft Tissues: There are lumbar Schmorl's nodes. Tiny umbilical hernia containing ascites. Large 13 cm pelvic mass which is probably an abnormal ovary.   The presence of bilateral lung volumes. No consolidation or large pleural effusion. Slight motion left hemidiaphragm but no definite subdiaphragmatic free air. DJD spine. No subdiaphragmatic free air. No consolidation or sizable pleural effusion. Ir Us Guided Paracentesis    Result Date: 2018  PROCEDURE: ULTRASOUND GUIDED PARACENTESIS 2018 HISTORY: ORDERING SYSTEM PROVIDED HISTORY: ascites TECHNOLOGIST PROVIDED HISTORY: Reason for exam:->ascites Abdominal distention, ascites TECHNIQUE: This procedure was performed by Dr. Chandu Gunderson. Informed consent was obtained after a detailed explanation of the procedure including risks, benefits, and alternatives. Universal protocol was followed. Ultrasound shows a moderate to large amount of ascites. The right abdomen was prepped and draped in sterile fashion and local anesthesia was achieved with 1% lidocaine. Using realtime ultrasound guidance a 5 Slovak centesis catheter/needle was advanced into the peritoneal fluid. Ultrasound images show a safe tract and access into the fluid. Little to no fluid remains on completion. The catheter was removed and a sterile dressing applied. There are no immediate complications. The patient left the department in stable condition. A collected specimen was sent with the patient to the floor to send for any desired laboratory testing. FINDINGS: A total of 6.2 L of clear dark colored fluid was removed. Supplemental albumin was given intravenously. Successful ultrasound guided paracentesis.        ASSESSMENT & PLAN:    Lorenzo Hoyos is a 57 y.o. female  who presents with a pelvic mass and abdominal ascites concerning for gynecologic malignancy    - Vital signs stable, patient afebrile    - CA-125 elevated at 151    - Chest xray unremarkable    - CT Abdomen/Pelvis: pelvic mass measuring 13 cm appears separate from uterus, bilateral enlarged pelvic lymph nodes, large volume ascites   - TVUS: Large heterogeneous masslike area measuring up to 10.8 cm, enlarged uterus (12 cm), thickened endometrium (16 mm), ovaries not seen, study significantly limited by patients body habitus    - S/p IR guided paracentesis during which 6.2 L of fluid was removed, cytology pending    - Heme/Onc consult pending    - Bimanual exam demonstrates softball sized pelvic mass against posterior vaginal wall    - Patient counseled that presence of pelvic mass, abdominal ascites, and elevated CA-125 are concerning for gynecologic malignancy, likely ovarian in origin. Discussed recommendation for follow up outpatient with gynecologic oncologist for further evaluation and management. Patient agreeable to referral to Dr. Dona Alvarez. Patient instructed to contact Dr. Brandon Herr office upon discharge to establish care and schedule an appointment. Post Menopausal Bleeding    - TVUS demonstrates thickened endometrial lining of 16 mm    - Recommend outpatient follow up and evaluation         Case discussed with Dr. Fab Gonzalez. Presence of pelvic mass, abdominal ascites, and elevated CA-125 concerning for gynecologic malignancy. Recommend outpatient follow up with Gyn Onc for further management and evaluation. Patient requests to see provider within the [facility], information provided to contact Dr. Brandon Herr office to schedule an appointment upon discharge. All questions answered. Will sign off at this time. Thank you for this consult, please do not hesitate to contact with any questions or concerns.      Patient Active Problem List    Diagnosis Date Noted    JOCELINE (acute kidney injury) ([address] 01/23/2018    Other ascites 01/23/2018    Chronic acquired lymphedema 01/23/2018    Hypothyroidism 05/24/2017    Cellulitis of right lower extremity 04/12/2017    Morbid obesity ([address] 04/12/2017    Suspected deep tissue injury 04/12/2017    Alteration in skin integrity due to moisture 04/12/2017    Fungal disease          Attending's Name: Dr. Kim Cordoba, DO  Ob/Gyn Resident Pager: [phone]  1/24/2018, 6:28 PM

## 2018-01-24 NOTE — PROGRESS NOTES
[facility]  Wound Care Inpatient Consult Note               Halley Salazar  AGE: 57 y.o. GENDER: female  : 1955  TODAY'S DATE:  2018  Consulted for wound care by:Tuan Arzate MD    Subjective:        HISTORY of PRESENT ILLNESS HPI   Halley Salazar is a 57 y.o. female who presents today for wound evaluation. Wound Type:moisture associated skin breakdown in skin folds of legs, bialteral groin, abdominal pannus  Modifying factors:lymphedema, decreased mobility, obesity and poor hygiene  57year old female known to wound care from previous visit during inpatient hospital stay . She was seen in 2017 with moisture associated skin breakdown to skin folds of her abdomen and groin and bilateral heel injury related to pressure. She states she was using interdry to the skin folds with healing of the areas but she states since she has been sick she stopped using the product. Left anterior thigh with red dry scaly skin. Patient denies urinary incontinence. PAST MEDICAL HISTORY        Diagnosis Date    Cellulitis     Headache        PAST SURGICAL HISTORY    History reviewed. No pertinent surgical history. FAMILY HISTORY    Family History   Problem Relation Age of Onset    Alcohol Abuse Father        SOCIAL HISTORY    Social History   Substance Use Topics    Smoking status: Never Smoker    Smokeless tobacco: Never Used    Alcohol use No         ALLERGIES    No Known Allergies        REVIEW OF SYSTEMS    Pertinent items are noted in HPI. Objective:      BP 98/65   Pulse 107   Temp 97.5 °F (36.4 °C) (Oral)   Resp 16   Ht 5' 3\" (1.6 m)   Wt (!) 350 lb (158.8 kg)   SpO2 100%   BMI 62.00 kg/m²   General Appearance: alert and oriented to person, place and time, well-developed and well-nourished, in no acute distress  Skin: warm and dry and skin breakdown noted to skin folds of lower legs, groin, and pannus. The patients pain isPain Level: 6 Pain Type: Acute pain. Assessment:     Patient Active Problem List   Diagnosis    Cellulitis of right lower extremity    Morbid obesity ([facility] [address]    Suspected deep tissue injury    Alteration in skin integrity due to moisture    Fungal disease    Hypothyroidism    JOCELINE (acute kidney injury) ([facility] [address]    Other ascites    Chronic acquired lymphedema       Measurements:  Wound 04/12/17 Blister Leg Left; Lower; Lateral;Posterior (Active)   Dressing/Treatment Open to air 1/23/2018  4:49 PM   Wound Assessment Dry 1/23/2018  4:49 PM   Loreto-wound Assessment Dry;Red;Excoriated 1/23/2018  4:49 PM   Number of days: 286       Wound 01/23/18 Other (Comment) Leg Left; Lower (Active)   Dressing/Treatment Open to air 1/24/2018  4:32 AM   Wound Assessment Pink;Painful;Excoriated 1/24/2018  4:32 AM   Drainage Amount None 1/23/2018  4:51 PM   Drainage Description Serous 1/24/2018  4:32 AM   Odor Strong 1/24/2018  4:32 AM   Loreto-wound Assessment Dry;Excoriated;Brown 1/24/2018  4:32 AM   Number of days: 1       Wound 01/23/18 Other (Comment) (Active)   Dressing/Treatment Open to air 1/24/2018  4:32 AM   Wound Assessment Drainage; Excoriated;Painful;Red 1/24/2018  4:32 AM   Drainage Amount Small 1/24/2018  4:32 AM   Drainage Description Serous 1/24/2018  4:32 AM   Odor Strong 1/24/2018  4:32 AM   Loreto-wound Assessment Dry;Brown 1/24/2018  4:32 AM   Number of days: 1       Wound 01/23/18 Other (Comment) Thigh Inner Excoriation, moist redness (Active)   Wound Type Wound 1/24/2018  4:32 AM   Dressing/Treatment Open to air 1/24/2018  4:32 AM   Wound Assessment Excoriated;Painful;Red 1/24/2018  4:32 AM   Drainage Amount Scant 1/24/2018  4:32 AM   Drainage Description Serous 1/24/2018  4:32 AM   Odor Strong 1/24/2018  4:32 AM   Number of days: 0                Plan:     Treatment:    · Encourage/assist with repositioning every 2 hours  · Float heels off of bed surface with pillows  · Interdry between skin folds.  If Interdry used, leave 2 inches of fabric exposed to wick away moisture  · Covidien moisture wicking under pads  · Static air overlay. Check inflation each shift by sliding hand under the air overlay. Feel for the patient's heaviest/ most dependant body part. Ideally 1/2 to 1\" of air will be between your hand and the patient's body. Add air prn.  Bariatric bed offered- refused by patient                 Electronically signed by Bradley Mcmillan NP on 1/24/2018 at 1:20 PM

## 2018-01-24 NOTE — PROGRESS NOTES
Physical Therapy  DATE: 2018    NAME: Veronica Smith  MRN: [id number]   : 1955    Patient not seen this date for Physical Therapy due to:  [] Blood transfusion in progress  [] Hemodialysis  []  Patient Declined  [] Spine Precautions   [] Strict Bedrest  [] Surgery/ Procedure  [] Testing      [x] Other 18 at 1440- pt refuses all therapy- Pt and OT services- pt states she does NOT need it and will not be bothered at this time. Clinical lead Melita notified. [] PT being discontinued at this time. Patient independent. No further needs. [] PT being discontinued at this time as the patient has been transferred to palliative care. No further needs.     Lyndsey Riggins, PT

## 2018-01-24 NOTE — PLAN OF CARE
Problem: Risk for Impaired Skin Integrity  Goal: Tissue integrity - skin and mucous membranes  Structural intactness and normal physiological function of skin and  mucous membranes. Outcome: Ongoing  Patient has extensive wounds on BLE and abdominal pannus, skin assessed and interdry cloth placed in folds    Problem: Falls - Risk of  Goal: Absence of falls  Outcome: Ongoing  No falls this shift. Patient calls out appropriately, No falls during this shift.  Call light within reach, wheels locked, bed in lowest position, side rails 2/4    Problem: Pain Control  Goal: Control of acute pain  Outcome: Ongoing  Patient sleeping comfortably, see MAR for medication adminstration

## 2018-01-24 NOTE — CONSULTS
Today's Date: 1/24/2018  Patient Name: Ashley Wallace  Date of admission: 1/23/2018 11:23 AM  Patient's age: 57 y.o., 1955  Admission Dx: JOCELINE (acute kidney injury) ([facility] [address] [N17.9]    Reason for Consult: abd mass  Requesting Physician: Alisa Sharif MD    CHIEF COMPLAINT:  abd pain    History Obtained From:  Pt and chart    HISTORY OF PRESENT ILLNESS:      This is a 56 YO female was admitted with abd pain NV. She had CT abd which showed large ovarian mass. Noted to have JOCELINE. She had paracentesis with removal of 6 L fluid. Cytology awaited. She denied wt loss, night sweats. She is feeling much better after paracentesis. Past Medical History:   has a past medical history of Cellulitis and Headache. Past Surgical History:   has no past surgical history on file. Medications:    Prior to Admission medications    Medication Sig Start Date End Date Taking?  Authorizing Provider   furosemide (LASIX) 20 MG tablet TAKE 1 TABLET DAILY 11/2/17  Yes Esperanza Sanchez CNP   potassium chloride (KLOR-CON M) 20 MEQ extended release tablet TAKE 1 TABLET DAILY 11/2/17  Yes Esperanza Sanchez CNP   potassium chloride (KLOR-CON M) 20 MEQ TBCR extended release tablet take 1 tablet by mouth once daily 6/7/17   Esperanza Sanchez CNP   levothyroxine (LEVOTHROID) 25 MCG tablet Take 1 tablet by mouth daily 5/24/17   Esperanza Sanchez CNP     Current Facility-Administered Medications   Medication Dose Route Frequency Provider Last Rate Last Dose    [START ON 1/25/2018] influenza quadrivalent split vaccine (FLUZONE;FLUARIX;FLULAVAL;AFLURIA) injection 0.5 mL  0.5 mL Intramuscular Once Alisa Sharif MD        albumin human 25 % solution    Continuous PRN Tish Barlow MD   12.5 g at 01/24/18 1136    levothyroxine (SYNTHROID) tablet 25 mcg  25 mcg Oral Daily Annie Dee MD   25 mcg at 01/24/18 0537    furosemide (LASIX) tablet 20 mg  20 mg Oral Daily Annie Dee MD   20 mg at 01/24/18 6199    sodium chloride flush 0.9 % injection 10 mL  10 mL Intravenous 2 times per day Marcos Sutton MD   10 mL at 01/23/18 2240    sodium chloride flush 0.9 % injection 10 mL  10 mL Intravenous PRN Marcos Sutton MD        acetaminophen (TYLENOL) tablet 650 mg  650 mg Oral Q4H PRN Marcos Sutton MD        docusate sodium (COLACE) capsule 100 mg  100 mg Oral BID PRN Marcos Sutton MD   100 mg at 01/24/18 0752    ondansetron (ZOFRAN) injection 4 mg  4 mg Intravenous Q6H PRN Marcos Sutton MD        famotidine (PEPCID) injection 20 mg  20 mg Intravenous Daily Marcos Sutton MD   20 mg at 01/24/18 0751    potassium chloride (KLOR-CON M) extended release tablet 40 mEq  40 mEq Oral PRN Marcos Sutton MD        Or    potassium chloride 20 MEQ/15ML (10%) oral solution 40 mEq  40 mEq Oral PRN Marcos Sutton MD        Or    potassium chloride 10 mEq/100 mL IVPB (Peripheral Line)  10 mEq Intravenous PRN Marcos Sutton MD        morphine injection 2 mg  2 mg Intravenous Q4H PRN Marcos Sutton MD   2 mg at 01/24/18 0628    0.9 % sodium chloride infusion   Intravenous Continuous Renzo Diego  mL/hr at 01/24/18 0241      heparin (porcine) injection 5,000 Units  5,000 Units Subcutaneous 3 times per day Renzo Diego MD   5,000 Units at 01/23/18 2241       Allergies:  Review of patient's allergies indicates no known allergies. Social History:   reports that she has never smoked. She has never used smokeless tobacco. She reports that she does not drink alcohol or use drugs. Family History: family history includes Alcohol Abuse in her father. REVIEW OF SYSTEMS:    Constitutional: No fever or chills.  No night sweats, no weight loss   Eyes: No eye discharge, double vision, or eye pain   HEENT: negative for sore mouth, sore throat, hoarseness and voice change   Respiratory: negative for cough , sputum, dyspnea, wheezing, hemoptysis, chest pain   Cardiovascular: negative for chest pain, dyspnea, palpitations, orthopnea, PND   Gastrointestinal: negative for nausea, vomiting, diarrhea, constipation, ++abdominal pain, Dysphagia, hematemesis and hematochezia   Genitourinary: negative for frequency, dysuria, nocturia, urinary incontinence, and hematuria   Integument: negative for rash, skin lesions, bruises.    Hematologic/Lymphatic: negative for easy bruising, bleeding, lymphadenopathy, or petechiae   Endocrine: negative for heat or cold intolerance,weight changes, change in bowel habits and hair loss   Musculoskeletal: negative for myalgias, arthralgias, pain, joint swelling,and bone pain   Neurological: negative for headaches, dizziness, seizures, weakness, numbness    PHYSICAL EXAM:      BP 95/60   Pulse 107   Temp 97.5 °F (36.4 °C) (Oral)   Resp 20   Ht 5' 3\" (1.6 m)   Wt (!) 345 lb 10.9 oz (156.8 kg)   SpO2 98%   BMI 61.23 kg/m²    Temp (24hrs), Av.7 °F (36.5 °C), Min:97.5 °F (36.4 °C), Max:98.2 °F (36.8 °C)    General appearance - obese appearing, no in pain or distress   Mental status - alert and cooperative   Eyes - pupils equal and reactive, extraocular eye movements intact   Ears - bilateral TM's and external ear canals normal   Mouth - mucous membranes moist, pharynx normal without lesions   Neck - supple, no significant adenopathy   Lymphatics - no palpable lymphadenopathy, no hepatosplenomegaly   Chest - clear to auscultation, no wheezes, rales or rhonchi, symmetric air entry   Heart - normal rate, regular rhythm, normal S1, S2, no murmurs  Abdomen - soft, nontender, +++distended, no masses or organomegaly   Neurological - alert, oriented, normal speech, no focal findings or movement disorder noted   Musculoskeletal - no joint tenderness, deformity or swelling   Extremities - peripheral pulses normal, no pedal edema, no clubbing or cyanosis   Skin - normal coloration and turgor, no rashes, no suspicious skin lesions noted ,    DATA:    Labs:   CBC:   Recent Labs

## 2018-01-25 NOTE — DISCHARGE INSTR - DIET

## 2018-01-25 NOTE — PROGRESS NOTES
pleural effusion. Physical Examination:        Physical Exam  Constitutional: She is oriented to person, place, and time and well-developed, well-nourished, and in no distress. No distress. Morbidly obese   HENT:   Head: Normocephalic and atraumatic. Mouth/Throat: Oropharynx is clear and moist.   Eyes: EOM are normal. Pupils are equal, round, and reactive to light. Neck: Normal range of motion. Neck supple. Cardiovascular: Normal rate and regular rhythm. Exam reveals no gallop and no friction rub. No murmur heard. Abdominal: Bowel sounds are normal. No tenderness to palpation. There is no rebound and no guarding. Musculoskeletal: She exhibits edema (Chronic Lymphedema with bilateral leg wounds, medial aspect of the lower leg ). Neurological: She is alert and oriented to person, place, and time. Skin: Skin is warm and dry. She is not diaphoretic.      Assessment:        Primary Problem  JOCELINE (acute kidney injury) [facility])    Active Hospital Problems    Diagnosis Date Noted    JOCELINE (acute kidney injury) ([facility] [address] [N17.9] 01/23/2018    Other ascites [R18.8] 01/23/2018    Chronic acquired lymphedema [I89.0] 01/23/2018    Morbid obesity ([facility] [address] [E66.01] 04/12/2017       Plan:        Pelvic Abdominal Mass with ascites- Likely Ovarian CA  - CT abd/pelvis: large 13 cm pelvic mass which is probably an abnormal ovary. The presence of bilateral lymphadenopathy and large volume ascites concerning for malignancy. - Liver fxn WNL  - IR US Paracentesis yesterday   - US Transvaginal: Abnormal thickening of the endometrium.    - Endometrial biopsy should be considered.   - Heme/Onc and OB following  - Follow up with Dr. Ureña April 1/16/18 @1pm  -  elevated 151  - Morphine 2 mg Q4H  - Zofran PRN     JOCELINE likely 2/2 poor oral intake  - NS IVF bolus with albumin  - Maintenance IVF @125 mL/hr  - Cr improved to 1.75  - HbA1C 4.8     Chronic Lymphedema   - Restart Lasix home dose   - Wound consult     Tuan Juarez, MD  1/25/2018  7:34 AM     Attending Physician Statement    I have discussed the case of Lorenzo Hoyos, including pertinent history and exam findings with the resident. I have seen and examined the patient and the key elements of the encounter have been performed by me. I agree with the assessment, plan, and orders as documented by the resident. 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   Electronically signed by Dylan Somers MD on 1/25/2018 at 3:13 PM

## 2018-01-25 NOTE — PROGRESS NOTES
Spoke to [facility] resident and stated spoke to Dr Rodolfo Crowe and pt can be squeezed into their schedule tomorrow if discharged today

## 2018-01-25 NOTE — PROGRESS NOTES
34.2*   PLT  232  195     BMP:   Recent Labs      01/24/18   0713  01/25/18   0516   NA  134*  135   K  5.2  4.5   CO2  22  19*   BUN  53*  50*   CREATININE  2.53*  1.75*   LABGLOM  19*  29*   GLUCOSE  119*  103*     PT/INR:   Recent Labs      01/23/18   1555   PROTIME  10.6   INR  1.0      Ref. Range 1/23/2018 19:57    Latest Ref Range: <38 U/mL 151 (H)     IMAGING DATA:  CT abd  Impression   Large 13 cm pelvic mass which is probably an abnormal ovary.  The presence of   bilateral lymphadenopathy and large volume ascites concerning for malignancy.       The patient may benefit from diagnostic and therapeutic paracentesis. Further workup is advised.  Initial evaluation with pelvic sonogram may be   obtained. Primary Problem  JOCELINE (acute kidney injury) [facility])    Active Hospital Problems    Diagnosis Date Noted    JOCELINE (acute kidney injury) ([facility] [address] [N17.9] 01/23/2018    Other ascites [R18.8] 01/23/2018    Chronic acquired lymphedema [I89.0] 01/23/2018    Morbid obesity ([facility] [address] [E66.01] 04/12/2017     IMPRESSION:   1. Ovarian Mass: suspicious for neoplasm  2. Abd pain  3. JOCELINE  4. Ascitis: S/p paracentesis 6.2 L    RECOMMENDATIONS:  1. Await cytology from paracentesis  2. Scheduled follow up with GYn as o/p noted  3. If cytology negative she will need biopsy  4. Okay to discharge and further workup can be completed as outpatient      Discussed with patient and family and Nurse. Thank you for asking us to see this patient.     Inocente Martinez MD  Hematologist/Medical Oncologist  Cell: [phone]

## 2018-01-25 NOTE — DISCHARGE SUMMARY
90.5 01/25/2018    MCH 30.0 01/25/2018    MCHC 33.2 01/25/2018    RDW 14.2 01/25/2018     01/25/2018     BMP:    Lab Results   Component Value Date    GLUCOSE 103 01/25/2018     01/25/2018    K 4.5 01/25/2018     01/25/2018    CO2 19 01/25/2018    ANIONGAP 14 01/25/2018    BUN 50 01/25/2018    CREATININE 1.75 01/25/2018    BUNCRER NOT REPORTED 01/25/2018    CALCIUM 7.5 01/25/2018    LABGLOM 29 01/25/2018    GFRAA 35 01/25/2018    GFR      01/25/2018    GFR NOT REPORTED 01/25/2018     Radiology:    Ct Abdomen Pelvis Wo Iv Contrast Additional Contrast? None    Result Date: 1/23/2018  EXAMINATION: CT OF THE ABDOMEN AND PELVIS WITHOUT CONTRAST 1/23/2018 2:51 pm TECHNIQUE: CT of the abdomen and pelvis was performed without the administration of intravenous contrast. Multiplanar reformatted images are provided for review. Dose modulation, iterative reconstruction, and/or weight based adjustment of the mA/kV was utilized to reduce the radiation dose to as low as reasonably achievable. COMPARISON: None HISTORY: ORDERING SYSTEM PROVIDED HISTORY: abd pain, distention TECHNOLOGIST PROVIDED HISTORY: Additional Contrast?->None Ordering Physician Provided Reason for Exam: Pt is confused and altered mental status since yesterday. Acuity: Acute Type of Exam: Initial FINDINGS: Lower Chest: No significant abnormality at the lung bases. Organs: Exam is limited by the absence of intravenous contrast. No urinary tract calculus. The kidneys are symmetric and normal in size. No perinephric or periureteral stranding. No collecting system dilatation. No focal renal lesion. No focal liver lesion. There is cholelithiasis without evidence for acute cholecystitis. No biliary ductal dilatation. The spleen is normal in size without focal lesion. The pancreas and the adrenal glands are unremarkable. Mild diffuse left adrenal gland thickening without focal lesion. The right adrenal gland is unremarkable. GI/Bowel:  The appendix is normal.  No bowel obstruction. Pelvis: There is a soft tissue mass measuring 8.7 x 13 cm in the pelvis (series 2, image 165). This appears to be separate from the uterus. Peritoneum/Retroperitoneum: Large volume ascites is present. There are several mildly enlarged bilateral pelvic and retroperitoneal lymph nodes. A representative aortocaval lymph node measures 2 cm (series 2, image 116). Bones/Soft Tissues: There are lumbar Schmorl's nodes. Tiny umbilical hernia containing ascites. Large 13 cm pelvic mass which is probably an abnormal ovary. The presence of bilateral lymphadenopathy and large volume ascites concerning for malignancy. The patient may benefit from diagnostic and therapeutic paracentesis. Further workup is advised. Initial evaluation with pelvic sonogram may be obtained. Us Pelvis Complete    Result Date: 1/24/2018  EXAMINATION: PELVIC ULTRASOUND 1/24/2018 TECHNIQUE: Transabdominal and transvaginal pelvic ultrasound was performed with color doppler flow evaluation. COMPARISON: CT abdomen and pelvis January 23, 2018 HISTORY: ORDERING SYSTEM PROVIDED HISTORY: 13 cm pelvic mass TECHNOLOGIST PROVIDED HISTORY: Acuity: Acute Type of Exam: Initial FINDINGS: Measurements: Uterus:  12.4 x 6.3 x 8.0 cm Endometrial stripe:  16 mm Right Ovary:  Not measured Left Ovary:  Not measured Ultrasound Findings: Study is significantly limited due to patient body habitus. Large amount of free fluid is seen throughout the pelvis. The endometrium appears thickened measuring up to 16 mm. Within the posterior cul-de-sac there is a large heterogeneous masslike area which measures up to 10.8 cm. Ovaries are not visualized. Significantly limited study. Abnormal thickening of the endometrium. Endometrial biopsy should be considered. Large masslike area is indeterminate and may represent a large fibroid, cervical lesion, or adnexal lesion. MRI may be helpful for further characterization.      Xr Chest

## 2018-02-13 PROBLEM — R06.00 DYSPNEA: Status: ACTIVE | Noted: 2018-01-01

## 2018-02-13 NOTE — ED NOTES
Bed: 05  Expected date:   Expected time:   Means of arrival: [facility]ULAR [facility]  Comments:     Pavel Verdin RN  02/13/18 1079

## 2018-02-14 PROBLEM — R06.89 DYSPNEA AND RESPIRATORY ABNORMALITIES: Status: ACTIVE | Noted: 2018-01-01

## 2018-02-14 NOTE — CONSULTS
Today's Date: 2/14/2018  Patient Name: Fam Sawyer  Date of admission: 2/13/2018 12:41 PM  Patient's age: 57 y.o., 1955  Admission Dx: Dyspnea [R06.00]  Dyspnea [R06.00]    Reason for Consult: management recommendations  Requesting Physician: Daniel Briceno MD    CHIEF COMPLAINT:  Abdominal pain and shortness of breath    History Obtained From:  patient, electronic medical record    HISTORY OF PRESENT ILLNESS:      The patient is a 57 y.o.  female who is admitted to the hospital for chief complaints of shortness of breath and abdominal pain. Patient was recently admitted to the hospital with similar complaints. The patient at that time underwent CT abdomen pelvis which showed a large ovarian mass concerning for malignancy. Patient also underwent paracentesis. 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 Patient has not been able to make it to his oncologist office appointment. Patient presented to the ER yesterday with complaints of shortness of breath and abdominal pain. Denies fever or chills. Denies history of any previous blood clots. Past Medical History:   has a past medical history of Ascites; Cellulitis; and Headache. Past Surgical History:   has a past surgical history that includes Paracentesis and Tonsillectomy. Medications:    Prior to Admission medications    Medication Sig Start Date End Date Taking?  Authorizing Provider   furosemide (LASIX) 20 MG tablet TAKE 1 TABLET DAILY 11/2/17  Yes Azra Cortes CNP   potassium chloride (KLOR-CON M) 20 MEQ extended release tablet TAKE 1 TABLET DAILY 11/2/17  Yes Azra Cortes CNP   potassium chloride (KLOR-CON M) 20 MEQ TBCR extended release tablet take 1 tablet by mouth once daily 6/7/17   Azra Cortes CNP   levothyroxine (LEVOTHROID) 25 MCG tablet Take 1 tablet by mouth daily 5/24/17   Azra Cortes CNP     Current ascites is noted. The liver appears relatively small, however no cirrhotic features are demonstrated in the visualized area. The spleen does not appear enlarged, although partially visualized. No varices are appreciated. Soft Tissues/Bones: No acute bone or soft tissue abnormality. 1.   No evidence of pulmonary embolism or acute pulmonary abnormality. 2.  Large volume ascites is again demonstrated. Ir Us Guided Paracentesis    Result Date: 1/24/2018  PROCEDURE: ULTRASOUND GUIDED PARACENTESIS 1/24/2018 HISTORY: ORDERING SYSTEM PROVIDED HISTORY: ascites TECHNOLOGIST PROVIDED HISTORY: Reason for exam:->ascites Abdominal distention, ascites TECHNIQUE: This procedure was performed by Dr. Junior Brito. Informed consent was obtained after a detailed explanation of the procedure including risks, benefits, and alternatives. Universal protocol was followed. Ultrasound shows a moderate to large amount of ascites. The right abdomen was prepped and draped in sterile fashion and local anesthesia was achieved with 1% lidocaine. Using realtime ultrasound guidance a 5 [place] centesis catheter/needle was advanced into the peritoneal fluid. Ultrasound images show a safe tract and access into the fluid. Little to no fluid remains on completion. The catheter was removed and a sterile dressing applied. There are no immediate complications. The patient left the department in stable condition. A collected specimen was sent with the patient to the floor to send for any desired laboratory testing. FINDINGS: A total of 6.2 L of clear dark colored fluid was removed. Supplemental albumin was given intravenously. Successful ultrasound guided paracentesis.      IMPRESSION:     Primary Problem  Dyspnea    Active Hospital Problems    Diagnosis Date Noted    Dyspnea [R06.00] 02/13/2018    Hypothyroidism [E03.9] 05/24/2017    Morbid obesity ([address] [E66.01] 04/12/2017     Adnexal mass  Malignant ascites  Bilateral lower extremity

## 2018-02-14 NOTE — FLOWSHEET NOTE
Patient was tearful and said \"I'm scared\"  Her brother and sister were present  Patient was receptive to prayer and expressed gratitude  Patient to be transferred to floor  Spiritual care to follow as needed     02/13/18 2015   Encounter Summary   Services provided to: Patient   Referral/Consult From: [address] [place] [id number] Family members  (Brother and sister present)   Continue Visiting (2/13/18)   Complexity of Encounter Moderate   Length of Encounter 15 minutes   Spiritual Assessment Completed Yes   Routine   Type Initial   Assessment Approachable;Tearful;Fearful   Intervention Active listening;Explored feelings, thoughts, concerns;Explored coping resources;Prayer;Sustaining presence/ Ministry of presence; Discussed illness/injury and it's impact   Outcome Expressed gratitude;Expressed feelings/needs/concerns; Tearful;Receptive

## 2018-02-15 NOTE — FLOWSHEET NOTE
02/14/18 2025   Provider Notification   Reason for Communication New orders   Provider Name Dr. Juanita Johnson   Provider Notification Physician   Method of Communication Call   Response At bedside   Notification Time 2025     Dr. Juanita Johnson called and was informed the venous scans were back on pt and that they were abnormal. Dr. Juanita Johnson stated he would review the results in the AM.     Electronically signed by Dieter Patel RN on 2/14/2018 at 8:27 PM

## 2018-02-15 NOTE — CARE COORDINATION
CASE MANAGEMENT NOTE:    Admission Date:  2/13/2018 Chantal Guardado is a 57 y.o.  female    Admitted for : Dyspnea [R06.00]  Dyspnea [R06.00]    Met with:  Patient    PCP:  Dr. Magdaleno Purdy:  [facility]      Current Residence/ Living Arrangements:  independently at home             Current Services PTA:  No    Is patient agreeable to VNS: No    Freedom of choice provided: NA         VNS chosen:  NA    DME:  none    Home Oxygen: No    Nebulizer: No    Supplier: N/A    Potential Assistance Needed: No    SNF needed: No    Pharmacy:  [facility]       Does Patient want to use MEDS to BEDS? No    Family Members/Caregivers that pt would like involved in their care:    Yes    If yes, list name here:  Marleyisaías Otto    Transportation Provider:  Family                      Discharge Plan:  2/15/18 [facility] Pt is from home alone in a one story home she uses no dme and denies need for vns plan is to discharge to home with no needs will continue to follow for needs . //tv                Readmission Risk              Readmission Risk:        13.75       Age 72 or Greater:  0    Admitted from SNF or Requires Paid or Family Care:  0    Currently has CHF,COPD,ARF,CRI,or is on dialysis:  0    Takes more than 5 Prescription Medications:  0    Takes Digoxin,Insulin,Anticoagulants,Narcotics or ASA/Plavix:  [facility] in Past 12 Months:  10    On Disability:  0    Patient Considers own Health:  3.75          Electronically signed by:  Moira Ramirez RN on 2/15/2018 at 10:32 AM

## 2018-02-15 NOTE — PLAN OF CARE
Problem: Falls - Risk of  Goal: Absence of falls  Outcome: Met This Shift      Problem: Risk for Impaired Skin Integrity  Goal: Tissue integrity - skin and mucous membranes  Structural intactness and normal physiological function of skin and  mucous membranes.    Outcome: Ongoing      Problem: Fluid Volume - Imbalance:  Goal: Absence of imbalanced fluid volume signs and symptoms  Absence of imbalanced fluid volume signs and symptoms   Outcome: Ongoing  Much edema of BLE; pedal,posterior tibial pulses per doppler,feet warm

## 2018-02-15 NOTE — FLOWSHEET NOTE
Pt was found asleep at 0405; at 0414 cardiac monitor showed sudden short onset tachy rate up to 180; pt was awakened with not s/s;skin warm,dry;denies SOB;strip posted

## 2018-02-15 NOTE — TELEPHONE ENCOUNTER
Spoke with Dr. Lucrecia Renteria via telephone, notified pt scheduled for Dr. Xiao Frances f/u tomorrow @ [facility]. Inquired if paracentesis may be arranged & pt dc'd prior to f/u. Dr. Lucrecia Renteria stated most likely paracentesis to be completed tomorrow r/t pt currently on lovenox. Dr. Lucrecia Renteria stated will speak with Dr. Xiao Frances r/t arranging outpatient orders prn. Will continue to follow.

## 2018-02-16 NOTE — OP NOTE
Brief Postoperative Note    Maury Segura  YOB: 1955  [id number]    Pre-operative Diagnosis: ovarian ca  Post-operative Diagnosis: Same    Procedure: port placement, paracentesis    Anesthesia: Local   Surgeons/Assistants: Ned Olivier MD     Estimated Blood Loss: minimal    Complications: none immediate          Electronically signed by Ned Olivier MD on 2/16/2018 at 3:09 PM

## 2018-02-16 NOTE — PROGRESS NOTES
Temp 97.9 °F (36.6 °C) (Oral)   Resp 19   Ht 5' 3\" (1.6 m)   Wt (!) 366 lb 13.5 oz (166.4 kg)   SpO2 97%   BMI 64.98 kg/m²   General appearance - well appearing, no in pain or distress  Mental status - AAO X3  Eyes - pupils equal and reactive, extraocular eye movements intact  Mouth - mucous membranes moist, pharynx normal without lesions  Neck - supple, no significant adenopathy  Lymphatics - no palpable lymphadenopathy, no hepatosplenomegaly  Chest - clear to auscultation, no wheezes, rales or rhonchi, symmetric air entry  Heart - normal rate, regular rhythm, normal S1, S2, no murmurs  Abdomen - soft, nontender, mildly distended. Neurological - alert, oriented, normal speech, no focal findings or movement disorder noted  Extremities - peripheral pulses normal, no pedal edema, no clubbing or cyanosis  Skin - normal coloration and turgor, no rashes, no suspicious skin lesions noted         Data:    No intake/output data recorded. In: 600 [P.O.:600]  Out: 450 [Urine:450]    CBC:   Recent Labs      02/14/18   0415  02/15/18   0728  02/16/18   0540   WBC  11.1*  12.6*  12.2*   HGB  11.2*  12.1  11.8*   PLT  346  284  376     BMP:    Recent Labs      02/14/18   0415  02/15/18   0728  02/16/18   0540   NA  134*  137  135   K  4.2  4.6  4.6   CL  100  102  101   CO2  24  21  21   BUN  31*  32*  34*   CREATININE  1.17*  1.19*  1.19*   GLUCOSE  104*  115*  112*     Hepatic: No results for input(s): AST, ALT, ALB, BILITOT, ALKPHOS in the last 72 hours. INR: No results for input(s): INR in the last 72 hours. PTT:No results for input(s): PTT in the last 72 hours. Results for orders placed or performed during the hospital encounter of 02/13/18   Urine culture clean catch   Result Value Ref Range    Specimen Description       . CLEAN CATCH URINE Performed at [facility]: [facility]    Specimen Description  [place].  [address] [phone]     Special Requests NOT REPORTED     Culture CULTURE IN PROGRESS     Culture       Performed at [facility], [address] [place] [address] [phone]    Status Pending    TROP/MYOGLOBIN   Result Value Ref Range    Troponin T <0.03 <0.03 ng/mL    Troponin Interp          Myoglobin 58 25 - 58 ng/mL   CBC Auto Differential   Result Value Ref Range    WBC 13.6 (H) 3.5 - 11.0 k/uL    RBC 4.53 4.0 - 5.2 m/uL    Hemoglobin 13.2 12.0 - 16.0 g/dL    Hematocrit 39.8 36 - 46 %    MCV 88.0 80 - 100 fL    MCH 29.1 26 - 34 pg    MCHC 33.0 31 - 37 g/dL    RDW 13.9 11.5 - 14.9 %    Platelets 149 446 - 464 k/uL    MPV 7.4 6.0 - 12.0 fL    NRBC Automated NOT REPORTED per 100 WBC    Differential Type NOT REPORTED     Seg Neutrophils 79 (H) 36 - 66 %    Lymphocytes 10 (L) 24 - 44 %    Monocytes 9 (H) 1 - 7 %    Eosinophils % 1 0 - 4 %    Basophils 1 0 - 2 %    Immature Granulocytes NOT REPORTED 0 %    Segs Absolute 10.80 (H) 1.3 - 9.1 k/uL    Absolute Lymph # 1.30 1.0 - 4.8 k/uL    Absolute Mono # 1.20 0.1 - 1.3 k/uL    Absolute Eos # 0.20 0.0 - 0.4 k/uL    Basophils # 0.10 0.0 - 0.2 k/uL    Absolute Immature Granulocyte NOT REPORTED 0.00 - 0.30 k/uL    WBC Morphology NOT REPORTED     RBC Morphology NOT REPORTED     Platelet Estimate NOT REPORTED    Basic Metabolic Panel   Result Value Ref Range    Glucose 99 70 - 99 mg/dL    BUN 29 (H) 8 - 23 mg/dL    CREATININE 1.20 (H) 0.50 - 0.90 mg/dL    Bun/Cre Ratio NOT REPORTED 9 - 20    Calcium 8.5 (L) 8.6 - 10.4 mg/dL    Sodium 136 135 - 144 mmol/L    Potassium 4.4 3.7 - 5.3 mmol/L    Chloride 98 98 - 107 mmol/L    CO2 24 20 - 31 mmol/L    Anion Gap 14 9 - 17 mmol/L    GFR Non-African American 45 (L) >60 mL/min    GFR  55 (L) >60 mL/min    GFR Comment          GFR Staging NOT REPORTED    D-Dimer, Quantitative   Result Value Ref Range    D-Dimer, Quant 12.43 (H) <0.50 mg/L FEU   CBC Auto Differential   Result Value Ref Range    WBC 11.1 (H) 3.5 - 11.0 k/uL    RBC 3.68 (L) 4.0 - 5.2 m/uL    Hemoglobin 11.2 (L) 12.0 - 16.0 g/dL    Hematocrit 32.1 (L) 36 - 46 %    MCV 87.5 80 - 100 fL    MCH 30.5 26 - 34 pg    MCHC 34.8 31 - 37 g/dL    RDW 14.0 11.5 - 14.9 %    Platelets 662 423 - 103 k/uL    MPV 7.6 6.0 - 12.0 fL    NRBC Automated NOT REPORTED per 100 WBC    Differential Type NOT REPORTED     Seg Neutrophils 70 (H) 36 - 66 %    Lymphocytes 15 (L) 24 - 44 %    Monocytes 10 (H) 1 - 7 %    Eosinophils % 4 0 - 4 %    Basophils 1 0 - 2 %    Immature Granulocytes NOT REPORTED 0 %    Segs Absolute 7.90 1.3 - 9.1 k/uL    Absolute Lymph # 1.60 1.0 - 4.8 k/uL    Absolute Mono # 1.10 0.1 - 1.3 k/uL    Absolute Eos # 0.40 0.0 - 0.4 k/uL    Basophils # 0.10 0.0 - 0.2 k/uL    Absolute Immature Granulocyte NOT REPORTED 0.00 - 0.30 k/uL    WBC Morphology NOT REPORTED     RBC Morphology NOT REPORTED     Platelet Estimate NOT REPORTED    Basic Metabolic Panel   Result Value Ref Range    Glucose 104 (H) 70 - 99 mg/dL    BUN 31 (H) 8 - 23 mg/dL    CREATININE 1.17 (H) 0.50 - 0.90 mg/dL    Bun/Cre Ratio NOT REPORTED 9 - 20    Calcium 7.9 (L) 8.6 - 10.4 mg/dL    Sodium 134 (L) 135 - 144 mmol/L    Potassium 4.2 3.7 - 5.3 mmol/L    Chloride 100 98 - 107 mmol/L    CO2 24 20 - 31 mmol/L    Anion Gap 10 9 - 17 mmol/L    GFR Non-African American 47 (L) >60 mL/min    GFR  56 (L) >60 mL/min    GFR Comment          GFR Staging NOT REPORTED    UA w/Reflex Culture   Result Value Ref Range    Color, UA DARK YELLOW (A) YEL    Turbidity UA TURBID (A) CLEAR    Glucose, Ur NEGATIVE NEG    Bilirubin Urine (A) NEG     Presumptive positive. Unable to confirm due to unavailability of reagent.     Ketones, Urine NEGATIVE NEG    Specific Gravity, UA 1.073 (H) 1.000 - 1.030    Urine Hgb MOD (A) NEG    pH, UA 6.0 5.0 - 8.0    Protein, UA 1+ (A) NEG    Urobilinogen, Urine Normal NORM    Nitrite, Urine POSITIVE (A) NEG    Leukocyte Esterase, Urine SMALL (A) NEG    Urinalysis Comments NOT REPORTED    Prealbumin   Result Value Ref Range    Prealbumin 6.0 (L) 20 - Hemoglobin 11.8 (L) 12.0 - 16.0 g/dL    Hematocrit 34.8 (L) 36 - 46 %    MCV 87.5 80 - 100 fL    MCH 29.6 26 - 34 pg    MCHC 33.9 31 - 37 g/dL    RDW 14.0 11.5 - 14.9 %    Platelets 304 074 - 484 k/uL    MPV 7.8 6.0 - 12.0 fL    NRBC Automated NOT REPORTED per 100 WBC   EKG 12 Lead   Result Value Ref Range    Ventricular Rate 106 BPM    Atrial Rate 106 BPM    P-R Interval 138 ms    QRS Duration 78 ms    Q-T Interval 336 ms    QTc Calculation (Bazett) 446 ms    P Axis 58 degrees    R Axis -2 degrees    T Axis 53 degrees         Ct Abdomen Pelvis Wo Iv Contrast Additional Contrast? None    Result Date: 1/23/2018  EXAMINATION: CT OF THE ABDOMEN AND PELVIS WITHOUT CONTRAST 1/23/2018 2:51 pm TECHNIQUE: CT of the abdomen and pelvis was performed without the administration of intravenous contrast. Multiplanar reformatted images are provided for review. Dose modulation, iterative reconstruction, and/or weight based adjustment of the mA/kV was utilized to reduce the radiation dose to as low as reasonably achievable. COMPARISON: None HISTORY: ORDERING SYSTEM PROVIDED HISTORY: abd pain, distention TECHNOLOGIST PROVIDED HISTORY: Additional Contrast?->None Ordering Physician Provided Reason for Exam: Pt is confused and altered mental status since yesterday. Acuity: Acute Type of Exam: Initial FINDINGS: Lower Chest: No significant abnormality at the lung bases. Organs: Exam is limited by the absence of intravenous contrast. No urinary tract calculus. The kidneys are symmetric and normal in size. No perinephric or periureteral stranding. No collecting system dilatation. No focal renal lesion. No focal liver lesion. There is cholelithiasis without evidence for acute cholecystitis. No biliary ductal dilatation. The spleen is normal in size without focal lesion. The pancreas and the adrenal glands are unremarkable. Mild diffuse left adrenal gland thickening without focal lesion. The right adrenal gland is unremarkable. mm Right Ovary:  Not measured Left Ovary:  Not measured Ultrasound Findings: Study is significantly limited due to patient body habitus. Large amount of free fluid is seen throughout the pelvis. The endometrium appears thickened measuring up to 16 mm. Within the posterior cul-de-sac there is a large heterogeneous masslike area which measures up to 10.8 cm. Ovaries are not visualized. Significantly limited study. Abnormal thickening of the endometrium. Endometrial biopsy should be considered. Large masslike area is indeterminate and may represent a large fibroid, cervical lesion, or adnexal lesion. MRI may be helpful for further characterization. Xr Chest Portable    Result Date: 1/23/2018  EXAMINATION: SINGLE VIEW OF THE CHEST 1/23/2018 12:30 pm COMPARISON: None. HISTORY: ORDERING SYSTEM PROVIDED HISTORY: tachycardia, abd pain TECHNOLOGIST PROVIDED HISTORY: Reason for exam:->tachycardia, abd pain Ordering Physician Provided Reason for Exam: Possible free air/tachycardia/abd pain Acuity: Acute Type of Exam: Initial Additional signs and symptoms: Possible free air/tachycardia/abd pain FINDINGS: Overlying ECG monitor snaps. Cardiac silhouette nonenlarged. Mediastinal structures midline. Mild elevation right hemidiaphragm and somewhat low lung volumes. No consolidation or large pleural effusion. Slight motion left hemidiaphragm but no definite subdiaphragmatic free air. DJD spine. No subdiaphragmatic free air. No consolidation or sizable pleural effusion. Ct Chest Pulmonary Embolism W Contrast    Result Date: 2/14/2018  EXAMINATION: CTA OF THE CHEST 2/14/2018 12:19 pm TECHNIQUE: CTA of the chest was performed after the administration of intravenous contrast.  Multiplanar reformatted images are provided for review. MIP images are provided for review.  Dose modulation, iterative reconstruction, and/or weight based adjustment of the mA/kV was utilized to reduce the radiation dose to as low as reasonably Leslie [facility]  Nurse  Practitioner  Procedure Type of Study:   Veins: Lower Extremities DVT Study, Venous Scan Lower Bilateral.  Indications for Study:Elevated D-Dimer. Patient Status: In Patient. Technical Quality:Poor visualization. Limitation reason:Body habitus, open wounds. Conclusions   Summary   Simultaneous real time imaging utilizing B-Mode, color doppler and  spectral waveform analysis was performed on the bilateral lower  extremities for venous examination of the deep and superficial systems. Findings are:   Right:  Technically limited study as stated below. Acute superficial venous thrombosis identified in the great saphenous  vein. Left:  Technically limited study as stated below. Acute deep venous thrombosis identified in the common femoral, femoral and  profunda veins. Signature   ----------------------------------------------------------------  Electronically signed by Melvin Barlow(Sonographer) on  02/14/2018 06:35 PM  ----------------------------------------------------------------   ----------------------------------------------------------------  Electronically signed by Cm Sunshine(Interpreting  physician) on 02/14/2018 07:39 PM  ----------------------------------------------------------------  Findings:   Right Impression:                    Left Impression:  The common femoral and femoral vein  The common femoral, femoral and  from proximal to mid portions        profunda veins demonstrates no  demonstrate normal compressibility   compressibility. and augmentation. Thrombus appears acute based on                                       B-Mode image evaluation. Distal femoral, popliteal,  grastrocnemius, small saphenous,     Mid to distal femoral, popliteal,  posterior tibial, and peroneal veins gastrocnemius, small saphenous,  are not visualized due to body       posterior tibial, and peroneal veins  habitus.                              not visualized due to body habitus. The great saphenous vein             Normal compressibility of the great  demonstrates no compressibility. saphenous vein. Thrombus appears acute based on  B-Mode image evaluation. The saphenofemoral junction  demonstrates no compressibility. Velocities are measured in cm/s ; Diameters are measured in mm Right Lower Extremities DVT Study Measurements Right 2D Measurements +----------------------------------+----------+---------------+------------+ ! Location                          ! Visualized! Compressibility! Thrombosis  ! +----------------------------------+----------+---------------+------------+ ! Common Femoral                    !Yes       ! Yes            ! None        ! +----------------------------------+----------+---------------+------------+ ! Prox Femoral                      !Yes       ! Yes            ! None        ! +----------------------------------+----------+---------------+------------+ ! Mid Femoral                       !Yes       ! Yes            ! None        ! +----------------------------------+----------+---------------+------------+ ! Dist Femoral                      !No        !               !            ! +----------------------------------+----------+---------------+------------+ ! Deep Femoral                      !No        !               !            ! +----------------------------------+----------+---------------+------------+ ! Popliteal                         !No        !               !            ! +----------------------------------+----------+---------------+------------+ ! Sapheno Femoral Junction          ! Yes       ! Yes            ! None        ! +----------------------------------+----------+---------------+------------+ ! PTV                               ! No        !               !            ! +----------------------------------+----------+---------------+------------+ ! Peroneal                          !No        !               !            ! Malignant ascites, non-small cell  Adnexal mass likely primary ovarian carcinoma  Lower extremity DVT  Bilateral lower extremity lymphedema  Morbid obesity      Plan     Personally reviewed the results of lab workup and imaging studies with the patient. Agree with anticoagulation in light of proximal vein DVT. Pt scheduled for ultrasound-guided paracentesis and also port placement per interventional radiology. awaitin c diff results   D/w Dr Luc Verma and updated him on plan   Continue supportive care.

## 2018-02-16 NOTE — PROGRESS NOTES
Pt has NPO sign on her door and NPO order, however, she was delivered a breakfast tray and ate her full breakfast.  IR notified, they will check with the physician and call back regarding her procedures.     Electronically signed by Marcus Reynolds RN on 2/16/2018 at 9:21 AM

## 2018-02-16 NOTE — PROGRESS NOTES
PULMONARY PROGRESS NOTE:    REASON FOR VISIT: dyspnea, DVT, malignant ascites  Interval History:    Shortness of Breath: +  Cough: no  Sputum: no          Hemoptysis: no  Chest Pain: no  Fever: no                   Swelling Feet: no  Headache: no                                           Nausea, Emesis, Abdominal Pain: no  Diarrhea: no         Constipation: no    Events since last visit: none    PAST MEDICAL HISTORY:      Scheduled Meds:   metroNIDAZOLE  500 mg Oral 3 times per day    lactobacillus  1 capsule Oral BID    miconazole   Topical BID    sodium chloride flush  10 mL Intravenous 2 times per day    potassium chloride  20 mEq Oral Daily with breakfast    enoxaparin  1 mg/kg Subcutaneous BID     Continuous Infusions:   PRN Meds:sodium chloride flush, sodium chloride flush, acetaminophen, fentanNYL        PHYSICAL EXAMINATION:  /87   Pulse 98   Temp 97.9 °F (36.6 °C) (Oral)   Resp 19   Ht 5' 3\" (1.6 m)   Wt (!) 366 lb 13.5 oz (166.4 kg)   SpO2 97%   BMI 64.98 kg/m²     General : Awake, alert, oriented to time, place, and person  Neck  supple, no lymphadenopathy, JVD not raised  Heart  regular rhythm, S1 and S2 normal; no additional sounds heard  Lungs  Air Entry- fair bilaterally; breath sounds : vesicular;   rales/crackles - absent  Abdomen  soft, no tenderness, distended  Upper Extremities  - no cyanosis, mottling; edema : absent  Lower Extremities: no cyanosis, mottling; edema : ++    Current Laboratory, Radiologic, Microbiologic, and Diagnostic studies reviewed  Data ReviewCBC:   Recent Labs      02/14/18   0415  02/15/18   0728  02/16/18   0540   WBC  11.1*  12.6*  12.2*   RBC  3.68*  4.13  3.97*   HGB  11.2*  12.1  11.8*   HCT  32.1*  35.8*  34.8*   PLT  346  284  376     BMP:   Recent Labs      02/14/18   0415  02/15/18   0728  02/16/18   0540   GLUCOSE  104*  115*  112*   NA  134*  137  135   K  4.2  4.6  4.6   BUN  31*  32*  34*   CREATININE  1.17*  1.19*  1.19*   CALCIUM

## 2018-02-16 NOTE — PROGRESS NOTES
RN spoke with Dr. Avery Beltran in regards to patient complaining of diarrhea that she had multiple times during day shift. Pt is requesting imodium at this time. RN spoke with Dr. Avery Beltran and updated that patient is not on any antibiotics and that this started today. At this time received order for flagyl 500 mg PO BID and to send for cdiff.

## 2018-02-16 NOTE — PLAN OF CARE
Problem: Falls - Risk of  Goal: Absence of falls  Outcome: Met This Shift  No falls this shift, fall precautions in place. Problem: Risk for Impaired Skin Integrity  Goal: Tissue integrity - skin and mucous membranes  Structural intactness and normal physiological function of skin and  mucous membranes.    Outcome: Ongoing      Problem: Fluid Volume - Imbalance:  Goal: Absence of imbalanced fluid volume signs and symptoms  Absence of imbalanced fluid volume signs and symptoms   Outcome: Ongoing      Problem: Pain:  Goal: Pain level will decrease  Pain level will decrease   Outcome: Met This Shift

## 2018-02-16 NOTE — PROGRESS NOTES
Date:                           2/15/2018  Patient name:           Woody Anglin  Date of admission:  2/13/2018 12:41 PM  MRN:   [id number]  YOB: 1955  PCP:                           Morteza Presley CNP          Subjective:     Patient complains of abdominal distention. Complains of shortness of breath. Diagnosed with lower extremity DVT. Patient is on anticoagulation. Denies any bleeding. Complains of weakness. HISTORY OF PRESENT ILLNESS:       The patient is a 57 y.o.  female who is admitted to the hospital for chief complaints of shortness of breath and abdominal pain. Patient was recently admitted to the hospital with similar complaints. The patient at that time underwent CT abdomen pelvis which showed a large ovarian mass concerning for malignancy. Patient also underwent paracentesis. 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 Patient has not been able to make it to his oncologist office appointment. Patient presented to the ER yesterday with complaints of shortness of breath and abdominal pain. Denies fever or chills. Denies history of any previous blood clots. REVIEW OF SYSTEMS:  General: no fever or night sweats, Weight is stable. ENT: No double or blurred vision, no hearing problem, no dysphagia or sore throat   Respiratory: No chest pain, no shortness of breath, no cough or hemoptysis. Cardiovascular: Denies chest pain, PND or orthopnea. No L E swelling or palpitations. Gastrointestinal:    No nausea or vomiting, abdominal pain, diarrhea or constipation. Genitourinary: Denies dysuria, hematuria, frequency, urgency or incontinence. Neurological: Denies headaches, decreased LOC, no sensory or motor focal deficits. Musculoskeletal:  No arthralgia no back pain or joint swelling. Skin: There are no rashes or bleeding.   Psych: Denies hallucinations or intentions to harm self        Objective:     Vitals: /78   Pulse 109   Temp 97.7 °F (36.5 °C) (Oral)   Resp 16   Ht 5' 3\" (1.6 m)   Wt (!) 356 lb 14.8 oz (161.9 kg)   SpO2 100%   BMI 63.23 kg/m²   General appearance - well appearing, no in pain or distress  Mental status - AAO X3  Eyes - pupils equal and reactive, extraocular eye movements intact  Mouth - mucous membranes moist, pharynx normal without lesions  Neck - supple, no significant adenopathy  Lymphatics - no palpable lymphadenopathy, no hepatosplenomegaly  Chest - clear to auscultation, no wheezes, rales or rhonchi, symmetric air entry  Heart - normal rate, regular rhythm, normal S1, S2, no murmurs  Abdomen - soft, nontender, mildly distended. Neurological - alert, oriented, normal speech, no focal findings or movement disorder noted  Extremities - peripheral pulses normal, no pedal edema, no clubbing or cyanosis  Skin - normal coloration and turgor, no rashes, no suspicious skin lesions noted         Data:    I/O this shift:  In: 600 [P.O.:600]  Out: -   In: 840 [P.O.:840]  Out: -     CBC:   Recent Labs      02/13/18   1500  02/14/18   0415  02/15/18   0728   WBC  13.6*  11.1*  12.6*   HGB  13.2  11.2*  12.1   PLT  378  346  284     BMP:    Recent Labs      02/13/18   1500  02/14/18   0415  02/15/18   0728   NA  136  134*  137   K  4.4  4.2  4.6   CL  98  100  102   CO2  24  24  21   BUN  29*  31*  32*   CREATININE  1.20*  1.17*  1.19*   GLUCOSE  99  104*  115*     Hepatic: No results for input(s): AST, ALT, ALB, BILITOT, ALKPHOS in the last 72 hours. INR: No results for input(s): INR in the last 72 hours. PTT:No results for input(s): PTT in the last 72 hours.     Results for orders placed or performed during the hospital encounter of 02/13/18   TROP/MYOGLOBIN   Result Value Ref Range    Troponin T <0.03 <0.03 ng/mL    Troponin Interp          Myoglobin 58 25 - 58 ng/mL   CBC Auto Differential   Result Value Ref Range    WBC 13.6 (H) 3.5 - 11.0 k/uL    RBC 4.53 4.0 - 5.2 m/uL    Hemoglobin 13.2 12.0 - 16.0 g/dL    Hematocrit 39.8 36 - 46 %    MCV 88.0 80 - 100 fL    MCH 29.1 26 - 34 pg    MCHC 33.0 31 - 37 g/dL    RDW 13.9 11.5 - 14.9 %    Platelets 938 630 - 758 k/uL    MPV 7.4 6.0 - 12.0 fL    NRBC Automated NOT REPORTED per 100 WBC    Differential Type NOT REPORTED     Seg Neutrophils 79 (H) 36 - 66 %    Lymphocytes 10 (L) 24 - 44 %    Monocytes 9 (H) 1 - 7 %    Eosinophils % 1 0 - 4 %    Basophils 1 0 - 2 %    Immature Granulocytes NOT REPORTED 0 %    Segs Absolute 10.80 (H) 1.3 - 9.1 k/uL    Absolute Lymph # 1.30 1.0 - 4.8 k/uL    Absolute Mono # 1.20 0.1 - 1.3 k/uL    Absolute Eos # 0.20 0.0 - 0.4 k/uL    Basophils # 0.10 0.0 - 0.2 k/uL    Absolute Immature Granulocyte NOT REPORTED 0.00 - 0.30 k/uL    WBC Morphology NOT REPORTED     RBC Morphology NOT REPORTED     Platelet Estimate NOT REPORTED    Basic Metabolic Panel   Result Value Ref Range    Glucose 99 70 - 99 mg/dL    BUN 29 (H) 8 - 23 mg/dL    CREATININE 1.20 (H) 0.50 - 0.90 mg/dL    Bun/Cre Ratio NOT REPORTED 9 - 20    Calcium 8.5 (L) 8.6 - 10.4 mg/dL    Sodium 136 135 - 144 mmol/L    Potassium 4.4 3.7 - 5.3 mmol/L    Chloride 98 98 - 107 mmol/L    CO2 24 20 - 31 mmol/L    Anion Gap 14 9 - 17 mmol/L    GFR Non-African American 45 (L) >60 mL/min    GFR  55 (L) >60 mL/min    GFR Comment          GFR Staging NOT REPORTED    D-Dimer, Quantitative   Result Value Ref Range    D-Dimer, Quant 12.43 (H) <0.50 mg/L FEU   CBC Auto Differential   Result Value Ref Range    WBC 11.1 (H) 3.5 - 11.0 k/uL    RBC 3.68 (L) 4.0 - 5.2 m/uL    Hemoglobin 11.2 (L) 12.0 - 16.0 g/dL    Hematocrit 32.1 (L) 36 - 46 %    MCV 87.5 80 - 100 fL    MCH 30.5 26 - 34 pg    MCHC 34.8 31 - 37 g/dL    RDW 14.0 11.5 - 14.9 %    Platelets 892 377 - 897 k/uL    MPV 7.6 6.0 - 12.0 fL    NRBC Automated NOT REPORTED per 100 WBC    Differential Type NOT REPORTED     Seg Neutrophils 70 (H) 36 - 66 %    Lymphocytes Pt is confused and altered mental status since yesterday. Acuity: Acute Type of Exam: Initial FINDINGS: Lower Chest: No significant abnormality at the lung bases. Organs: Exam is limited by the absence of intravenous contrast. No urinary tract calculus. The kidneys are symmetric and normal in size. No perinephric or periureteral stranding. No collecting system dilatation. No focal renal lesion. No focal liver lesion. There is cholelithiasis without evidence for acute cholecystitis. No biliary ductal dilatation. The spleen is normal in size without focal lesion. The pancreas and the adrenal glands are unremarkable. Mild diffuse left adrenal gland thickening without focal lesion. The right adrenal gland is unremarkable. GI/Bowel: The appendix is normal.  No bowel obstruction. Pelvis: There is a soft tissue mass measuring 8.7 x 13 cm in the pelvis (series 2, image 165). This appears to be separate from the uterus. Peritoneum/Retroperitoneum: Large volume ascites is present. There are several mildly enlarged bilateral pelvic and retroperitoneal lymph nodes. A representative aortocaval lymph node measures 2 cm (series 2, image 116). Bones/Soft Tissues: There are lumbar Schmorl's nodes. Tiny umbilical hernia containing ascites. Large 13 cm pelvic mass which is probably an abnormal ovary. The presence of bilateral lymphadenopathy and large volume ascites concerning for malignancy. The patient may benefit from diagnostic and therapeutic paracentesis. Further workup is advised. Initial evaluation with pelvic sonogram may be obtained.      Xr Chest Standard (2 Vw)    Result Date: 2/13/2018  EXAMINATION: TWO VIEWS OF THE CHEST 2/13/2018 1:26 pm COMPARISON: 23 January 2018 HISTORY: ORDERING SYSTEM PROVIDED HISTORY: Chest Pain TECHNOLOGIST PROVIDED HISTORY: Reason for exam:->Chest Pain Ordering Physician Provided Reason for Exam: sob, chest pain/fluid Acuity: Unknown Type of Exam: Unknown FINDINGS: Heart size is normal.  No vascular congestion, focal consolidation, effusion, or pneumothorax is noted. Osseous and mediastinal structures are stable and age-appropriate. No evidence of acute cardiopulmonary disease. Prior elevation of the right hemidiaphragm is not re- demonstrated. Us Pelvis Complete    Result Date: 1/24/2018  EXAMINATION: PELVIC ULTRASOUND 1/24/2018 TECHNIQUE: Transabdominal and transvaginal pelvic ultrasound was performed with color doppler flow evaluation. COMPARISON: CT abdomen and pelvis January 23, 2018 HISTORY: ORDERING SYSTEM PROVIDED HISTORY: 13 cm pelvic mass TECHNOLOGIST PROVIDED HISTORY: Acuity: Acute Type of Exam: Initial FINDINGS: Measurements: Uterus:  12.4 x 6.3 x 8.0 cm Endometrial stripe:  16 mm Right Ovary:  Not measured Left Ovary:  Not measured Ultrasound Findings: Study is significantly limited due to patient body habitus. Large amount of free fluid is seen throughout the pelvis. The endometrium appears thickened measuring up to 16 mm. Within the posterior cul-de-sac there is a large heterogeneous masslike area which measures up to 10.8 cm. Ovaries are not visualized. Significantly limited study. Abnormal thickening of the endometrium. Endometrial biopsy should be considered. Large masslike area is indeterminate and may represent a large fibroid, cervical lesion, or adnexal lesion. MRI may be helpful for further characterization. Xr Chest Portable    Result Date: 1/23/2018  EXAMINATION: SINGLE VIEW OF THE CHEST 1/23/2018 12:30 pm COMPARISON: None. HISTORY: ORDERING SYSTEM PROVIDED HISTORY: tachycardia, abd pain TECHNOLOGIST PROVIDED HISTORY: Reason for exam:->tachycardia, abd pain Ordering Physician Provided Reason for Exam: Possible free air/tachycardia/abd pain Acuity: Acute Type of Exam: Initial Additional signs and symptoms: Possible free air/tachycardia/abd pain FINDINGS: Overlying ECG monitor snaps. Cardiac silhouette nonenlarged.   Mediastinal structures ----------------------------------------------------------------  Findings:   Right Impression:                    Left Impression:  The common femoral and femoral vein  The common femoral, femoral and  from proximal to mid portions        profunda veins demonstrates no  demonstrate normal compressibility   compressibility. and augmentation. Thrombus appears acute based on                                       B-Mode image evaluation. Distal femoral, popliteal,  grastrocnemius, small saphenous,     Mid to distal femoral, popliteal,  posterior tibial, and peroneal veins gastrocnemius, small saphenous,  are not visualized due to body       posterior tibial, and peroneal veins  habitus. not visualized due to body habitus. The great saphenous vein             Normal compressibility of the great  demonstrates no compressibility. saphenous vein. Thrombus appears acute based on  B-Mode image evaluation. The saphenofemoral junction  demonstrates no compressibility. Velocities are measured in cm/s ; Diameters are measured in mm Right Lower Extremities DVT Study Measurements Right 2D Measurements +----------------------------------+----------+---------------+------------+ ! Location                          ! Visualized! Compressibility! Thrombosis  ! +----------------------------------+----------+---------------+------------+ ! Common Femoral                    !Yes       ! Yes            ! None        ! +----------------------------------+----------+---------------+------------+ ! Prox Femoral                      !Yes       ! Yes            ! None        ! +----------------------------------+----------+---------------+------------+ ! Mid Femoral                       !Yes       ! Yes            ! None        ! +----------------------------------+----------+---------------+------------+ ! Dist Femoral                      !No        !               !            ! +----------------------------------+----------+---------------+------------+ ! Deep Femoral                      !No        !               !            ! +----------------------------------+----------+---------------+------------+ ! Popliteal                         !No        !               !            ! +----------------------------------+----------+---------------+------------+ ! Sapheno Femoral Junction          ! Yes       ! Yes            ! None        ! +----------------------------------+----------+---------------+------------+ ! PTV                               ! No        !               !            ! +----------------------------------+----------+---------------+------------+ ! Peroneal                          !No        !               !            ! +----------------------------------+----------+---------------+------------+ ! Gastroc                           ! No        !               !            ! +----------------------------------+----------+---------------+------------+ ! GSV Thigh                         ! Yes       ! No             !Heterogenous! +----------------------------------+----------+---------------+------------+ ! GSV Knee                          ! Yes       ! No             !Heterogenous! +----------------------------------+----------+---------------+------------+ ! GSV Ankle                         ! Yes       ! No             !Heterogenous! +----------------------------------+----------+---------------+------------+ ! SSV                               ! No        !               !            ! +----------------------------------+----------+---------------+------------+ Right Doppler Measurements +----------------------------+------+------+-------------------------------+ ! Location                    ! Signal!Reflux! Reflux (msec)                  ! +----------------------------+------+------+-------------------------------+ ! Common Femoral              !Phasic!      !

## 2018-02-17 NOTE — PROGRESS NOTES
swelling. Skin: There are no rashes or bleeding. Psych: Denies hallucinations or intentions to harm self        Objective:     Vitals: BP (!) 111/56   Pulse 90   Temp 97.7 °F (36.5 °C) (Oral)   Resp 16   Ht 5' 3\" (1.6 m)   Wt (!) 366 lb 13.5 oz (166.4 kg)   SpO2 100%   BMI 64.98 kg/m²   General appearance - well appearing, no in pain or distress  Mental status - AAO X3  Eyes - pupils equal and reactive, extraocular eye movements intact  Mouth - mucous membranes moist, pharynx normal without lesions  Neck - supple, no significant adenopathy  Lymphatics - no palpable lymphadenopathy, no hepatosplenomegaly  Chest - clear to auscultation, no wheezes, rales or rhonchi, symmetric air entry  Heart - normal rate, regular rhythm, normal S1, S2, no murmurs  Abdomen - soft, nontender, mildly distended. Neurological - alert, oriented, normal speech, no focal findings or movement disorder noted  Extremities - chronic bilateral lymphedema  Skin - normal coloration and turgor, no rashes, no suspicious skin lesions noted         Data:    No intake/output data recorded. In: 240 [P.O.:240]  Out: -     CBC:   Recent Labs      02/15/18   0728  02/16/18   0540  02/17/18   0457   WBC  12.6*  12.2*  9.2   HGB  12.1  11.8*  10.4*   PLT  284  376  348     BMP:    Recent Labs      02/15/18   0728  02/16/18   0540  02/17/18   0457   NA  137  135  138   K  4.6  4.6  4.6   CL  102  101  105   CO2  21  21  24   BUN  32*  34*  32*   CREATININE  1.19*  1.19*  1.08*   GLUCOSE  115*  112*  94     Allergies: No Known Allergies     Assessment    1. 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 Adnexal mass likely primary ovarian carcinoma  3. Lower extremity DVT  4. Bilateral lower extremity lymphedema  5. Morbid obesity  6. C. diff colitis, improved with antibiotics      Plan   I had a long discussion with the patient, condition is improving rapidly. Status post paracentesis with good relief.   She is currently on

## 2018-02-17 NOTE — CARE COORDINATION
ONGOING DISCHARGE PLAN:    Spoke with patient regarding discharge plan and patient confirms that plan is still home [facility] ([facility] stu and discharge mediation list with communication that the patient discharged today. Will continue to follow for additional discharge needs.     Electronically signed by Anna Carpenter RN on 2/17/2018 at 5:36 PM

## 2018-02-17 NOTE — CARE COORDINATION
[address] Encounter Date/Time: 2018 [address] Account: [de-identified]    MRN: [id number]    Patient:  Dayo Wayne   Contact Serial #: [id number]             ENCOUNTER          Patient Class: I Private Enc? No Unit RM BD: [facility] PROG    Hospital Service: Intermediate   ADM DX: Dyspnea [R06.00]   ADM Provider: Sandee Baker MD   Procedure:     ATT Provider:     REF Provider:        PATIENT                 Name: Dayo Wayne : 1955 (62 yrs)   Address: [address] [place] [address] Sex: Female   City: [place] [address]         Marital Status: Single   Employer: [employer]         Amish: Synagogue   Primary Care Provider: Min Chu*         Primary Phone: [phone]   EMERGENCY CONTACT   Contact Name Legal Guardian? Relationship to Patient Home Phone Work Phone   1. Bette Huggins  2. Jose Peter   Brother/Sister  Brother/Sister [phone]           GUARANTOR            Guarantor: Dayo Wayne     : 1955   Address: [address] [place] [address] Sex: Female     [place] [address]     Relation to Patient: Self       Home Phone: [phone]   Guarantor ID: [id number]       Work Phone:     Guarantor Employer: [employer]         Status: RETIRED      COVERAGE        PRIMARY INSURANCE   Payor: [employer] [facility]   Payor Address: [place] [address]       Group Number: [id number] Insurance Type: INDEMNITY   Subscriber Name: Geovany Good : 1955   Subscriber ID: [id number] Pat. Rel. to Sub: Self   SECONDARY INSURANCE   Payor:   Plan:     Payor Address:  ,        Group Number:   Insurance Type:     Subscriber Name:   Subscriber :     Subscriber ID:   Pat.  Rel. to Sub:           Current Discharge Medication List     START taking these medications     Medication Dose   lactobacillus (CULTURELLE) capsule 1 capsule   Take 1 capsule by mouth 2 times daily   Quantity: 30 capsule Refills: 0       metroNIDAZOLE (FLAGYL) 500 MG tablet 500 mg   Take 1 tablet by mouth every 8 hours for 10 days   Quantity: 30 tablet Refills: 0       levofloxacin (LEVAQUIN) 500 MG tablet 500 mg   Take 1 tablet by mouth daily for 3 days   Quantity: 3 tablet Refills: 0       apixaban (ELIQUIS) 5 MG TABS tablet 5 mg   Take 1 tablet by mouth 2 times daily   Quantity: 60 tablet Refills: 0           CONTINUE these medications which have NOT CHANGED     Medication Dose   furosemide (LASIX) 20 MG tablet    TAKE 1 TABLET DAILY   Quantity: 90 tablet Refills: 1       potassium chloride (KLOR-CON M) 20 MEQ extended release tablet    TAKE 1 TABLET DAILY   Quantity: 90 tablet Refills: 1       potassium chloride (KLOR-CON M) 20 MEQ TBCR extended release tablet    take 1 tablet by mouth once daily   Quantity: 30 tablet Refills: 2       levothyroxine (LEVOTHROID) 25 MCG tablet 25 mcg   Take 1 tablet by mouth daily   Quantity: 30 tablet Refills: 3         Continuity of Care Form    Patient Name: Ronnie Billy   :  1955  MRN:  [id number]    Admit date:  2018  Discharge date:  2018    Code Status Order: Full Code   Advance Directives:  Yes    Admitting Physician:  Wayne Hill MD  PCP: Ailyn Harper CNP    Discharging Nurse: [facility]/Room#: 2105/2105-01  Discharging Unit Phone Number: [phone]    Emergency Contact:        Past Surgical History:  Past Surgical History:   Procedure Laterality Date    PARACENTESIS      TONSILLECTOMY         Immunization History:   Immunization History   Administered Date(s) Administered    Influenza, Quadv, 3 yrs and older, IM, Preservative Free 2018       Active Problems:  Patient Active Problem List   Diagnosis Code    Cellulitis of right lower extremity L03.115    Morbid obesity ([facility] [address] E66.01    Suspected deep tissue injury Z04.9    Alteration in skin integrity due to moisture R23.9    Fungal disease B49    Hypothyroidism E03.9 AM   Dressing/Treatment Other (Comment) 2/15/2018  7:30 AM   Wound Assessment Excoriated;Painful;Red 1/24/2018  4:32 AM   Drainage Amount Scant 1/24/2018  4:32 AM   Drainage Description Serous 1/24/2018  4:32 AM   Odor Strong 1/24/2018  4:32 AM   Number of days: 22        Elimination:  Continence:   · Bowel: Yes  · Bladder: Yes  Urinary Catheter: None   Colostomy/Ileostomy/Ileal Conduit: No           Intake/Output Summary (Last 24 hours) at 02/15/18 1058  Last data filed at 02/15/18 0745   Gross per 24 hour   Intake              240 ml   Output                0 ml   Net              240 ml     No intake/output data recorded. Safety Concerns: At Risk for Falls    Impairments/Disabilities:      Vision    Nutrition Therapy:  Current Nutrition Therapy:   - Oral Diet:  General    Routes of Feeding: Oral  Liquids: No Restrictions  Daily Fluid Restriction: no  Last Modified Barium Swallow with Video (Video Swallowing Test): not done    Treatments at the Time of Hospital Discharge:   Respiratory Treatments: see discharge med list  Oxygen Therapy:  is not on home oxygen therapy.   Ventilator:    - No ventilator support    Rehab Therapies: None  Weight Bearing Status/Restrictions: No weight bearing restirctions  Other Medical Equipment (for information only, NOT a DME order):  walker  Other Treatments: skilled nursing assessment, medication education and monitor    Patient's personal belongings (please select all that are sent with patient):  Glasses    RN SIGNATURE:  Electronically signed by Nani Ivan RN on 2/17/18 at 11:15 AM    CASE MANAGEMENT/SOCIAL WORK SECTION    Inpatient Status Date: 2/13/2018    [facility] Readmission Risk Assessment Score:  Risk Score: 13.75   (Score > 14= high risk for readmission)    Discharging to Facility/ [address]  Phone: [phone]  Fax [phone]        / signature: Electronically signed by Silver Ordoñez RN on 2/17/18 at

## 2018-02-17 NOTE — CARE COORDINATION
ONGOING DISCHARGE PLAN:    Spoke with patient regarding discharge plan and patient confirms that plan is still to discharge to home with vns  Per Pulm NP  ASSESSMENT / PLAN:  Dyspnea - prob due to pressure by ascites - improved  Elevated d dimer - non specific; CTA chest - no PE  Lymphedema  Ascites; + malignant cells 1/2018 + pelvic mass  DVT + left LE - anticoagulation per hematology  + C Diff - flagyl  s/p paracentesis 6.25 liters removed  s/p port placement       Will continue to follow for additional discharge needs.     Electronically signed by Hollie Perry RN on 2/17/2018 at 1:34 PM

## 2018-02-21 NOTE — TELEPHONE ENCOUNTER
Per hospital instructions pt needs 1 week f/u with us. Called her and stated the following:   Problems getting around at this time, has f/u with:  Gyn/Onc on 2.23.2018. Dr Niels Gowers on 2.28.2018. Just a FYI.

## 2018-02-22 PROBLEM — N17.9 AKI (ACUTE KIDNEY INJURY) (HCC): Status: ACTIVE | Noted: 2018-01-01

## 2018-02-22 PROBLEM — N94.89 ADNEXAL MASS: Status: ACTIVE | Noted: 2018-01-01

## 2018-02-22 PROBLEM — C56.9 OVARIAN CANCER (HCC): Status: ACTIVE | Noted: 2018-01-01

## 2018-02-22 PROBLEM — I82.409 ACUTE DVT (DEEP VENOUS THROMBOSIS) (HCC): Status: ACTIVE | Noted: 2018-01-01

## 2018-02-22 PROBLEM — A04.72 C. DIFFICILE DIARRHEA: Status: ACTIVE | Noted: 2018-01-01

## 2018-02-22 NOTE — ED NOTES
Pt states relief from pain and nausea at this time.   Pain is 5/10 compared to 7/10 nausea improved as well     Ramona Gallo, RN  02/22/18 7221

## 2018-02-22 NOTE — ED NOTES
Bed: 35  Expected date:   Expected time:   Means of arrival:   Comments:  Lexis Gonsalez, RN  02/22/18 2297

## 2018-02-22 NOTE — CONSULTS
appreciated   Pelvic Exam: Not completed. Patient declined attempt and states she may allow Dr. Jose Manzano to complete. Exam will be very limited secondary to body habitus. Rectal Exam: exam declined by patient  Extremities: Significant bilateral lower extremity lymphedema. Multiple open sores on bilateral lower extremities. Unable to appreciate any significant difference between legs. Psych:  oriented to time, place and person     LAB RESULTS:  Lab Results   Component Value Date    WBC 15.1 (H) 02/22/2018    HGB 12.1 02/22/2018    HCT 37.9 02/22/2018    MCV 89.8 02/22/2018     02/22/2018       Lab Results   Component Value Date     02/17/2018    K 4.6 02/17/2018     02/17/2018    CO2 24 02/17/2018    BUN 32 02/17/2018    CREATININE 1.08 02/17/2018    GLUCOSE 94 02/17/2018    CALCIUM 7.7 02/17/2018        DIAGNOSTICS:  Ct Abdomen Pelvis Wo Iv Contrast Additional Contrast? None    Result Date: 1/23/2018  EXAMINATION: CT OF THE ABDOMEN AND PELVIS WITHOUT CONTRAST 1/23/2018 2:51 pm TECHNIQUE: CT of the abdomen and pelvis was performed without the administration of intravenous contrast. Multiplanar reformatted images are provided for review. Dose modulation, iterative reconstruction, and/or weight based adjustment of the mA/kV was utilized to reduce the radiation dose to as low as reasonably achievable. COMPARISON: None HISTORY: ORDERING SYSTEM PROVIDED HISTORY: abd pain, distention TECHNOLOGIST PROVIDED HISTORY: Additional Contrast?->None Ordering Physician Provided Reason for Exam: Pt is confused and altered mental status since yesterday. Acuity: Acute Type of Exam: Initial FINDINGS: Lower Chest: No significant abnormality at the lung bases. Organs: Exam is limited by the absence of intravenous contrast. No urinary tract calculus. The kidneys are symmetric and normal in size. No perinephric or periureteral stranding. No collecting system dilatation. No focal renal lesion. No focal liver lesion. ultrasound was performed with color doppler flow evaluation. COMPARISON: CT abdomen and pelvis January 23, 2018 HISTORY: ORDERING SYSTEM PROVIDED HISTORY: 13 cm pelvic mass TECHNOLOGIST PROVIDED HISTORY: Acuity: Acute Type of Exam: Initial FINDINGS: Measurements: Uterus:  12.4 x 6.3 x 8.0 cm Endometrial stripe:  16 mm Right Ovary:  Not measured Left Ovary:  Not measured Ultrasound Findings: Study is significantly limited due to patient body habitus. Large amount of free fluid is seen throughout the pelvis. The endometrium appears thickened measuring up to 16 mm. Within the posterior cul-de-sac there is a large heterogeneous masslike area which measures up to 10.8 cm. Ovaries are not visualized. Significantly limited study. Abnormal thickening of the endometrium. Endometrial biopsy should be considered. Large masslike area is indeterminate and may represent a large fibroid, cervical lesion, or adnexal lesion. MRI may be helpful for further characterization. Ct Chest Pulmonary Embolism W Contrast    Result Date: 2/14/2018  EXAMINATION: CTA OF THE CHEST 2/14/2018 12:19 pm TECHNIQUE: CTA of the chest was performed after the administration of intravenous contrast.  Multiplanar reformatted images are provided for review. MIP images are provided for review. Dose modulation, iterative reconstruction, and/or weight based adjustment of the mA/kV was utilized to reduce the radiation dose to as low as reasonably achievable. COMPARISON: Abdominal CT January 23, 2018 HISTORY: ORDERING SYSTEM PROVIDED HISTORY: r/o PE TECHNOLOGIST PROVIDED HISTORY: Ordering Physician Provided Reason for Exam: SHORTNESS OF BREATH, R/O PE Acuity: Unknown Type of Exam: Unknown FINDINGS: Pulmonary Arteries: Pulmonary arteries are adequately opacified for evaluation. No evidence of intraluminal filling defect to suggest pulmonary embolism. Main pulmonary artery is normal in caliber. Mediastinum: No evidence of mediastinal lymphadenopathy. this admission. Would recommend reconsultation of hematology oncology for further treatment planning and paracentesis as needed for symptomatic relief.   Electronically signed by Skye Maya MD on 2/22/18 at 5:14 PM

## 2018-02-22 NOTE — PROGRESS NOTES
Short Call Note      This is a 57 y.o. female admitted 2/22/2018 for Ovarian cancer ([facility] [address] [C56.9]. See H&P of admitting resident for more details. Admitted with fatigue, weakness    Has a history of recent diagnosis of Pelvic Mass concerning for ovarian malignancy and ascitic fluid positive for non small cell carcinoma. She had paracentesis ×2, recent was one week ago    Has history of lymphedema bilateral lower limb since 2 years which is affecting her mobility    One week ago admitted to [facility] for acute left lower limb DVT. She was diagnosed with C. diff and was started on Flagyl. Since discharge she has not been able to walk up to the kitchen for food and fluids and hence she is dehydrated    On physical exam: She has dry mucous membrane. Lymphedema bilateral lower limb+++, ascites++    Biochemistry shows creatinine of 1.93(baseline creatinine of 1.17-1.19), potassium 5.7  Lactic acid 3.1, calcium 7.7  Albumin 2.4  LFT normal  White cell count elevated to 15.1    Assessment    Principal Problem:    JOCELINE (acute kidney injury) ([facility] [address]  Active Problems: Morbid obesity ([facility] [address]    Chronic acquired lymphedema    Acute DVT (deep venous thrombosis) (HCC)    Adnexal mass    Ovarian cancer (HCC)    C. difficile diarrhea  Resolved Problems:    * No resolved hospital problems. *        Plan     1  we will give 1 L normal saline bolus followed by maintenance 100 ml/h  2. Continue Eliquis for DVT  3. Will stop Lasix   4. Patient is on Flagyl for C. Diff. Will stop Flagyl and start oral vancomycin. 5.  Repeat potassium at 8 PM  6. Monitor BMP tomorrow  7. Oncology consultation  8. ObGyn consultation, seen by resident awaiting to see Dr. Parker Concepcion.   9.  On discharge patient would like to go for rehab      Thony Bliss MD            Department of Internal Medicine  [facility]         2/22/2018, 5:32 PM      ATTESTATION:    I have discussed the case, including pertinent

## 2018-02-22 NOTE — H&P
atypical cells compatible with nonsmall cell carcinoma suspicious for ovarian origin. Patient was readmitted on 2/14/18 to [facility] secondary to SOB. Patient was found to have an acute DVT in the common femoral, femoral and profunda veins of her left leg and an acute superficial venous thrombosis in the great saphenous vein. CT PE was negative for PE. Patient was seen by Dr. Elizabeth Jolly (Oncology) that admission and patient has plans to follow up with him for further management, appointment currently scheduled for 2/28/18. Patient also had repeat paracentesis and a port placed for chemotherapy on 2/16/18. She also was diagnosed with C. Diff that admission. She states she continues to have loose stools. She was suppose to follow up with Dr. Kimberlee Early her gynecological oncologist but has not been able to make an appointment as yet. OBGYN were consulted in ER and stated she is not a surgical candidate. She is being admitted for hem/onc and Gyn onc referral.   She was found to be C.diff positive and was on flagyl for it. She states she continues to have watery diarrhea. In ER her work up revealed elevated Cr of 1.93, K 5.7, LA 3.1, albumin 2.4, WBC 15.1. She is afebrile but is slightly hypotensive and tachycardic.      PAST MEDICAL HISTORY:        Diagnosis Date    Ascites     CAD (coronary artery disease)     Cellulitis     Headache     Lymphedema     Ovarian cancer ([facility] [address]     Thyroid disease        PAST SURGICAL HISTORY:        Procedure Laterality Date    PARACENTESIS      TONSILLECTOMY         MEDICATION PRIOR TO ADMISSION:  Prescriptions Prior to Admission: lactobacillus (CULTURELLE) capsule, Take 1 capsule by mouth 2 times daily  metroNIDAZOLE (FLAGYL) 500 MG tablet, Take 1 tablet by mouth every 8 hours for 10 days  apixaban (ELIQUIS) 5 MG TABS tablet, Take 1 tablet by mouth 2 times daily  furosemide (LASIX) 20 MG tablet, TAKE 1 TABLET DAILY  potassium chloride (KLOR-CON M) 20 MEQ extended release tablet, TAKE 1 TABLET DAILY  potassium chloride (KLOR-CON M) 20 MEQ TBCR extended release tablet, take 1 tablet by mouth once daily    Allergies:  Review of patient's allergies indicates no known allergies. SOCIAL HISTORY:   TOBACCO:   reports that she has never smoked. She has never used smokeless tobacco.  ETOH:   reports that she does not drink alcohol. DRUGS:   reports that she does not use drugs. FAMILY HISTORY:       Problem Relation Age of Onset    Alcohol Abuse Father        REVIEW OF SYSTEMS:  Constitutional: positive for fatigue. Negative for chills and fever. HENT: Negative for congestion and rhinorrhea. Eyes: Negative for visual disturbance. Respiratory: Positive for shortness of breath. Cardiovascular: Negative for chest pain. Gastrointestinal: Positive for abdominal pain, diarrhea. Negative for vomiting. Genitourinary: Positive for pelvic pain. Negative for dysuria, frequency, vaginal bleeding, vaginal discharge and vaginal pain. Musculoskeletal: Negative for arthralgias. Skin: Negative for wound. Neurological: Negative for dizziness and light-headedness. PHYSICAL EXAM:  BP (!) 99/58   Pulse (S) 107   Temp (S) 98 °F (36.7 °C) (Oral)   Resp (S) 18   Wt (S) (!) 357 lb (161.9 kg)   SpO2 100%   BMI 63.24 kg/m²   CONSTITUTIONAL:  awake, alert, cooperative, no apparent distress, and appears stated age. Port in place on upper left chest.   LUNGS:  No increased work of breathing, good air exchange, clear to auscultation bilaterally, no crackles or wheezing  CARDIOVASCULAR:  Normal apical impulse, regular rate and rhythm, normal S1 and S2, no S3 or S4, and no murmur noted  ABDOMEN:  No scars, normal bowel sounds, soft, distended, mildly tender, no masses palpated, no hepatosplenomegally  MUSCULOSKELETAL:  Severely enlarged LE edema. Full range of motion noted. Motor strength is 5 out of 5 all extremities bilaterally.   Tone is normal.  NEUROLOGIC:  Awake, alert, oriented to name,

## 2018-02-22 NOTE — ED PROVIDER NOTES
[facility]     Emergency Department     Faculty Attestation    I performed a history and physical examination of the patient and discussed management with the resident. I reviewed the residents note and agree with the documented findings and plan of care. Any areas of disagreement are noted on the chart. I was personally present for the key portions of any procedures. I have documented in the chart those procedures where I was not present during the key portions. I have reviewed the emergency nurses triage note. I agree with the chief complaint, past medical history, past surgical history, allergies, medications, social and family history as documented unless otherwise noted below. For Physician Assistant/ Nurse Practitioner cases/documentation I have personally evaluated this patient and have completed at least one if not all key elements of the E/M (history, physical exam, and MDM). Additional findings are as noted. History of ovarian cancer, patient states she has become so weak that she can no longer ambulate. Patient is tender in the lower abdomen without guarding or rebounding, no pain at McBurney's point. Patient is morbidly obese, bilateral pretibial pitting. History of DVT and the patient is on blood thinners. Patient does not appear anemic on exam but she does appear clinically dehydrated.   Chest clear, heart exam normal.  Antonette Coleman MD  Attending Emergency  Physician              Kaylee Cornejo MD  02/22/18 1136    EKG Interpretation    Interpreted by me    Rhythm: normal sinus   Rate: 133  Axis: normal -12  Ectopy: none  Conduction: normal  ST Segments: no acute change  T Waves: no acute change  Q Waves: lateral and inferior    Clinical Impression: Abnormal EKGl     Kaylee Cornejo MD  02/22/18 5037

## 2018-02-22 NOTE — ED PROVIDER NOTES
135 - 144 mmol/L    Potassium 5.7 (H) 3.7 - 5.3 mmol/L    Chloride 100 98 - 107 mmol/L    CO2 20 20 - 31 mmol/L    Anion Gap 14 9 - 17 mmol/L    GFR Non-African American 26 (L) >60 mL/min    GFR  32 (L) >60 mL/min    GFR Comment          GFR Staging NOT REPORTED    Lipase   Result Value Ref Range    Lipase 22 13 - 60 U/L   Hepatic Function Panel   Result Value Ref Range    Alb 2.4 (L) 3.5 - 5.2 g/dL    Alkaline Phosphatase 44 35 - 104 U/L    ALT 6 5 - 33 U/L    AST 25 <32 U/L    Total Bilirubin 0.28 (L) 0.3 - 1.2 mg/dL    Bilirubin, Direct 0.10 <0.31 mg/dL    Bilirubin, Indirect 0.18 0.00 - 1.00 mg/dL    Total Protein 5.9 (L) 6.4 - 8.3 g/dL    Globulin NOT REPORTED 1.5 - 3.8 g/dL    Albumin/Globulin Ratio 0.7 (L) 1.0 - 2.5       RADIOLOGY:  Xr Chest Standard (2 Vw)    Result Date: 2/22/2018  EXAMINATION: TWO VIEWS OF THE CHEST 2/22/2018 11:39 am COMPARISON: 02/13/2018 HISTORY: ORDERING SYSTEM PROVIDED HISTORY: SOB, productive sputum, evaluate for pneumonia TECHNOLOGIST PROVIDED HISTORY: Reason for exam:->SOB, productive sputum, evaluate for pneumonia FINDINGS: Frontal and lateral views of the chest are submitted for review. The cardiac silhouette is normal in size. The thoracic aorta is ectatic. Vascular port is present with its tip terminating overlying the expected location of the SVC. Lung parenchyma is clear without focal airspace consolidation, sizeable pleural effusion, or pneumothorax. Trachea is midline. Osseous structures and soft tissues are grossly intact. Degenerative changes of the bilateral acromioclavicular joints. No acute cardiopulmonary pathology.      EKG    Rhythm: normal sinus   Rate: tachycardia  Axis: normal  Ectopy: none  Conduction: normal  ST Segments: nonspecific changes  T Waves: non specific changes  Q Waves: none    Clinical Impression: non-specific EKG    Normal Interval Reference:  P-wave <110 ms  -200 ms  QRS <100 ms  QT <420 ms  QTc 330-470 ms    All EKG's are interpreted by the Emergency Department Physician who either signs or Co-signs this chart in the absence of a cardiologist.    EMERGENCY DEPARTMENT COURSE:    MDM: Patient was evaluated by gynecology oncology. This time advised admission and Dr. Alex Saleem will evaluate the patient on the floor. I discussed plan with the internal medicine resident who is agreeable for the admission. Hematology oncology will consult on the floor for further evaluation. Discussed with internal medicine resident that patient most likely experiencing worsening of Clostridium difficile and will be treated for severe Clostridium difficile infection inpatient as well. PROCEDURES:  None    CONSULTS:  IP CONSULT TO GYNECOLOGIC ONCOLOGY  IP CONSULT TO INTERNAL MEDICINE  IP CONSULT TO SOCIAL WORK  IP CONSULT TO ONCOLOGY    FINAL IMPRESSION      1. Clostridium difficile diarrhea    2.  Malignant neoplasm of ovary, unspecified laterality [facility])          DISPOSITION / PLAN     DISPOSITION  admitted      PATIENT REFERRED TO:  Nick Woods [facility] [place], operated by [facility]  [phone]            DISCHARGE MEDICATIONS:  New Prescriptions    No medications on file       Ulysses Guy, MD  Emergency Medicine Resident, PGY-2  [facility]    (Please note that portions of this note were completed with a voice recognition program.  Efforts were made to edit the dictations but occasionally words are mis-transcribed.)       Ulysses Guy, MD  Resident  02/22/18 6718

## 2018-02-23 PROBLEM — I82.4Y9 ACUTE DEEP VEIN THROMBOSIS (DVT) OF PROXIMAL VEIN OF LOWER EXTREMITY (HCC): Status: ACTIVE | Noted: 2018-01-01

## 2018-02-23 PROBLEM — E87.1 DEHYDRATION WITH HYPONATREMIA: Status: ACTIVE | Noted: 2018-01-01

## 2018-02-23 PROBLEM — E86.0 DEHYDRATION WITH HYPONATREMIA: Status: ACTIVE | Noted: 2018-01-01

## 2018-02-23 NOTE — PROGRESS NOTES
BILIDIR  0.10   BILITOT  0.28*   ALKPHOS  44        Assessment and Plan:   Principal Problem:    JOCELINE (acute kidney injury) ([facility] [address]  Active Problems:    C. difficile diarrhea    Dehydration with hyponatremia    Ovarian cancer ([facility] [address]    Morbid obesity (HCC)    Hypothyroidism    Chronic acquired lymphedema    Acute DVT (deep venous thrombosis) ([facility] [address]    Adnexal mass  Resolved Problems:    * No resolved hospital problems. *    1. Will repeat another 500 mls of saline bolus. Check orthostatic if possible  2. Continue maintenance fluid at 100 mls/hr  3. Continue vancomycin 125 mg every 6 for C. Diff  4. Increase Synthroid to 50 µg daily as TSH is 15.76. She will need repeat TSH in 8 weeks time as an outpatient  5. Check BMP tomorrow  6. ObGyn consultation noted- patient will be considered for debulking surgery after 3  cycles of chemotherapy if shows good response and medically fit. Follow up as outpatient   7. Continue Eliquis for DVT  8. Awaiting oncology recommendation  9. Patient would like to go to [facility] after discharge      Sandra Jimenez MD, PGY-2 Internal Medicine Resident  [facility]  2/23/2018  1:36 PM     ATTESTATION:    I have discussed the case, including pertinent history and exam findings with the residents. I have seen and examined the patient and the key elements of the encounter have been performed by me. I have reviewed the laboratory data, other diagnostic studies and discussed them with the residents. I have updated the medical record where necessary. I agree with the assessment, plan and orders as documented by the resident.     Kirsten Weber MD.

## 2018-02-23 NOTE — CONSULTS
origin. Patient was readmitted on 2/14/18 to [facility] secondary to SOB. Patient was found to have an acute DVT in the common femoral, femoral and profunda veins of her left leg and an acute superficial venous thrombosis in the great saphenous vein. CT PE was negative for PE. Patient was seen by Dr. Katy Cobb (Oncology) that admission and patient has plans to follow up with him for further management, appointment currently scheduled for 2/28/18. Patient also had repeat paracentesis and a port placed for chemotherapy on 2/16/18. She also was diagnosed with C. Diff that admission. She states she continues to have loose stools. Past Medical History:   has a past medical history of Ascites; CAD (coronary artery disease); Cellulitis; Headache; Lymphedema; Ovarian cancer ([address] and Thyroid disease. Past Surgical History:   has a past surgical history that includes Paracentesis and Tonsillectomy. Home Medications:    Prior to Admission medications    Medication Sig Start Date End Date Taking?  Authorizing Provider   lactobacillus (CULTURELLE) capsule Take 1 capsule by mouth 2 times daily 2/17/18   Gissell Tariq MD   metroNIDAZOLE (FLAGYL) 500 MG tablet Take 1 tablet by mouth every 8 hours for 10 days 2/17/18 2/27/18  Gissell Tariq MD   apixaban (ELIQUIS) 5 MG TABS tablet Take 1 tablet by mouth 2 times daily 2/17/18   Gissell Tariq MD   furosemide (LASIX) 20 MG tablet TAKE 1 TABLET DAILY 11/2/17   Amanda Good CNP   potassium chloride (KLOR-CON M) 20 MEQ extended release tablet TAKE 1 TABLET DAILY 11/2/17   Amanda Good CNP   potassium chloride (KLOR-CON M) 20 MEQ TBCR extended release tablet take 1 tablet by mouth once daily 6/7/17   Amanda Good CNP     Current Facility-Administered Medications   Medication Dose Route Frequency Provider Last Rate Last Dose    calcium carbonate (TUMS) chewable tablet 500 mg  500 mg Oral TID MD Lonny Harman ON

## 2018-02-23 NOTE — PROGRESS NOTES
AROM RLE (degrees)  RLE AROM: WFL  AROM LLE (degrees)  LLE AROM : WFL  AROM RUE (degrees)  RUE AROM : WFL  AROM LUE (degrees)  LUE AROM : WFL  Strength RLE  Comment: Grossly 4/5  Strength LLE  Comment: Grossly 4/5  Strength RUE  Comment: Grossly 4/5  Strength LUE  Comment: Grossly 4/5         Bed mobility  Supine to Sit: Moderate assistance  Sit to Supine: Moderate assistance (x2)  Transfers  Sit to Stand: Minimal Assistance  Stand to sit: Minimal Assistance  Ambulation  Ambulation?: Yes  Ambulation 1  Surface: level tile  Assistance: Contact guard assistance  Quality of Gait: Pt able to take a few lateral steps towards [facility]  Distance: 2ft  Comments: pt states to feeling lightheaded and dizzy when ambulating. Subisded once returned to supine. Balance  Posture: Fair  Sitting - Static: Good  Sitting - Dynamic: Good;-  Standing - Static: Fair;+  Standing - Dynamic: Fair  Comments: with RW        Assessment   Body structures, Functions, Activity limitations: Decreased functional mobility ; Decreased strength;Decreased endurance;Decreased balance  Assessment: Pt able to ambulate a few feet with RW and Haja, required ModAx2 for bed mobility. Pt would benefit from additional acute PT; unsafe to return home at this time. Prognosis: Good  Decision Making: Medium Complexity  Patient Education: PT POC  REQUIRES PT FOLLOW UP: Yes  Activity Tolerance  Activity Tolerance: Patient limited by fatigue;Patient limited by endurance         Plan   Plan  Times per week: 5-6x/wk  Current Treatment Recommendations: Strengthening, ROM, Balance Training, Functional Mobility Training, Transfer Training, Gait Training, Endurance Training  Safety Devices  Type of devices: Gait belt, Left in bed, Call light within reach, Nurse notified    G-Code  PT G-Codes  Functional Assessment Tool Used: Henderson  Score: CK  Functional Limitation: Mobility: Walking and moving around  Mobility: Walking and Moving Around Current Status ():  At least 40 percent but less than 60 percent impaired, limited or restricted  Mobility: Walking and Moving Around Goal Status ():  At least 1 percent but less than 20 percent impaired, limited or restricted  OutComes Score                                           AM-PAC Score     AM-PAC Inpatient Mobility without Stair Climbing Raw Score : 13  AM-PAC Inpatient without Stair Climbing T-Scale Score : 38.96  Mobility Inpatient CMS 0-100% Score: 58.44  Mobility Inpatient without Stair CMS G-Code Modifier : CK       Goals  Short term goals  Time Frame for Short term goals: 14 visits  Short term goal 1: Pt to ambulate 300ft with RW and CGA  Short term goal 2: Pt to tolerate at least 30mins of UE/LE exercises in order to increase overall strength  Short term goal 3: Pt to perform bed mobility and transfers with CGA  Short term goal 4: Pt to have increased dynamic standing balance to Good minus in order to decrease fall risk       Therapy Time   Individual Concurrent Group Co-treatment   Time In 1315         Time Out 1345         Minutes 1525 [facility],

## 2018-02-24 PROBLEM — E55.9 VITAMIN D DEFICIENCY: Status: ACTIVE | Noted: 2018-01-01

## 2018-02-24 NOTE — PROGRESS NOTES
[address] [place] [address]     Department of Internal Medicine - Staff Internal Medicine Service     DAILY PROGRESS NOTE  TEAM       Patient:  Bandar Banks  YOB: 1955  MRN: [id number]     Acct: [de-identified]     Admit date: 2/22/2018  Admitting Diagnosis: JOCELINE (acute kidney injury) ([facility]    Subjective:   Pt seen and Chart reviewed. Admitted with a TIA secondary to dehydration. Feeling better since yesterday  Her PO intake has also improved and feels she is gaining more strength  Could not work with PT yesterday as she was SOB and dizzy  Urine is dark per RN exact charting of UO not there      Creatinine improving 1.93->1.89->1.7 today  Sodium improved to 133, poatssium 5.5  iPTH 182.9, vit D 10.5    Review of Systems   Constitutional: Negative for chills and fever. Respiratory: Negative for cough and shortness of breath. Cardiovascular: Positive for leg swelling. Negative for chest pain and orthopnea. Gastrointestinal: Negative for abdominal pain, nausea and vomiting. Genitourinary: Negative. Neurological: Positive for dizziness. Negative for headaches. Objective:   BP (!) 154/100   Pulse 108   Temp 97.5 °F (36.4 °C) (Oral)   Resp 23   Ht 5' 3\" (1.6 m)   Wt (!) 353 lb 12.8 oz (160.5 kg)   SpO2 96%   BMI 62.67 kg/m²       PHYSICAL EXAMINATION :    Physical Exam   Constitutional: She is oriented to person, place, and time. No distress. HENT:   Dry mucous membranes   Eyes: Conjunctivae are normal. No scleral icterus. Neck: No JVD present. Cardiovascular: Normal rate, regular rhythm and normal heart sounds. Exam reveals no friction rub. No murmur heard. Pulmonary/Chest: Effort normal and breath sounds normal. No respiratory distress. She has no wheezes. She has no rales. Abdominal: Soft. She exhibits distension. There is no tenderness.    Musculoskeletal:   Bilateral lower limb chronic lymphedema   Neurological: She is alert and oriented to person, place, and Continue maintenance fluid at 100 mls/hr  3. Continue vancomycin 125 mg every 6 for C. Diff  4. Continue Synthroid 50 µg daily. She will need repeat TSH in 8 weeks time as an outpatient  5. Check potassium at 4 pm   6. ObGyn consultation noted- patient will be considered for debulking surgery after 3  cycles of chemotherapy if shows good response and medically fit. Follow up as outpatient   7. Continue Eliquis for DVT  8.  hemonc reccs appreciated - will follow OP for chemotherapy  9. Start vitamin D supp  10. Patient would like to go to [facility] after discharge      Rashel Rg MD, PGY-2 Internal Medicine Resident  [facility]  2/24/2018  10:48 AM       ATTESTATION:    I have discussed the case, including pertinent history and exam findings with the residents. I have seen and examined the patient and the key elements of the encounter have been performed by me. I have reviewed the laboratory data, other diagnostic studies and discussed them with the residents. I have updated the medical record where necessary. I agree with the assessment, plan and orders as documented by the resident.     Beth Shah MD.

## 2018-02-24 NOTE — PROGRESS NOTES
_       Progress Note               Date:                           2/24/2018  Patient name:           Dania Verduzco  Date of admission:  2/22/2018 10:30 AM  MRN:   [id number]  YOB: 1955  PCP:                           Kadie Ortiz CNP        Subjective:     Feels better and stronger  VSS  Diarrhea improved. Cr 1.7     HPI in Brief:   The patient is a 57 y.o.  female who is admitted to the hospital for generalized weakness. significant past medical history of pelvic mass concerning for ovarian cancer and asciitic fluid positive for non small cell carcinoma presented with abdominal pain, fatigue and ongoing diarrhea. She has chronic acquired lymphedema as well. Patient had been discharged from [facility] on 2/17/18 after diagnosis of Acute DVT. She is now on Eliquis for DVT. Patient states that since discharge from hospital she has had difficulty taking care of herself. She also complains of increasing fatigue and weakness. She states she is barely able to get off the couch to go to the restroom or to make food. She was found to have lactic acidosis and she was hypotensive and tachycardic and JOCELINE.     Patient had been admitted to [facility] on 1/24/18. At that time CT abdomen/pelvis was performed and demonstrated a pelvic mass measuring 13 cm likely ovarian in origin, bilateral pelvic lymphadenopathy, and large volume abdominal ascites concerning for malignancy. Chest xray was unremarkable.  TVUS was performed and demonstrated a large heterogenous mass-like area measuring up to 10.8 cm, an enlarged uterus measuring 12 cm, and thickened endometrium (16 mm) however the study was significantly limited secondary to patients body habitus. Patient underwent a IR guided paracentesis during which 6.2 L of fluid was removed. CA-125 was collected and found to be elevated at 151.  Ascitic fluid was positive for atypical cells compatible with nonsmall cell carcinoma suspicious for ovarian origin. Patient was readmitted on 2/14/18 to [facility] secondary to SOB. Patient was found to have an acute DVT in the common femoral, femoral and profunda veins of her left leg and an acute superficial venous thrombosis in the great saphenous vein. CT PE was negative for PE. Patient was seen by Dr. Elle Cassidy (Oncology) that admission and patient has plans to follow up with him for further management, appointment currently scheduled for 2/28/18. Patient also had repeat paracentesis and a port placed for chemotherapy on 2/16/18. She also was diagnosed with C. Diff that admission. She states she continues to have loose stools.      Problem Lists:   Primary Problem:  JOCELINE (acute kidney injury) ([facility]   Current Problems:  Active Hospital Problems    Diagnosis Date Noted    Vitamin D deficiency [E55.9] 02/24/2018    Dehydration with hyponatremia [E87.1] 02/23/2018    Acute deep vein thrombosis (DVT) of proximal vein of lower extremity ([facility] [I82.4Y9] 02/22/2018    Adnexal mass [N94.9] 02/22/2018    Malignant neoplasm of ovary ([address] [C56.9] 02/22/2018    JOCELINE (acute kidney injury) ([facility] [N17.9] 02/22/2018    C. difficile diarrhea [A04.72] 02/22/2018    Chronic acquired lymphedema [I89.0] 01/23/2018    Hypothyroidism [E03.9] 05/24/2017    Morbid obesity ([address] [E66.01] 04/12/2017     PMH:  Past Medical History:   Diagnosis Date    Ascites     CAD (coronary artery disease)     Cellulitis     Headache     Lymphedema     Ovarian cancer ([address]     Thyroid disease       Allergies: No Known Allergies     Medications:   Scheduled Meds:   sodium chloride  500 mL Intravenous Once    vitamin D  50,000 Units Oral Weekly    calcium carbonate  500 mg Oral TID    levothyroxine  50 mcg Oral Daily    apixaban  5 mg Oral BID    sodium chloride flush  10 mL Intravenous 2 times per day    sodium chloride flush  10 mL Intravenous 2 times per day    vancomycin  125 mg Oral Q6H     PRN Medications: sodium chloride flush, acetaminophen, sodium chloride flush  Continuous Infusions:   sodium chloride 100 mL/hr at 02/23/18 2059       Objective:   Vitals: BP (!) 154/100   Pulse 108   Temp 97.5 °F (36.4 °C) (Oral)   Resp 23   Ht 5' 3\" (1.6 m)   Wt (!) 353 lb 12.8 oz (160.5 kg)   SpO2 96%   BMI 62.67 kg/m²     General appearance - ill appearing, not in pain or distress, not jaundiced   Mental status - alert and cooperative   Eyes - pupils equal and reactive, extraocular eye movements intact   Mouth - mucous membranes moist, pharynx normal without lesions,   Neck - supple, no palpable  adenopathy , trach midline   Lymphatics - no palpable lymphadenopathy, no hepatosplenomegaly   Chest - clear to auscultation, no wheezes, rales or rhonchi, symmetric air entry , normal chest expansion  Heart - normal rate, regular rhythm, normal S1, S2, no murmurs,  Abdomen - soft, nontender, distended, no masses or organomegaly   Neurological - alert, oriented, normal speech, no focal findings or movement disorder noted   Musculoskeletal - no joint tenderness, deformity or swelling   Extremities - peripheral pulses normal, b/l LE lymphedema+, no clubbing or cyanosis   Skin - normal coloration and turgor, no rashes, no suspicious skin lesions noted ,   Port: site of port not red, no tenderness    Data:  No intake/output data recorded. In: 4504.2 [P.O.:1300;  I.V.:3204.2]  Out: 825 [Urine:825]  CBC:   Recent Labs      02/22/18   1113  02/23/18   0603  02/24/18   0618   WBC  15.1*  13.5*  14.4*   HGB  12.1  12.6  11.4*   PLT  289  See Reflexed IPF Result  322     BMP:    Recent Labs      02/22/18   1248  02/22/18   2046  02/23/18   0603  02/24/18   0618   NA  134*   --   129*  133*   K  5.7*  5.1  5.0  5.5*   CL  100   --   98  102   CO2  20   --   19*  17*   BUN  38*   --   41*  42*   CREATININE  1.93*   --   1.89*  1.70*   GLUCOSE  103*   --   99  106*     Hepatic:   Recent Labs      02/22/18   1248   AST  25   ALT  6   BILITOT  0.28*   ALKPHOS  44

## 2018-02-25 PROBLEM — N13.30 HYDRONEPHROSIS, BILATERAL: Status: ACTIVE | Noted: 2018-01-01

## 2018-02-25 PROBLEM — E87.1 DEHYDRATION WITH HYPONATREMIA: Status: RESOLVED | Noted: 2018-01-01 | Resolved: 2018-01-01

## 2018-02-25 PROBLEM — E87.5 HYPERKALEMIA: Status: ACTIVE | Noted: 2018-01-01

## 2018-02-25 PROBLEM — E86.0 DEHYDRATION WITH HYPONATREMIA: Status: RESOLVED | Noted: 2018-01-01 | Resolved: 2018-01-01

## 2018-02-25 NOTE — PLAN OF CARE
Problem: Risk for Impaired Skin Integrity  Goal: Tissue integrity - skin and mucous membranes  Structural intactness and normal physiological function of skin and  mucous membranes.    Outcome: Met This Shift      Problem: Falls - Risk of  Goal: Absence of falls  Outcome: Met This Shift      Problem: Pain:  Goal: Control of acute pain  Control of acute pain   Outcome: Met This Shift

## 2018-02-25 NOTE — CONSULTS
Renal Consult Note    Patient :  Thurmon Cranker; 57 y.o. MRN# [id number]  Location:  8456/2858-57  Attending:  Paula Cleary MD  Admit Date:  2/22/2018   Hospital Day: 3    Reason for Consult:     Asked by Dr Paula Cleary MD to see for JOCELINE/Elevated Creatinine. History Obtained From:     patient, electronic medical record    History of Present Illness:     Thurmon Cranker; 57 y.o. female with past medical history pelvic mass positive for ovarian cancer and asciitic fluid positive for non small cell carcinoma presented with abdominal pain, fatigue and ongoing diarrhea on 2/22/18. Apparently per OB/GYN, said she is not a surgical candidate. They are recommending chemotherapy. Prior to this, she was at [facility] for similar complaint, diagnosed with DVT, started on anticoagulation, and had a paracentesis. She was also diagnosed with C Diff and started on flagyl. Since her discharge from [facility], she had poor PO intake, diarrhea, and weakness. She recalls decreased urine, darker color, but no hematuria or pain with urination. Nephrology is consulted due to elevated creatinine that has been persistent since her admission several days ago. On  admission her creatinine was 1.9, so her diuretics held and IVF initiated. Prior creatinine 2/17/19 was 1.08. Today creatinine is 1.5. She received Kayexalate yesterday for potassium of 5.7. Today 5.2. An additional kayexalate ordered but patient refused. Her diet was just changed today to a low potassium diet. Additionally her Bicarb levels are low . 14 today. Renal / creatinine results below  Results for Jakob Crain (MRN [id number]) as of 2/25/2018 10:02   Ref.  Range 2/16/2018 05:40 2/17/2018 04:57 2/22/2018 12:48 2/23/2018 06:03 2/24/2018 06:18 2/25/2018 06:02   Creatinine Latest Ref Range: 0.50 - 0.90 mg/dL 1.19 (H) 1.08 (H) 1.93 (H) 1.89 (H) 1.70 (H) 1.54 (H)     Renal US completed yesterday shows mild hydronephrosis, which was not present on CT 1 month ago. Urology consulted and planning to proceed with bilateral ureteral stenting. UOP has been minimal 650 over last 24 hours. She is on NS @ 75/hr. She is on Vancomycin 125mg PO Q 6 hours  She had a CT of chest with contrast on 2/14/18  Microscopic UA normal except increased epithelial cells  No h/o prolonged NSAIDs use in the past.     Past History/Allergies? Social History:     Past Medical History:   Diagnosis Date    Ascites     CAD (coronary artery disease)     Cellulitis     Headache     Lymphedema     Ovarian cancer ([address]     Thyroid disease        No Known Allergies    Social History     Social History    Marital status: Single     Spouse name: N/A    Number of children: N/A    Years of education: N/A     Occupational History    Not on file.      Social History Main Topics    Smoking status: Never Smoker    Smokeless tobacco: Never Used    Alcohol use No    Drug use: No    Sexual activity: No     Other Topics Concern    Not on file     Social History Narrative    No narrative on file       Family History:        Family History   Problem Relation Age of Onset    Alcohol Abuse Father        Outpatient Medications:     Prescriptions Prior to Admission: lactobacillus (CULTURELLE) capsule, Take 1 capsule by mouth 2 times daily  metroNIDAZOLE (FLAGYL) 500 MG tablet, Take 1 tablet by mouth every 8 hours for 10 days  apixaban (ELIQUIS) 5 MG TABS tablet, Take 1 tablet by mouth 2 times daily  furosemide (LASIX) 20 MG tablet, TAKE 1 TABLET DAILY  potassium chloride (KLOR-CON M) 20 MEQ extended release tablet, TAKE 1 TABLET DAILY  potassium chloride (KLOR-CON M) 20 MEQ TBCR extended release tablet, take 1 tablet by mouth once daily    Current Medications:     Scheduled Meds:    calcium carbonate  600 mg Oral BID    [START ON 2/26/2018] levothyroxine  25 mcg Oral Daily    sodium polystyrene  15 g Oral Once    vitamin D  50,000 Units Oral Weekly    apixaban  5 mg Oral BID    sodium chloride flush  10 mL Intravenous 2 times per day    sodium chloride flush  10 mL Intravenous 2 times per day    vancomycin  125 mg Oral Q6H     Continuous Infusions:    sodium chloride 75 mL/hr at 18 0728     PRN Meds:  sodium chloride flush, acetaminophen, sodium chloride flush    Review of Systems:     Constitutional: No fever, no chills, no lethargy, no weakness. HEENT:  No headache, otalgia, itchy eyes, nasal discharge or sore throat. Cardiac:  No chest pain, dyspnea, orthopnea or PND. Chest:              ++cough, ++phlegm no  Wheezing, but exertional dyspnea  Abdomen:  ++ abdominal pain, + nausea no vomiting. Neuro:  No focal weakness, abnormal movements or seizure like activity. Skin:   No rashes, no itching. :   No hematuria, no pyuria, no dysuria, no flank pain. Extremities:  No swelling or joint pains. ROS was otherwise negative except as mentioned in the [address] Input/Output:       I/O last 3 completed shifts: In: 2100 [I.V.:2100]  Out: 650 [Urine:650]    Vital Signs:   Temperature:  Temp: 97.7 °F (36.5 °C)  TMax:   Temp (24hrs), Av.6 °F (36.4 °C), Min:97 °F (36.1 °C), Max:98 °F (36.7 °C)    Respirations:  Resp: 16  Pulse:   Pulse: 99  BP:    BP: (!) 168/66  BP Range: Systolic (96PAB), WML:951 , Min:108 , KND:007       Diastolic (36GOO), BGB:80, Min:66, Max:91      Physical Examination:     General:  AAO x 3, speaking in full sentences, no accessory muscle use. HEENT: Atraumatic, normocephalic, no throat congestion, moist mucosa. Eyes:   Pupils equal, round and reactive to light, EOMI. Neck:   No JVD, no thyromegaly, no lymphadenopathy. Chest:   Bilateral vesicular breath sounds, no rales or wheezes. Cardiac:  S1 S2 RR, no murmurs, gallops or rubs, JVP not raised. Abdomen: Soft, tender, non distended, BS audible. Morbidly obese  :   No suprapubic or flank tenderness. Neuro:  AAO x 3, No FND. SKIN:  No rashes, good skin turgor.   Extremities:  ++lymphedema No clubbing, No

## 2018-02-25 NOTE — PROGRESS NOTES
Short note    [facility] renal  FINDINGS:  Kidneys: The right kidney measures 9.7 cm in length and the left kidney measures 10.4  cm in length. Kidneys demonstrate normal cortical echogenicity.  There appears to been  interval development of mild hydronephrosis bilateral without intrarenal  stones. Ascites is noted.  Echogenic foci in the gallbladder reflect cholelithiasis. Bladder:    Unremarkable appearance of the bladder.  No significant post void residual.  Complicated pelvic mass lesion seen as described on CT. Impression:     Interval development of mild bilateral hydronephrosis. Complicated pelvic mass lesion seen as described on CT. Spoke to IR Dr Chip Maciel for nephrostomy    He suggested consulting urology if retrograde nephrostomy is an option. Plan  1. Hold eliquis until decision for nephrostomy  2. Low potassium diet  3. Urology consult  4. Nephrology consult  5.  INR stat  6. UA with microscopy    Electronically signed by Zoya Fournier MD on 2/24/2018 at 7:33 PM

## 2018-02-25 NOTE — PROGRESS NOTES
_       Progress Note               Date:                           2/25/2018  Patient name:           Noelle Prather  Date of admission:  2/22/2018 10:30 AM  MRN:   [id number]  YOB: 1955  PCP:                           Gabino Correa CNP        Subjective:     Feels better and stronger  VSS  Cr 1.54  Renal usg showed b/l mild hydronephrosis and urology has been consulted and plan for b/l ureteral stents for concerns for external compression from mass. HPI in Brief:   The patient is a 57 y.o.  female who is admitted to the hospital for generalized weakness. significant past medical history of pelvic mass concerning for ovarian cancer and asciitic fluid positive for non small cell carcinoma presented with abdominal pain, fatigue and ongoing diarrhea. She has chronic acquired lymphedema as well. Patient had been discharged from [facility] on 2/17/18 after diagnosis of Acute DVT. She is now on Eliquis for DVT. Patient states that since discharge from hospital she has had difficulty taking care of herself. She also complains of increasing fatigue and weakness. She states she is barely able to get off the couch to go to the restroom or to make food. She was found to have lactic acidosis and she was hypotensive and tachycardic and JOCELINE.     Patient had been admitted to [facility] on 1/24/18. At that time CT abdomen/pelvis was performed and demonstrated a pelvic mass measuring 13 cm likely ovarian in origin, bilateral pelvic lymphadenopathy, and large volume abdominal ascites concerning for malignancy. Chest xray was unremarkable.  TVUS was performed and demonstrated a large heterogenous mass-like area measuring up to 10.8 cm, an enlarged uterus measuring 12 cm, and thickened endometrium (16 mm) however the study was significantly limited secondary to patients body habitus. Patient underwent a IR guided paracentesis during which 6.2 L of fluid was removed.  CA-125 was collected and found 42*   CREATININE  1.89*  1.70*   --   1.54*   GLUCOSE  99  106*   --   104*     Hepatic:   Recent Labs      02/22/18   1248   AST  25   ALT  6   BILITOT  0.28*   ALKPHOS  44     INR:   Recent Labs      02/25/18   0037   INR  0.9       IMAGING DATA:      Assessment      1. Ovarian cancer and carcinomatosis. Malignant ascites. 2. Lower extremity proximal DVT on Eliquis. 3. Morbid obesity. 4. Bilateral lower extremity lymphedema. 5. Severe clostridium difficile colitis. 6.  JOCELINE  7. B/l hydronephrosis from external compression of ureter from adnexal mass. Plan     Renal usg showed b/l mild hydronephrosis and urology has been consulted and plan for b/l ureteral stents for concerns for external compression from mass. Plan to see the patient outpatient for chemotherapy. Will follow as needed.      Discussed with patient and Nurse.  Alvin Patiño MD,   PGY-3 Internal Medicine Resident. [facility]. 2/25/2018  11:35 AM      Attending Physician Statement   I have discussed the care of Ashley Wallace, including pertinent history and exam findings with the resident. I have reviewed the key elements of all parts of the encounter with the resident. I have seen and examined the patient with the resident. I agree with the assessment and plan and status of the problem list as documented.                                [address] [place] [address] [facility]                          Cell: [phone]

## 2018-02-25 NOTE — PROGRESS NOTES
[address] [place] [address]     Department of Internal Medicine - Staff Internal Medicine Service     DAILY PROGRESS NOTE  TEAM       Patient:  Noelle Prather  YOB: 1955  MRN: [id number]     Acct: [de-identified]     Admit date: 2/22/2018  Admitting Diagnosis: JOCELINE (acute kidney injury) ([facility]    Subjective:   Pt seen and Chart reviewed. Admitted with a JOCELINE secondary to dehydration. Pt feeling slightly better, but had 1 episode of vomiting last night  Her creatinine slightly better than yesterday, 1.54 reduced from 1.92 on admission  Her potassium is 5.2 this morning  Urology will proceed with bilateral ureteral stenting on Monday. Review of Systems   Constitutional: Negative for chills and fever. Respiratory: Negative for cough and shortness of breath. Cardiovascular: Positive for leg swelling. Negative for chest pain and orthopnea. Gastrointestinal: Positive for vomiting. Negative for abdominal pain and nausea. Genitourinary: Negative. Neurological: Negative for dizziness and headaches. Objective:   BP (!) 142/75   Pulse 104   Temp 98 °F (36.7 °C) (Oral)   Resp 15   Ht 5' 3\" (1.6 m)   Wt (!) 353 lb 12.8 oz (160.5 kg)   SpO2 98%   BMI 62.67 kg/m²       PHYSICAL EXAMINATION :    Physical Exam   Constitutional: She is oriented to person, place, and time. No distress. HENT:   Dry mucous membranes   Eyes: Conjunctivae are normal. No scleral icterus. Neck: No JVD present. Cardiovascular: Normal rate, regular rhythm and normal heart sounds. Exam reveals no friction rub. No murmur heard. Pulmonary/Chest: Effort normal and breath sounds normal. No respiratory distress. She has no wheezes. She has no rales. Abdominal: Soft. She exhibits distension. There is no tenderness. Musculoskeletal:   Bilateral lower limb chronic lymphedema   Neurological: She is alert and oriented to person, place, and time. No cranial nerve deficit. She exhibits normal muscle tone. Intake/Output Summary (Last 24 hours) at 02/25/18 5507  Last data filed at 02/24/18 1933   Gross per 24 hour   Intake                0 ml   Output              650 ml   Net             -650 ml         Medications:      vitamin D  50,000 Units Oral Weekly    calcium carbonate  500 mg Oral TID    levothyroxine  50 mcg Oral Daily    apixaban  5 mg Oral BID    sodium chloride flush  10 mL Intravenous 2 times per day    sodium chloride flush  10 mL Intravenous 2 times per day    vancomycin  125 mg Oral Q6H       Diagnostic Labs and Imaging    CBC:   Recent Labs      02/22/18   1113  02/23/18   0603  02/24/18   0618   WBC  15.1*  13.5*  14.4*   RBC  4.22  4.32  3.98   HGB  12.1  12.6  11.4*   HCT  37.9  39.5  37.6   MCV  89.8  91.4  94.5   RDW  14.6*  14.7*  14.9*   PLT  289  See Reflexed IPF Result  322     BMP:   Recent Labs      02/22/18   1248   02/23/18   0603  02/24/18   0618  02/24/18   1627   NA  134*   --   129*  133*   --    K  5.7*   < >  5.0  5.5*  5.7*   CL  100   --   98  102   --    CO2  20   --   19*  17*   --    BUN  38*   --   41*  42*   --    CREATININE  1.93*   --   1.89*  1.70*   --     < > = values in this interval not displayed. FASTING LIPID PANEL:  Lab Results   Component Value Date    CHOL 167 05/23/2017    HDL 45 05/23/2017    TRIG 127 05/23/2017     LIVER PROFILE:   Recent Labs      02/22/18   1248   AST  25   ALT  6   BILIDIR  0.10   BILITOT  0.28*   ALKPHOS  44        Assessment and Plan:   Principal Problem:    JOCELINE (acute kidney injury) ([facility]  Active Problems:    Hydronephrosis, bilateral    Hyperkalemia    Malignant neoplasm of ovary (HCC)    C. difficile diarrhea    Morbid obesity (HCC)    Hypothyroidism    Chronic acquired lymphedema    Acute deep vein thrombosis (DVT) of proximal vein of lower extremity (HCC)    Adnexal mass    Vitamin D deficiency  Resolved Problems:    Dehydration with hyponatremia    Admitted with JOCELINE secondary to dehydration.  Renal function

## 2018-02-25 NOTE — PLAN OF CARE
Problem: Risk for Impaired Skin Integrity  Goal: Tissue integrity - skin and mucous membranes  Structural intactness and normal physiological function of skin and  mucous membranes. Outcome: Ongoing  Skin assessment complete. No new breakdown noted. Interventions in place. See doc flowsheet for interventions initiated. Pt encouraged and assisted to turn. Will continue to monitor for additional needs and skin breakdown. Problem: Pain:  Goal: Control of acute pain  Control of acute pain   Outcome: Ongoing  Pain level assessment complete. Pt rated pain at 3/10. Pt educated on pain scale and control interventions. PRN pain medication given per pt request. Pt VU to call out c new onset of pain or unrelieved pain. Will continue to monitor.

## 2018-02-26 NOTE — TELEPHONE ENCOUNTER
Upon reviewing EPIC noted pt continues admitted to [address] bilateral ureteral stents placed today r/t bilateral hydronephrosis. Will continue to follow.

## 2018-02-26 NOTE — PLAN OF CARE
Problem: Falls - Risk of  Goal: Absence of falls  Outcome: Ongoing  Pt remains free of falls at this time. Bed locked in lowest position, siderails x2, call light in reach. Non-skid footwear applied. Encouraged pt to call for assistance as needed for safety. Falling star posted outside of room. Will continue to monitor. Problem: Pain:  Goal: Pain level will decrease  Pain level will decrease   Outcome: Ongoing  Pain level assessment complete. Pt educated on pain scale and control interventions. PRN pain medication given-see MAR. Will continue to monitor.

## 2018-02-26 NOTE — PROGRESS NOTES
was readmitted on 2/14/18 to [facility] secondary to SOB. Patient was found to have an acute DVT in the common femoral, femoral and profunda veins of her left leg and an acute superficial venous thrombosis in the great saphenous vein. CT PE was negative for PE. Patient was seen by Dr. Gino Lewis (Oncology) that admission and patient has plans to follow up with him for further management, appointment currently scheduled for 2/28/18. Patient also had repeat paracentesis and a port placed for chemotherapy on 2/16/18. She also was diagnosed with C. Diff that admission. She states she continues to have loose stools.      Problem Lists:   Primary Problem:  JOCELINE (acute kidney injury) ([facility] [address]   Current Problems:  Active Hospital Problems    Diagnosis Date Noted    Hydronephrosis, bilateral [N13.30] 02/25/2018    Hyperkalemia [E87.5] 02/25/2018    Vitamin D deficiency [E55.9] 02/24/2018    Acute deep vein thrombosis (DVT) of proximal vein of lower extremity ([facility] [address] [I82.4Y9] 02/22/2018    Adnexal mass [N94.9] 02/22/2018    Malignant neoplasm of ovary ([facility] [address] [C56.9] 02/22/2018    JOCELINE (acute kidney injury) ([facility] [address] [N17.9] 02/22/2018    C. difficile diarrhea [A04.72] 02/22/2018    Chronic acquired lymphedema [I89.0] 01/23/2018    Hypothyroidism [E03.9] 05/24/2017    Morbid obesity ([address] [E66.01] 04/12/2017     PMH:  Past Medical History:   Diagnosis Date    Ascites     CAD (coronary artery disease)     Cellulitis     Headache     Lymphedema     Ovarian cancer ([facility] [address]     Thyroid disease       Allergies: No Known Allergies     Medications:   Scheduled Meds:   [MAR Hold] metoprolol tartrate  12.5 mg Oral BID    [MAR Hold] levothyroxine  25 mcg Oral Daily    [MAR Hold] vitamin D  50,000 Units Oral Weekly    [MAR Hold] apixaban  5 mg Oral BID    [MAR Hold] sodium chloride flush  10 mL Intravenous 2 times per day    [facility] Hold] sodium chloride flush  10 mL Intravenous 2 times per day    [MAR Hold] vancomycin  125 mg Oral

## 2018-02-26 NOTE — PROGRESS NOTES
ureteral stent placement today    1. Continue bicarb GTT as per nephrology  2. Strict input output chart  3. Urology planning bilateral ureteral stents today  4. Eliquis to continue  6. Low potassium diet  8. Continue calcium supplements  9. Vitamin D replacement  10. Oncology has plan to see patient in outpatient for chemotherapy  11. ObGyn will follow outpatient after chemotherapy  12. Synthroid 25 mcg. Pt was on 25 mcg for 1 month in summer then it was stopped. Will continue same dose . TSH as OP in 8 weeks  13. Continue vancomycin 125 mg every 6 for C. diff for total 14 days      Dr Too Pitts MD, PGY-2 Internal Medicine Resident  [facility]  2/26/2018  12:42 PM     ATTESTATION:    I have discussed the case, including pertinent history and exam findings with the residents. I have seen and examined the patient and the key elements of the encounter have been performed by me. I have reviewed the laboratory data, other diagnostic studies and discussed them with the residents. I have updated the medical record where necessary. I agree with the assessment, plan and orders as documented by the resident.     Ainsley Serna MD.

## 2018-02-26 NOTE — ANESTHESIA POSTPROCEDURE EVALUATION
Department of Anesthesiology  Postprocedure Note    Patient: Maury Segura  MRN: [id number]  YOB: 1955  Date of evaluation: 2/26/2018  Time:  6:52 PM     Procedure Summary     Date:  02/26/18 Room / Location:  [facility] OR  / [facility] OR    Anesthesia Start:  4986 Anesthesia Stop:  2949    Procedure:  CYSTOSCOPY, RIGHT RETROGRADE PYELOGRAM,  BILATERAL URETERAL STENT INSERTION (N/A ) Diagnosis:  (LARGE PELVIC MASS, HYDRONEPHROSIS)    Surgeon:  Sylvia Guillory MD Responsible Provider:  Kleber Jansen MD    Anesthesia Type:  MAC, general ASA Status:  4          Anesthesia Type: MAC, general    Warren Phase I: Warren Score: 9    Warren Phase II:      Last vitals: Reviewed and per EMR flowsheets.      POST-OP ANESTHESIA NOTE       /64   Pulse 85   Temp 97.6 °F (36.4 °C) (Oral)   Resp 12   Ht 5' 3\" (1.6 m)   Wt (!) 353 lb (160.1 kg)   SpO2 98%   BMI 62.53 kg/m²    Pain Assessment: 0-10  Pain Level: 6           Anesthesia Post Evaluation    Patient location during evaluation: PACU  Patient participation: complete - patient participated  Level of consciousness: awake  Pain score: 6  Airway patency: patent  Nausea & Vomiting: no nausea and no vomiting  Complications: no  Cardiovascular status: hemodynamically stable  Respiratory status: acceptable  Hydration status: stable

## 2018-02-26 NOTE — ANESTHESIA PRE PROCEDURE
Department of Anesthesiology  Preprocedure Note       Name:  Woody Anglin   Age:  57 y.o.  :  1955                                          MRN:  [id number]         Date:  2018      Surgeon: Da Villa):  Baldomero Zabala MD    Procedure: Procedure(s):  CYSTOSCOPY URETERAL STENT INSERTION    Medications prior to admission:   Prior to Admission medications    Medication Sig Start Date End Date Taking?  Authorizing Provider   lactobacillus (CULTURELLE) capsule Take 1 capsule by mouth 2 times daily 18   Jeermiah Ramírez MD   metroNIDAZOLE (FLAGYL) 500 MG tablet Take 1 tablet by mouth every 8 hours for 10 days 18  Jeremiah Ramírez MD   apixaban (ELIQUIS) 5 MG TABS tablet Take 1 tablet by mouth 2 times daily 18   Jeremiah Ramírez MD   furosemide (LASIX) 20 MG tablet TAKE 1 TABLET DAILY 17   Tricia Salazar CNP   potassium chloride (KLOR-CON M) 20 MEQ extended release tablet TAKE 1 TABLET DAILY 17   Tricia Salazar CNP   potassium chloride (KLOR-CON M) 20 MEQ TBCR extended release tablet take 1 tablet by mouth once daily 17   Tricia Salazar CNP       Current medications:    Current Facility-Administered Medications   Medication Dose Route Frequency Provider Last Rate Last Dose    [MAR Hold] metoprolol tartrate (LOPRESSOR) tablet 12.5 mg  12.5 mg Oral BID Emmanuel Bowers MD   12.5 mg at 18 1021    lactated ringers infusion   Intravenous Continuous Abby Gonzalez  mL/hr at 18 1146      [MAR Hold] levothyroxine (SYNTHROID) tablet 25 mcg  25 mcg Oral Daily Emmanuel Bowers MD        [facility] Hold] calcium carbonate (TUMS) chewable tablet 500 mg  500 mg Oral BID PRN Emmanuel Bowers MD   500 mg at 18 2311    [MAR Hold] sodium bicarbonate 150 mEq in dextrose 5 % 1,000 mL infusion  150 mEq Intravenous Continuous DORA Pryor 75 mL/hr at 18 1335 150 mEq at 18 1335    [MAR Hold] vitamin D (ERGOCALCIFEROL) capsule

## 2018-02-27 NOTE — PROGRESS NOTES
[address] [place] [address]     Department of Internal Medicine - Staff Internal Medicine Service     DAILY PROGRESS NOTE  TEAM       Patient:  Neema Mendez  YOB: 1955  MRN: [id number]     Acct: [de-identified]     Admit date: 2/22/2018  Admitting Diagnosis: JOCELINE (acute kidney injury) ([facility] [address]    Subjective:   Pt seen and Chart reviewed. Admitted with a JOCELINE secondary to dehydration and b/l hydronephrosis s/p b/l ureteral stent placements 2/26/18    Feeling SOB and fatigued  Having good urine output  Creatinine increased to 1.95  Doesn't want to work with PT  Tired of all the picking and prodding    Review of Systems   Constitutional: Negative for chills and fever. Respiratory: Negative for cough and shortness of breath. Cardiovascular: Positive for leg swelling. Negative for chest pain and orthopnea. Gastrointestinal: Negative for abdominal pain, nausea and vomiting. Genitourinary: Negative. Neurological: Negative for dizziness and headaches. Objective:   /84   Pulse 97   Temp 97.4 °F (36.3 °C)   Resp 17   Ht 5' 3\" (1.6 m)   Wt (!) 353 lb (160.1 kg)   SpO2 98%   BMI 62.53 kg/m²       PHYSICAL EXAMINATION :    Physical Exam   Constitutional: She is oriented to person, place, and time. No distress. HENT:   Dry mucous membranes   Eyes: Conjunctivae are normal. No scleral icterus. Neck: No JVD present. Cardiovascular: Normal rate, regular rhythm and normal heart sounds. Exam reveals no friction rub. No murmur heard. Pulmonary/Chest: Effort normal and breath sounds normal. No respiratory distress. She has no wheezes. She has no rales. Abdominal: Soft. She exhibits distension. There is no tenderness. Musculoskeletal: She exhibits edema (bilateral LL 3+). Bilateral lower limb chronic lymphedema   Neurological: She is alert and oriented to person, place, and time. No cranial nerve deficit. She exhibits normal muscle tone.          Intake/Output Summary (Last 24 hours) at 02/27/18 1348  Last data filed at 02/27/18 1103   Gross per 24 hour   Intake             2997 ml   Output              500 ml   Net             2497 ml         Medications:      metoprolol tartrate  12.5 mg Oral BID    levothyroxine  25 mcg Oral Daily    vitamin D  50,000 Units Oral Weekly    apixaban  5 mg Oral BID    sodium chloride flush  10 mL Intravenous 2 times per day    sodium chloride flush  10 mL Intravenous 2 times per day    vancomycin  125 mg Oral Q6H       Diagnostic Labs and Imaging    CBC:   Recent Labs      02/25/18   0602  02/27/18   0600   WBC  15.1*  16.1*   RBC  4.02  3.96   HGB  11.9  11.4*   HCT  40.1  36.9   MCV  99.8  93.2   RDW  14.9*  15.0*   PLT  332  339     BMP:   Recent Labs      02/25/18   1429  02/26/18   0600  02/27/18   0600   NA  132*  134*  130*   K  4.7  4.6  4.7   CL  103  103  98   CO2  18*  18*  18*   BUN  43*  44*  49*   CREATININE  1.78*  1.66*  1.95*     FASTING LIPID PANEL:  Lab Results   Component Value Date    CHOL 167 05/23/2017    HDL 45 05/23/2017    TRIG 127 05/23/2017     LIVER PROFILE:   No results for input(s): AST, ALT, ALB, BILIDIR, BILITOT, ALKPHOS in the last 72 hours. Assessment and Plan:   Principal Problem:    JOCELINE (acute kidney injury) ([facility] [address]  Active Problems: Morbid obesity ([facility] [address]    Hypothyroidism    Chronic acquired lymphedema    Acute deep vein thrombosis (DVT) of proximal vein of lower extremity (HCC)    Adnexal mass    Malignant neoplasm of ovary (HCC)    C. difficile diarrhea    Vitamin D deficiency    Hydronephrosis, bilateral    Hyperkalemia  Resolved Problems:    Dehydration with hyponatremia    Admitted with JOCELINE secondary to dehydration. Renal function improving since admission. Recent diagnosis of pelvic mass measuring 13 cm likely ovarian in origin with bilateral pelvic lymphadenopathy. USS renal showed bilateral mild hydronephrosis. Patient had bilateral ureteral stent placement 2/27/18    1.   Continue bicarb GTT as per nephrology  2. Strict input output chart  3. Urology reccs noted - FU in office for stent replacement in 3 mths  4. Eliquis to continue  6. Low potassium diet  8. Continue calcium supplements  9. Vitamin D replacement  10. Oncology has plan to see patient in outpatient for chemotherapy  11. ObGyn will follow outpatient after chemotherapy  12. Synthroid 25 mcg. Pt was on 25 mcg for 1 month in summer then it was stopped. Will continue same dose . TSH as OP in 8 weeks  13. Continue vancomycin 125 mg every 6 for C. diff for total 14 days  14. IR consulted for therapeutic paracentesis      Dr Josselin Barakat MD, PGY-2 Internal Medicine Resident  [facility]  2/27/2018  1:48 PM     ATTESTATION:    I have discussed the case, including pertinent history and exam findings with the residents. I have seen and examined the patient and the key elements of the encounter have been performed by me. I have reviewed the laboratory data, other diagnostic studies and discussed them with the residents. I have updated the medical record where necessary. I agree with the assessment, plan and orders as documented by the resident.     Joe Chun MD.

## 2018-02-27 NOTE — PROGRESS NOTES
room.  Pain Screening  Patient Currently in Pain: Yes  Pain Assessment  Pain Level: 5  Pain Type: Acute pain  Pain Location: Abdomen  Pain Orientation: Right  Pain Descriptors:  (\"pulling\")  Pain Frequency: Intermittent  Clinical Progression: Gradually improving  Pain Intervention(s): Therapeutic presence; Ambulation/Increased activity  Response to Pain Intervention: Patient Satisfied  Vital Signs  Patient Currently in Pain: Yes       Orientation  Orientation  Overall Orientation Status: Within Normal Limits  Objective   Bed mobility  Rolling to Right: Minimal assistance  Supine to Sit: Moderate assistance (x2; BLE Support d/t lymphadema)  Sit to Supine: Moderate assistance (x2)  Scooting: Maximal assistance (x2)  Comments: Pt slight dizziness at EOB, subsided after ~1 minute  Transfers  Sit to Stand: Minimal Assistance  Stand to sit: Minimal Assistance  Comments: Pt able sit<>stand x2 Isabel  Ambulation  Ambulation?: Yes  Ambulation 1  Surface: level tile  Other Apparatus:  (IV pole follow)  Assistance: Minimum assistance  Quality of Gait: Waddled gait, wide CECILIA, Pt able to take few steps to bed side chair and back to bed  Distance: 5ft (to and from bedside chair)     Balance  Posture: Fair  Sitting - Static: Good  Sitting - Dynamic: Good;-  Standing - Static: Fair;+  Standing - Dynamic: Fair  Comments: standing balance assessed with RW    Exercises:  Quad sets, glut sets, ankle pumps, SLR, hip abd/add AAROM x10     Assessment   Body structures, Functions, Activity limitations: Decreased functional mobility ; Decreased strength;Decreased endurance;Decreased balance  Assessment: Pt able to amb 5ft total RW Isabel. Required mod Ax2 for bed mobility. In my opinion, pt would benefit from additional acute PT and is unsafe to return home at this time.    Prognosis: Good  REQUIRES PT FOLLOW UP: Yes  Activity Tolerance  Activity Tolerance: Patient limited by fatigue;Patient limited by endurance     Goals  Short term goals  Time

## 2018-02-27 NOTE — PROGRESS NOTES
Urology Progress Note      Subjective: Ellie Torres is a 57 y.o. female. His/Her current Diet is: DIET GENERAL;.    Since the previous note, the patient reports the following:  No acute issues overnight. Having some urinary urgency and frequency s/p bilateral stent placement yesterday. Voiding without difficulty. Vitals and Labs:  Vitals:    02/26/18 1922 02/26/18 2000 02/27/18 0006 02/27/18 0337   BP: 136/82  124/64 131/76   Pulse: 95 90 82 84   Resp: 20  19 16   Temp: 97.8 °F (36.6 °C)  98 °F (36.7 °C) 97.4 °F (36.3 °C)   TempSrc: Oral      SpO2: 98%  98% 98%   Weight:       Height:         I/O last 3 completed shifts: In: 3189 [P.O.:1075; I.V.:2114]  Out: 900 [Urine:900]    Recent Labs      02/25/18   0602   WBC  15.1*   HGB  11.9   HCT  40.1   MCV  99.8   PLT  332     Recent Labs      02/25/18   1429  02/26/18   0600  02/27/18   0600   NA  132*  134*  130*   K  4.7  4.6  4.7   CL  103  103  98   CO2  18*  18*  18*   BUN  43*  44*  49*   CREATININE  1.78*  1.66*  1.95*       Physical Exam:  NAD  A/O x 3  RRR  No accessory muscles of inspiration  Abdomen soft, non-tender, non-distended. No CVA tenderness. No calf pain. EPCs on. Machine turned on. Impression:    Patient Active Problem List   Diagnosis    Cellulitis of right lower extremity    Morbid obesity ([address]    Suspected deep tissue injury    Alteration in skin integrity due to moisture    Fungal disease    Hypothyroidism    JOCELINE (acute kidney injury) ([address]    Other ascites    Chronic acquired lymphedema    Dyspnea and respiratory abnormalities    Acute deep vein thrombosis (DVT) of proximal vein of lower extremity (HCC)    Adnexal mass    Malignant neoplasm of ovary ([address]    JOCELINE [facility]) ([facility]    C. difficile diarrhea    Clostridium difficile diarrhea    Vitamin D deficiency    Hydronephrosis, bilateral    Hyperkalemia       Plan:  S/p bilateral ureteral stent placement  Cr increased to 1.95 from 1.66.  Continue

## 2018-02-27 NOTE — PROGRESS NOTES
_       Progress Note               Date:                           2/27/2018  Patient name:           Radha Griffin  Date of admission:  2/22/2018 10:30 AM  MRN:   [id number]  YOB: 1955  PCP:                           Danii Lezama CNP        Subjective:     Feels better and stronger  VSS  S/p ureteral stents placement yesterday   Feeling better  Cr 1.95    HPI in Brief:   The patient is a 57 y.o.  female who is admitted to the hospital for generalized weakness. significant past medical history of pelvic mass concerning for ovarian cancer and asciitic fluid positive for non small cell carcinoma presented with abdominal pain, fatigue and ongoing diarrhea. She has chronic acquired lymphedema as well. Patient had been discharged from [facility] on 2/17/18 after diagnosis of Acute DVT. She is now on Eliquis for DVT. Patient states that since discharge from hospital she has had difficulty taking care of herself. She also complains of increasing fatigue and weakness. She states she is barely able to get off the couch to go to the restroom or to make food. She was found to have lactic acidosis and she was hypotensive and tachycardic and JOCELINE.     Patient had been admitted to [facility] on 1/24/18. At that time CT abdomen/pelvis was performed and demonstrated a pelvic mass measuring 13 cm likely ovarian in origin, bilateral pelvic lymphadenopathy, and large volume abdominal ascites concerning for malignancy. Chest xray was unremarkable.  TVUS was performed and demonstrated a large heterogenous mass-like area measuring up to 10.8 cm, an enlarged uterus measuring 12 cm, and thickened endometrium (16 mm) however the study was significantly limited secondary to patients body habitus. Patient underwent a IR guided paracentesis during which 6.2 L of fluid was removed. CA-125 was collected and found to be elevated at 151.  Ascitic fluid was positive for atypical cells compatible with nonsmall cell

## 2018-02-27 NOTE — PROGRESS NOTES
Renal Progress Note    Patient :  Radha Griffin; 57 y.o. MRN# [id number]  Location:  2652/6970-92  Attending:  Chary Huizar MD  Admit Date:  2018   Hospital Day: 5    Subjective:     Pt  seen and examined and bedside. She had bilateral ureteral stents placed yesterday. She continues to have diarrhea. By mouth intake is poor  Creatinine went up to 1.9 from 1.6 yesterday. Outpatient Medications:     Prescriptions Prior to Admission: lactobacillus (CULTURELLE) capsule, Take 1 capsule by mouth 2 times daily  metroNIDAZOLE (FLAGYL) 500 MG tablet, Take 1 tablet by mouth every 8 hours for 10 days  apixaban (ELIQUIS) 5 MG TABS tablet, Take 1 tablet by mouth 2 times daily  furosemide (LASIX) 20 MG tablet, TAKE 1 TABLET DAILY  potassium chloride (KLOR-CON M) 20 MEQ extended release tablet, TAKE 1 TABLET DAILY  potassium chloride (KLOR-CON M) 20 MEQ TBCR extended release tablet, take 1 tablet by mouth once daily    Current Medications:     Scheduled Meds:    metoprolol tartrate  12.5 mg Oral BID    levothyroxine  25 mcg Oral Daily    vitamin D  50,000 Units Oral Weekly    apixaban  5 mg Oral BID    sodium chloride flush  10 mL Intravenous 2 times per day    sodium chloride flush  10 mL Intravenous 2 times per day    vancomycin  125 mg Oral Q6H     Continuous Infusions:    sodium bicarbonate infusion 150 mEq (18 0627)     PRN Meds:  calcium carbonate, sodium chloride flush, acetaminophen, sodium chloride flush    Input/Output:       I/O last 3 completed shifts:   In: 2947 [P.O.:1265; I.V.:1682]  Out: 500 [Urine:500]    Vital Signs:   Temperature:  Temp: 97.4 °F (36.3 °C)  TMax:   Temp (24hrs), Av.7 °F (36.5 °C), Min:97.4 °F (36.3 °C), Max:98.2 °F (36.8 °C)    Respirations:  Resp: 17  Pulse:   Pulse: 97  BP:    BP: 122/84  BP Range: Systolic (09ZZF), TXT:535 , Min:109 , VSL:199       Diastolic (75ZDY), APZ:56, Min:64, Max:84      Physical Examination:     General:  awake  HEENT:  Atraumatic, 02/22/2018    YEAST NOT REPORTED 02/22/2018    BACTERIA NOT REPORTED 02/22/2018    SPECGRAV 1.027 02/22/2018    LEUKOCYTESUR SMALL 02/22/2018    UROBILINOGEN Normal 02/22/2018    BILIRUBINUR NEGATIVE  Verified by ictotest. 02/22/2018    GLUCOSEU NEGATIVE 02/22/2018    KETUA NEGATIVE 02/22/2018    AMORPHOUS NOT REPORTED 02/22/2018     Urine Sodium:     Lab Results   Component Value Date    KRISTIAN 20 02/22/2018     Urine Potassium:  No results found for: KUR  Urine Chloride:  No results found for: CLUR  Urine Osmolarity: No results found for: OSMOU  Urine Protein:   No results found for: TPU  Urine Creatinine:     Lab Results   Component Value Date    LABCREA 321.0 02/22/2018     Urine Eosinophils:  No components found for: UEOS    Radiology:     CXR: Reviewed      Assessment:     1. Acute Kidney Injury Secondary to ATN and obstructive uropathy nonresolution of last ATN. Looks like creatinine in January 2018 was up to 2.5 and has never resolved back to normal since then. In May 2017 and creatinine was 0.7 . Urine protein creatinine ratio was 0.1  creatinine was 1.9 on admission  2.  C. diff diarrhea  3. Mild Bilateral Hydronephrosis: Status post bilateral ureteral stents  4. Metabolic Acidosis  5. Malignant neoplasm of ovary  6. Chronic lymphedema  7. Recent DVT     Plan:   1. Cont bicarb gtt  2. Monitor input and output  3. BMP in a.m.  4.  Following along     Nutrition   Please ensure that patient is on a renal diet/TF. Avoid nephrotoxic drugs/contrast exposure.     Thank you for the consultation. Please do not hesitate to contact us for any further questions/concerns.  We will continue to follow along with you    Emerson Delgado MD  [facility]

## 2018-03-01 NOTE — PLAN OF CARE
Problem: Nutrition  Goal: Optimal nutrition therapy  Outcome: Ongoing  Nutrition Problem: Inadequate oral intake  Intervention: Food and/or Nutrient Delivery: Continue current diet - Provide ONS as/if needed. Nutritional Goals: Oral intakes to meet 75% of estimated nutrient needs.

## 2018-03-01 NOTE — PROGRESS NOTES
Nutrition Assessment    Type and Reason for Visit: Initial (LOS)    Nutrition Recommendations: Continue current diet - encourage intakes as tolerated. Will provide ONS as/if needed. Malnutrition Assessment:  · Malnutrition Status: At risk for malnutrition  · Context: Acute illness or injury  · Findings of the 6 clinical characteristics of malnutrition (Minimum of 2 out of 6 clinical characteristics is required to make the diagnosis of moderate or severe Protein Calorie Malnutrition based on AND/ASPEN Guidelines):  1. Energy Intake-Less than or equal to 75%, greater than 7 days    2. Weight Loss-No significant weight loss  3. Fat Loss-No significant subcutaneous fat loss  4. Muscle Loss-No significant muscle mass loss  5. Fluid Accumulation-Moderate to severe fluid accumulation, Extremities, Generalized    Nutrition Diagnosis:   · Problem: Inadequate oral intake  · Etiology: related to Current Condition/Abdominal Pain/Diarrhea     Signs and symptoms:  as evidenced by Diet history of poor intake, improving oral intakes    Nutrition Assessment:  · Subjective Assessment: Pt seen based on LOS. Admitted with c/o abdominal pain and nausea. Pt recently diagnosed with ovarian cancer. A week ago pt was dx with C.diff - diarrhea improving. Pt tolerating a General diet with improving oral intakes. S/p paracentesis yesterday with removal of 7.2L of fluid. Pt reports feeling better today. · Nutrition-Focused Physical Findings: +Active bowel sounds. +3 generalized, +2 BUE, and +4 BLE edema.   · Wound Type: Skin Tears - Left lower leg; areas of redness and excoriation  · Current Nutrition Therapies:  · Oral Diet Orders: General   · Oral Diet intake: 51-75%  · Oral Nutrition Supplement (ONS) Orders: None  · Anthropometric Measures:  · Ht: 5' 3\" (160 cm)   · Current Body Wt: 398 lb (180.5 kg) (bed scale)  · Admission Body Wt: 357 lb (161.9 kg) (stated)  · Ideal Body Wt: 115 lb (52.2 kg), % Ideal Body 346%  · Adjusted

## 2018-03-01 NOTE — PROGRESS NOTES
carbonate, sodium chloride flush, acetaminophen, sodium chloride flush  Continuous Infusions:   sodium bicarbonate infusion 150 mEq (02/28/18 1225)       Objective:   Vitals: BP (!) 88/48   Pulse 85   Temp 98.1 °F (36.7 °C) (Oral)   Resp 14   Ht 5' 3\" (1.6 m)   Wt (!) 398 lb (180.5 kg)   SpO2 100%   BMI 70.50 kg/m²     General appearance - ill appearing, not in pain or distress, not jaundiced   Mental status - alert and cooperative   Eyes - pupils equal and reactive, extraocular eye movements intact   Mouth - mucous membranes moist, pharynx normal without lesions,   Neck - supple, no palpable  adenopathy , trach midline   Lymphatics - no palpable lymphadenopathy, no hepatosplenomegaly   Chest - clear to auscultation, no wheezes, rales or rhonchi, symmetric air entry , normal chest expansion  Heart - normal rate, regular rhythm, normal S1, S2, no murmurs,  Abdomen - soft, nontender, distended, fluid thrill+, no masses or organomegaly   Neurological - alert, oriented, normal speech, no focal findings or movement disorder noted   Musculoskeletal - no joint tenderness, deformity or swelling   Extremities - peripheral pulses normal, b/l LE lymphedema+, no clubbing or cyanosis   Skin - normal coloration and turgor, no rashes, no suspicious skin lesions noted ,   Port: site of port not red, no tenderness    Data:  I/O this shift:  In: 1307 [P.O.:750; I.V.:557]  Out: -   In: 9537 [P.O.:1150;  I.V.:1430]  Out: -   CBC:   Recent Labs      02/27/18   0600  02/28/18   1522   WBC  16.1*  16.6*   HGB  11.4*  11.7*   PLT  339  270     BMP:    Recent Labs      02/26/18   0600  02/27/18   0600  02/28/18   1142   NA  134*  130*  132*   K  4.6  4.7  4.8   CL  103  98  97*   CO2  18*  18*  21   BUN  44*  49*  56*   CREATININE  1.66*  1.95*  2.35*   GLUCOSE  104*  127*  124*     Hepatic:   Recent Labs      02/28/18   1142   AST  25   ALT  6   BILITOT  <0.10*   ALKPHOS  39     INR:   No results for input(s): INR in the last 72

## 2018-03-01 NOTE — PROGRESS NOTES
Renal Progress Note    Patient :  Laura Johnson; 57 y.o. MRN# [id number]  Location:  3097/6944-38  Attending:  No att. providers found  Admit Date:  2018   Hospital Day: 7    Subjective:     Patient seen and examined. She underwent large-volume paracenteses yesterday and more than 7 L of fluid was removed. Patient said that she is feeling better. She denies any increase in abdominal pain. There is no history of nausea vomiting diarrhea hematemesis or melena. There is no history of chest pain PND or orthopnea. Outpatient Medications:     Prescriptions Prior to Admission: lactobacillus (CULTURELLE) capsule, Take 1 capsule by mouth 2 times daily  [] metroNIDAZOLE (FLAGYL) 500 MG tablet, Take 1 tablet by mouth every 8 hours for 10 days  apixaban (ELIQUIS) 5 MG TABS tablet, Take 1 tablet by mouth 2 times daily  furosemide (LASIX) 20 MG tablet, TAKE 1 TABLET DAILY  potassium chloride (KLOR-CON M) 20 MEQ extended release tablet, TAKE 1 TABLET DAILY  potassium chloride (KLOR-CON M) 20 MEQ TBCR extended release tablet, take 1 tablet by mouth once daily    Current Medications:     Scheduled Meds:    [START ON 3/2/2018] levothyroxine  50 mcg Oral Daily    vitamin D  50,000 Units Oral Weekly    apixaban  5 mg Oral BID    sodium chloride flush  10 mL Intravenous 2 times per day    sodium chloride flush  10 mL Intravenous 2 times per day    vancomycin  125 mg Oral Q6H     Continuous Infusions:    sodium bicarbonate infusion       PRN Meds:  calcium carbonate, sodium chloride flush, acetaminophen, sodium chloride flush    Input/Output:       I/O last 3 completed shifts: In: 2535.5 [P.O.:1150;  I.V.:1385.5]  Out: -     Vital Signs:   Temperature:  Temp: 97.7 °F (36.5 °C)  TMax:   Temp (24hrs), Av.8 °F (36.6 °C), Min:97.7 °F (36.5 °C), Max:98.1 °F (36.7 °C)    Respirations:  Resp: 15  Pulse:   Pulse: 96  BP:    BP: (!) 100/52  BP Range: Systolic (40GYH), XTM:51 , Min:88 , LKK:954       Diastolic (24hrs), Av, Min:48, Max:64      Physical Examination:     General:    alert and oriented ×3. Lying flat and comfortable  Eyes:   Pupil reactive to light  Neck:   No JVD  Chest:   Air entry fair bilaterally  Cardiac: S1 S2 RR   Abdomen:  Soft, non-tender, no masses or organomegally appreciable, BS sluggish. :   No suprapubic or flank tenderness. Skin:   No rashes, good skin turgor. Extremities:  Significant lower extremity edema which is chronic in nature    Emergency     Recent Labs      18   0600  18   1522   WBC  16.1*  16.6*   RBC  3.96  4.01   HGB  11.4*  11.7*   HCT  36.9  35.7*   MCV  93.2  89.0   MCH  28.8  29.2   MCHC  30.9  32.8   RDW  15.0*  14.9*   PLT  339  270   MPV  9.5  8.7      BMP:   Recent Labs      18   0600  18   1142  18   1139   NA  130*  132*  136   K  4.7  4.8  4.6   CL  98  97*  98   CO2  18*  21  22   BUN  49*  56*  52*   CREATININE  1.95*  2.35*  1.89*   GLUCOSE  127*  124*  96   CALCIUM  7.7*  7.5*  7.3*      Phosphorus:   No results for input(s): PHOS in the last 72 hours. pth     Lab Results   Component Value Date    IPTH 182.9 2018     Magnesium:  No results for input(s): MG in the last 72 hours.   Albumin:    Recent Labs      18   1142   LABALBU  1.8*     BNP:    No results found for: BNP  FEMI:    No results found for: FEMI  SPEP:  Lab Results   Component Value Date    PROT 5.1 2018       Urinalysis/Chemistries:      Lab Results   Component Value Date    NITRU POSITIVE 2018    COLORU DARK YELLOW 2018    PHUR 5.0 2018    WBCUA 10 TO 20 2018    RBCUA 2 TO 5 2018    MUCUS NOT REPORTED 2018    TRICHOMONAS NOT REPORTED 2018    YEAST NOT REPORTED 2018    BACTERIA NOT REPORTED 2018    SPECGRAV 1.027 2018    LEUKOCYTESUR SMALL 2018    UROBILINOGEN Normal 2018    BILIRUBINUR NEGATIVE  Verified by ictotest. 2018    GLUCOSEU NEGATIVE 2018    KETUA NEGATIVE 02/22/2018    AMORPHOUS NOT REPORTED 02/22/2018     Urine Sodium:     Lab Results   Component Value Date    KRISTIAN 20 02/22/2018     Radiology:     CXR: Reviewed      Assessment:     1. Acute Kidney Injury Secondary to ATN and obstructive uropathy nonresolution of last ATN. Creatinine is improving and down to 1.8 mg/dL. 2.  Malignant ascites underwent laboratory volume paracenteses yesterday  3. Mild Bilateral Hydronephrosis: Status post stent placement    4. Metabolic Acidosis: Bicarbonate improved and most recent is 22   5. Malignant neoplasm of ovary  6. Chronic lymphedema  7. Recent DVT     Plan:   1. Decrease IV fluids down to 25 mL per hour  2. BMP in a.m.  3.  Start oral Lasix    Nutrition   Please ensure that patient is on a renal diet/TF. Avoid nephrotoxic drugs/contrast exposure.     Thank you for the consultation. Please do not hesitate to contact us for any further questions/concerns.  We will continue to follow along with you    Bear Patiño MD  [facility]

## 2018-03-02 NOTE — PROGRESS NOTES
Urology Progress Note      Subjective: Barak Varma is a 57 y.o. female. His/Her current Diet is: DIET GENERAL;.    Since the previous note, the patient reports the following:  No acute issues overnight. Patient now POD #4 s/p bilateral ureteral stent placement for extrinsic obstruction from pelvic mass  Denies flank pain. No fevers, chills, chest pain, SOB. Vitals and Labs:  Vitals:    03/01/18 1517 03/01/18 1645 03/01/18 2322 03/02/18 0451   BP:  108/71 93/66 (!) 93/54   Pulse:  92 97 94   Resp:  14 15 14   Temp:  97.4 °F (36.3 °C) 98 °F (36.7 °C) 98.1 °F (36.7 °C)   TempSrc:  Oral Oral Oral   SpO2:  100% 99% 98%   Weight:       Height: 5' 3\" (1.6 m)        I/O last 3 completed shifts: In: 981 [I.V.:981]  Out: 1550 [Urine:1550]    Recent Labs      02/28/18   1522  03/02/18   0732   WBC  16.6*  15.9*   HGB  11.7*  10.7*   HCT  35.7*  34.4*   MCV  89.0  94.2   PLT  270  264     Recent Labs      02/28/18   1142  03/01/18   1139  03/02/18   0732   NA  132*  136  133*   K  4.8  4.6  5.0   CL  97*  98  100   CO2  21  22  22   BUN  56*  52*  49*   CREATININE  2.35*  1.89*  1.59*       Physical Exam:  NAD  A/O x 3  RRR  No accessory muscles of inspiration  Abdomen soft, non-tender, non-distended. No CVA tenderness. No calf pain. EPCs on. Machine turned on. Imaging:   Renal US 3/1/18:   Bilateral hydronephrosis, increased and now moderate on the right, and   unchanged and still mild on the left.  Small amount of ascites s/p recent   paracentesis.  Known pelvic mass.        Impression:    Patient Active Problem List   Diagnosis    Cellulitis of right lower extremity    Morbid obesity ([address]    Suspected deep tissue injury    Alteration in skin integrity due to moisture    Fungal disease    Hypothyroidism    JOCELINE (acute kidney injury) ([address]    Other ascites    Chronic acquired lymphedema    Dyspnea and respiratory abnormalities    Acute deep vein thrombosis (DVT) of proximal vein of lower extremity

## 2018-03-02 NOTE — PROGRESS NOTES
[address] [facility] [address]  Occupational Therapy Not Seen Note    Patient not available for Occupational Therapy due to:    [] Testing:    [] Hemodialysis    [] Blood Transfusion in Progress    [x]Refusal by Patient: Pt adamantly refused occupational therapy this PM . Pt angrily expressed not wanting to participate in therapy d/t being \"too tired\" and \"not feeling well\". Pt requested to be left alone after S/OT educated pt on the benefits of therapy and activity. S/OT discussed with pt multiple refusals and discontinuing OT at this time. Pt pleasantly agreeable. Discussed with RN .    [] Surgery/Procedure:    [] Strict Bedrest    [] Sedation    [] Spine Precautions     [] Pt being transferred to palliative care at this time. Spoke with pt/family and OT services to be defered. [] Pt independent with functional mobility and functional tasks. Pt with no OT acute care needs at this time, will defer OT eval.    [] Other    Comments: Pt will be discharged from OT services at this time d/t non compliance with therapy.      Signature: Pavithra Champion S/OT

## 2018-03-02 NOTE — PROGRESS NOTES
calcium carbonate, sodium chloride flush, acetaminophen, sodium chloride flush  Continuous Infusions:      Objective:   Vitals: /71   Pulse 92   Temp 97.4 °F (36.3 °C) (Oral)   Resp 14   Ht 5' 3\" (1.6 m)   Wt (!) 398 lb (180.5 kg)   SpO2 100%   BMI 70.50 kg/m²     General appearance - ill appearing, not in pain or distress, not jaundiced   Mental status - alert and cooperative   Eyes - pupils equal and reactive, extraocular eye movements intact   Mouth - mucous membranes moist, pharynx normal without lesions,   Neck - supple, no palpable  adenopathy , trach midline   Lymphatics - no palpable lymphadenopathy, no hepatosplenomegaly   Chest - clear to auscultation, no wheezes, rales or rhonchi, symmetric air entry , normal chest expansion  Heart - normal rate, regular rhythm, normal S1, S2, no murmurs,  Abdomen - soft, nontender, distended, fluid thrill+, no masses or organomegaly   Neurological - alert, oriented, normal speech, no focal findings or movement disorder noted   Musculoskeletal - no joint tenderness, deformity or swelling   Extremities - peripheral pulses normal, b/l LE lymphedema+, no clubbing or cyanosis   Skin - normal coloration and turgor, no rashes, no suspicious skin lesions noted ,   Port: site of port not red, no tenderness    Data:  I/O this shift: In: 981 [I.V.:981]  Out: 450 [Urine:450]  In: 2209.5 [P.O.:400; I.V.:1809.5]  Out: 450 [Urine:450]  CBC:   Recent Labs      02/27/18   0600  02/28/18   1522   WBC  16.1*  16.6*   HGB  11.4*  11.7*   PLT  339  270     BMP:    Recent Labs      02/27/18   0600  02/28/18   1142  03/01/18   1139   NA  130*  132*  136   K  4.7  4.8  4.6   CL  98  97*  98   CO2  18*  21  22   BUN  49*  56*  52*   CREATININE  1.95*  2.35*  1.89*   GLUCOSE  127*  124*  96     Hepatic:   Recent Labs      02/28/18   1142   AST  25   ALT  6   BILITOT  <0.10*   ALKPHOS  39     INR:   No results for input(s): INR in the last 72 hours.     IMAGING DATA:      Assessment

## 2018-03-03 NOTE — PROGRESS NOTES
Internal Medicine Residency Program  Inpatient Daily Progress Note  ______________________________________________________________________________    Patient: Judie Garay  YOB: 1955   MRN: [id number]    Acct: [de-identified]     Admit date: 2/22/2018  Today's date: 03/03/18  Number of days in the hospital: 9  Expected Discharge Date: 02/26/18    Admitting Diagnosis: JOCELINE (acute kidney injury) ([address]    Subjective:   Pt seen and Chart reviewed. Patient remained hemodynamically stable over the last 24 hours. Urine output sub optimal but not monitoring strictly as she is incontinent. Due to her bilateral chronic lymphedema she is bed bound  Tolerating general diet well. Objective:   Vital Sign:  /66   Pulse 102   Temp 97.6 °F (36.4 °C) (Oral)   Resp 20   Ht 5' 3\" (1.6 m)   Wt (!) 398 lb (180.5 kg)   SpO2 99%   BMI 70.50 kg/m²       Physical Exam:  General appearance:   alert, well appearing, and in no distress  Mental Status: alert, oriented to person, place, and time  Neurologic:  alert, oriented, normal speech, no focal findings or movement disorder noted  Lungs:  clear to auscultation, no wheezes, rales or rhonchi, symmetric air entry  Heart[de-identified] normal rate, regular rhythm, normal S1, S2, no murmurs, rubs, clicks or gallops  Abdomen:  soft, mild tenderness, nondistended, no masses or organomegaly  Extremities: Bilateral lower extremity lymphedema.    Skin: normal coloration and turgor, no rashes, no suspicious skin lesions noted    Medications:  Scheduled Medications   midodrine  5 mg Oral TID WC    levothyroxine  50 mcg Oral Daily    furosemide  40 mg Oral Daily    vitamin D  50,000 Units Oral Weekly    apixaban  5 mg Oral BID    sodium chloride flush  10 mL Intravenous 2 times per day    sodium chloride flush  10 mL Intravenous 2 times per day    vancomycin  125 mg Oral Q6H       PRN Medications  calcium carbonate 500 mg BID PRN   sodium chloride flush 10 mL PRN acetaminophen 650 mg Q4H PRN   sodium chloride flush 10 mL PRN       Diagnostic Labs and Imaging:  CBC:  Recent Labs      02/28/18   1522  03/02/18   0732  03/03/18   0840   WBC  16.6*  15.9*  18.8*   HGB  11.7*  10.7*  11.3*   PLT  270  264  256     BMP: Recent Labs      03/01/18   1139  03/02/18   0732  03/03/18   0840   NA  136  133*  132*   K  4.6  5.0  4.7   CL  98  100  97*   CO2  22  22  25   BUN  52*  49*  50*   CREATININE  1.89*  1.59*  1.70*   GLUCOSE  96  91  112*     Hepatic: No results for input(s): AST, ALT, ALB, BILITOT, ALKPHOS in the last 72 hours. Assessment and Plan:           Hypotension: Resolved blood pressure controlled on midodrine 5 mg 3 times a day. Echocardiogram unremarkable. Cortisol 14. 6.     Ovarian cancer and carcinomas. Malignant ascites  Oncology on board and plan for outpatient chemotherapy     Severe C. Diff colitis. - diarrhea has resolved  Continue vancomycin 125 mg every 6 hours for 14 days. Today is day 9.     Acute kidney injury. Multifactorial mainly from obstruction of ureters. Dehydration. Baseline normal in 52870. Serum creatinine improving. Patient has been refusing repeat urine analysis. We'll try to talk to the patient again today. Status post bilateral ureteral stent placement on 2/27/2018  Per urology, repeat ultrasound renal suboptimal due to body habitus and they looked at renal ultrasound films and didn't don't think that there is any increased hydronephrosis as compared to prior imaging on 22418. Left lower extremity DVT; continue Eliquis. Discharge planning to nursing home. Waiting for placement. Raphael Frye MD  Internal Medicine Resident   [facility]    Attending Physician Statement  I have discussed the care of Maury Segura, including pertinent history and exam findings with the resident. I have reviewed the key elements of all parts of the encounter with the resident.  I have seen and

## 2018-03-03 NOTE — PROGRESS NOTES
_  _       Progress Note               Date:                           3/2/2018  Patient name:           Sasha Jarvis  Date of admission:  2/22/2018 10:30 AM  MRN:   [id number]  YOB: 1955  PCP:                           January Mccall CNP        Subjective:     Feels better and stronger  VSS  S/p ureteral stents placement   Feeling better      HPI in Brief:   The patient is a 57 y.o.  female who is admitted to the hospital for generalized weakness. significant past medical history of pelvic mass concerning for ovarian cancer and asciitic fluid positive for non small cell carcinoma presented with abdominal pain, fatigue and ongoing diarrhea. She has chronic acquired lymphedema as well. Patient had been discharged from [facility] on 2/17/18 after diagnosis of Acute DVT. She is now on Eliquis for DVT. Patient states that since discharge from hospital she has had difficulty taking care of herself. She also complains of increasing fatigue and weakness. She states she is barely able to get off the couch to go to the restroom or to make food. She was found to have lactic acidosis and she was hypotensive and tachycardic and JOCELINE.     Patient had been admitted to [facility] on 1/24/18. At that time CT abdomen/pelvis was performed and demonstrated a pelvic mass measuring 13 cm likely ovarian in origin, bilateral pelvic lymphadenopathy, and large volume abdominal ascites concerning for malignancy. Chest xray was unremarkable.  TVUS was performed and demonstrated a large heterogenous mass-like area measuring up to 10.8 cm, an enlarged uterus measuring 12 cm, and thickened endometrium (16 mm) however the study was significantly limited secondary to patients body habitus. Patient underwent a IR guided paracentesis during which 6.2 L of fluid was removed. CA-125 was collected and found to be elevated at 151.  Ascitic fluid was positive for atypical cells compatible with nonsmall cell carcinoma

## 2018-03-03 NOTE — PLAN OF CARE
Problem: Risk for Impaired Skin Integrity  Goal: Tissue integrity - skin and mucous membranes  Structural intactness and normal physiological function of skin and  mucous membranes. Outcome: Ongoing  Patient remains at risk for impaired skin and tissue integrity due to decreased mobility. Frequent turns and and assessments have been performed throughout this shift and no new areas of concern have been noted. Will continue to monitor.

## 2018-03-04 NOTE — PROGRESS NOTES
carbonate 500 mg BID PRN   sodium chloride flush 10 mL PRN   acetaminophen 650 mg Q4H PRN   sodium chloride flush 10 mL PRN       Diagnostic Labs and Imaging:  CBC:  Recent Labs      03/02/18   0732  03/03/18   0840  03/04/18   0541   WBC  15.9*  18.8*  16.4*   HGB  10.7*  11.3*  10.4*   PLT  264  256  261     BMP:   Recent Labs      03/02/18   0732  03/03/18   0840  03/04/18   0541   NA  133*  132*  130*   K  5.0  4.7  4.7   CL  100  97*  96*   CO2  22  25  21   BUN  49*  50*  52*   CREATININE  1.59*  1.70*  2.04*   GLUCOSE  91  112*  96     Hepatic:   No results for input(s): AST, ALT, ALB, BILITOT, ALKPHOS in the last 72 hours. Assessment and Plan:   Patient admitted with a JOCELINE secondary to dehydration. Recent diagnosis of pelvic mass measuring 13 cm likely ovarian in origin with bilateral pelvic lymphadenopathy and bilateral mild hydronephrosis seen on renal ultrasound. Patient had bilateral ureteral stent placement. Renal ultrasound on 3/1/2018: Bilateral hydronephrosis, increased and now moderate on the right, and  unchanged and still mild on the left.  Small amount of ascites s/p recent  paracentesis.  Known pelvic mass. Hypotension- resolved  Patient has history of hypothyroidism and is on 25 mg of Synthroid at home  Synthroid 50 mg daily   cortisol level normal  Continue ProAmatine 5 mg 3 times a day  Repeat TSH in 1 week  2-D echo: left ejection fraction above 55%    Ovarian cancer and carcinomas. Malignant ascites  Oncology on board and plan for outpatient chemotherapy    Severe C. Diff colitis. - diarrhea has resolved  Continue vancomycin 125 mg every 6 hours for 14 days    JOCELINE- multifactorial secondary to dehydration and Lasix use, obstruction of the ureters. Baseline was normal 5/23/17  Creatinine 2.04 today  Status post bilateral ureteral stent placement on 2/27/2018  Urology on board, and the Smith catheter placement for now. We'll follow-up on creatinine level tomorrow.   Urology considering PCNT placement if renal function continues to decline  Started on gentle IV fluid hydration at 75 mL per hour    Metabolic acidosis. Resolved         Catalina See MD     Department of Internal Medicine  [address] [place] [address]         3/4/2018, 11:31 AM    Attending Physician Statement  I have discussed the care of Beau Roman, including pertinent history and exam findings with the resident. I have reviewed the key elements of all parts of the encounter with the resident. I have seen and examined the patient with the resident and the key elements of all parts of the encounter have been performed by me.         Donald Valencia MD  Attending and Faculty Internal Medicine  [address] [facility]  Internal Medicine [facility]   3/4/18

## 2018-03-04 NOTE — PROGRESS NOTES
turgor. Extremities:  +++ pitting edema, no clubbing, No cyanosis  Neuro:  AAO x 3, No FND. Labs:       Recent Labs      03/02/18   0732  03/03/18   0840  03/04/18   0541   WBC  15.9*  18.8*  16.4*   RBC  3.65*  3.86*  3.61*   HGB  10.7*  11.3*  10.4*   HCT  34.4*  34.5*  34.1*   MCV  94.2  89.4  94.5   MCH  29.3  29.3  28.8   MCHC  31.1  32.8  30.5   RDW  15.0*  15.0*  14.8*   PLT  264  256  261   MPV  9.2  9.7  9.7      BMP:   Recent Labs      03/02/18   0732  03/03/18   0840  03/04/18   0541   NA  133*  132*  130*   K  5.0  4.7  4.7   CL  100  97*  96*   CO2  22  25  21   BUN  49*  50*  52*   CREATININE  1.59*  1.70*  2.04*   GLUCOSE  91  112*  96   CALCIUM  7.9*  7.7*  7.5*      Phosphorus:   No results for input(s): PHOS in the last 72 hours. Magnesium:  No results for input(s): MG in the last 72 hours. Albumin:    No results for input(s): LABALBU in the last 72 hours. BNP:    No results found for: BNP  PTH:     Lab Results   Component Value Date    IPTH 182.9 02/23/2018     Blood cultures:  No results found for: [facility]    Urinalysis/Chemistries:      Lab Results   Component Value Date    NITRU NEGATIVE 03/04/2018    COLORU YELLOW 03/04/2018    PHUR 5.0 03/04/2018    WBCUA TOO NUMEROUS TO COUNT 03/04/2018    RBCUA TOO NUMEROUS TO COUNT 03/04/2018    MUCUS NOT REPORTED 03/04/2018    TRICHOMONAS NOT REPORTED 03/04/2018    YEAST NOT REPORTED 03/04/2018    BACTERIA NOT REPORTED 03/04/2018    SPECGRAV 1.017 03/04/2018    LEUKOCYTESUR LARGE 03/04/2018    UROBILINOGEN Normal 03/04/2018    BILIRUBINUR NEGATIVE 03/04/2018    GLUCOSEU NEGATIVE 03/04/2018    KETUA NEGATIVE 03/04/2018    AMORPHOUS NOT REPORTED 03/04/2018       2D echo   FINDINGS  Left Atrium  Left atrium is normal in size. Aneurysmal interatrial septum. Inter-atrial septum appears so be intact. Left Ventricle  Normal left ventricular ejection fraction (>55%). Right Atrium  Right atrium is normal in size.   Right Ventricle  Normal right ventricular size and function. Mitral Valve  Mitral valve is not well visualized. No evidence of mitral stenosis or mitral regurgitation. Aortic Valve  Aortic valve was not well visualized. Tricuspid Valve  Tricuspid valve was not well visualized. Pericardial Effusion  No significant pericardial effusion is seen. Miscellaneous  IVC normal diameter & inspiratory collapse indicating normal RA filling  pressure . Technically limited study. Radiology:     r/w    Assessment:     1. Acute Kidney Injury Secondary to ATN and obstructive uropathy. Renal function initially improved but now worsening.- Secondary to obstructive uropathy,likely not dehydration. Ultrasound shows increase hydronephrosis. 2.  Ascites status post large-volume paracenteses  3. Hydronephrosis status post stent placement  4. Metabolic Acidosis:improved  5. Malignant neoplasm of ovary  6. Chronic lymphedema with worsening edema  7. Recent DVT - On Eliquis   8. UTI - U/A + leukocyte esterase and RBCs - Start rocephin  9. Recent  C. Diff on by mouth vancomycin     Plan:     1. Daily BMP. 2. Follow up on urine culture  3. May need percutaneous nephrostomies if renal function continues to worsen. Awaiting urology decision  4. Lasix 40 mg IV x1  5. Stop IV fluids  6. Start Rocephin     Nutrition   Renal Diet/TF    Discuss with Dr. Roxane Delaney [facility] student Jess Bond      Attending Physician Statement  I have discussed the care of Jovana Wei, including pertinent history and exam findings with the resident/fellow. I have reviewed the key elements of all parts of the encounter with the resident/fellow. I have seen and examined the patient with the resident/fellow. I agree with the assessment and plan and status of the problem list as documented.     Zainab Cabral MD

## 2018-03-04 NOTE — PROGRESS NOTES
again increases tomorrow, may need B/L PCNT placement. Appreciate nephrology recs. Will request primary team hold eliquis (on for DVT). May transition to heparin infusion. Iva Stinson  8:38 AM 3/4/2018  Agree with above.

## 2018-03-05 NOTE — PROGRESS NOTES
Urology Update:    Spoke with Dr. Lorraine Castillo in Interventional Radiology. IR will not place PCNTs in patient. He stated that due to the distance from her skin to collecting system, if he was able to get PCNT into the collecting system they would not stay in place. He recommended we consider changing for larger ureteral stents if there is concern for stent obstruction. OK to re-start full anticoagulation and diet. Will discuss with Dr. Rebecca Mckenna possible stent change in the future.      Saman Rivera MD  PGY-5, Urology  [phone]

## 2018-03-05 NOTE — PROGRESS NOTES
Urology called regarding pt Eliquis. Urology wants Eliquis held. If starting heparin gtt, urology says heparin has to be held 6 hours before going to IR for possible percutaneous nephrostomies. Internal medicine resident notified. Dr Ralph Rukcer ordered heparin drip.       Mayra Castano RN

## 2018-03-05 NOTE — PLAN OF CARE
Problem: Risk for Impaired Skin Integrity  Goal: Tissue integrity - skin and mucous membranes  Structural intactness and normal physiological function of skin and  mucous membranes. Outcome: Ongoing  Skin assessment complete. No breakdown noted. Interventions in place. See doc flowsheet for interventions initiated. Pt encouraged and assisted to turn. Will continue to monitor for additional needs and skin breakdown.

## 2018-03-05 NOTE — PROGRESS NOTES
_  _       Progress Note               Date:                           3/4/2018  Patient name:           Barak Varma  Date of admission:  2/22/2018 10:30 AM  MRN:   [id number]  YOB: 1955  PCP:                           Lorena Gastelum CNP        Subjective:   Feels better and stronger  VSS  She had mei cath placement  Feels tired  NO Fever chills  NO Pain, but slight discomfort  HPI in Brief:   The patient is a 57 y.o.  female who is admitted to the hospital for generalized weakness. significant past medical history of pelvic mass concerning for ovarian cancer and asciitic fluid positive for non small cell carcinoma presented with abdominal pain, fatigue and ongoing diarrhea. She has chronic acquired lymphedema as well. Patient had been discharged from [facility] on 2/17/18 after diagnosis of Acute DVT. She is now on Eliquis for DVT. Patient states that since discharge from hospital she has had difficulty taking care of herself. She also complains of increasing fatigue and weakness. She states she is barely able to get off the couch to go to the restroom or to make food. She was found to have lactic acidosis and she was hypotensive and tachycardic and JOCELINE.     Patient had been admitted to [facility] on 1/24/18. At that time CT abdomen/pelvis was performed and demonstrated a pelvic mass measuring 13 cm likely ovarian in origin, bilateral pelvic lymphadenopathy, and large volume abdominal ascites concerning for malignancy. Chest xray was unremarkable.  TVUS was performed and demonstrated a large heterogenous mass-like area measuring up to 10.8 cm, an enlarged uterus measuring 12 cm, and thickened endometrium (16 mm) however the study was significantly limited secondary to patients body habitus. Patient underwent a IR guided paracentesis during which 6.2 L of fluid was removed. CA-125 was collected and found to be elevated at 151.  Ascitic fluid was positive for atypical cells flush  10 mL Intravenous 2 times per day    vancomycin  125 mg Oral Q6H     PRN Medications: heparin (porcine), heparin (porcine), calcium carbonate, sodium chloride flush, acetaminophen, sodium chloride flush  Continuous Infusions:   heparin (porcine)         Objective:   Vitals: /80   Pulse 102   Temp 98 °F (36.7 °C) (Oral)   Resp 19   Ht 5' 3\" (1.6 m)   Wt (!) 387 lb 9.6 oz (175.8 kg)   SpO2 97%   BMI 68.66 kg/m²     General appearance - ill appearing, not in pain or distress, not jaundiced   Mental status - alert and cooperative   Eyes - pupils equal and reactive, extraocular eye movements intact   Mouth - mucous membranes moist, pharynx normal without lesions,   Neck - supple, no palpable  adenopathy , trach midline   Lymphatics - no palpable lymphadenopathy, no hepatosplenomegaly   Chest - clear to auscultation, no wheezes, rales or rhonchi, symmetric air entry , normal chest expansion  Heart - normal rate, regular rhythm, normal S1, S2, no murmurs,  Abdomen - soft, nontender, distended, fluid thrill+, no masses or organomegaly   Neurological - alert, oriented, normal speech, no focal findings or movement disorder noted   Musculoskeletal - no joint tenderness, deformity or swelling   Extremities - peripheral pulses normal, b/l LE lymphedema+, no clubbing or cyanosis   Skin - normal coloration and turgor, no rashes, no suspicious skin lesions noted ,   Port: site of port not red, no tenderness    Data:  I/O this shift: In: 80 [P.O.:750; I.V.:30]  Out: 700 [Urine:700]  In: 1280 [P.O.:1230;  I.V.:50]  Out: 1100 [Urine:1100]  CBC:   Recent Labs      03/02/18   0732  03/03/18   0840  03/04/18   0541   WBC  15.9*  18.8*  16.4*   HGB  10.7*  11.3*  10.4*   PLT  264  256  261     BMP:    Recent Labs      03/02/18   0732  03/03/18   0840  03/04/18   0541   NA  133*  132*  130*   K  5.0  4.7  4.7   CL  100  97*  96*   CO2  22  25  21   BUN  49*  50*  52*   CREATININE  1.59*  1.70*  2.04*   GLUCOSE  91 112*  96     Hepatic:   No results for input(s): AST, ALT, ALB, BILITOT, ALKPHOS in the last 72 hours. INR:   No results for input(s): INR in the last 72 hours. IMAGING DATA:  REviewed    Assessment      1. Ovarian cancer and carcinomatosis. Malignant ascites. 2. Lower extremity proximal DVT on Eliquis. 3. Morbid obesity. 4. Bilateral lower extremity lymphedema. 5. Severe clostridium difficile colitis. 6.  JOCELINE  7. B/l hydronephrosis from external compression of ureter from adnexal mass s/p b/l ureteral stents placement 2/26/18. 8. Recent C diff colitis  9. Leukocytosis  10. Mild anemia    Plan   Creat slightly up from 1.7 to 2  Nephrology on board  Plan to see the patient outpatient for chemotherapy. Please do not hesitate to call us if any questions or concerns. Will follow as needed.        Luis Serrano MD  Hematologist/Medical Oncologist    Cell: [phone]      This note is created with the assistance of a speech recognition program.  While intending to generate a document that actually reflects the content of the visit, the document can still have some errors including those of syntax and sound a like substitutions which may escape proof reading. It such instances, actual meaning can be extrapolated by contextual diversion.

## 2018-03-05 NOTE — PROGRESS NOTES
wave  SKIN:  No rashes, good skin turgor. Extremities:  +++ pitting edema, no clubbing, No cyanosis  Neuro:  AAO x 3, No FND. Labs:       Recent Labs      03/03/18   0840  03/04/18   0541  03/05/18   0609   WBC  18.8*  16.4*  17.4*   RBC  3.86*  3.61*  3.74*   HGB  11.3*  10.4*  11.0*   HCT  34.5*  34.1*  34.5*   MCV  89.4  94.5  92.2   MCH  29.3  28.8  29.4   MCHC  32.8  30.5  31.9   RDW  15.0*  14.8*  14.8*   PLT  256  261  311   MPV  9.7  9.7  9.9      BMP:   Recent Labs      03/03/18   0840  03/04/18   0541  03/05/18   0609   NA  132*  130*  130*   K  4.7  4.7  4.9   CL  97*  96*  95*   CO2  25  21  21   BUN  50*  52*  59*   CREATININE  1.70*  2.04*  2.20*   GLUCOSE  112*  96  110*   CALCIUM  7.7*  7.5*  7.8*      Phosphorus:   No results for input(s): PHOS in the last 72 hours. Magnesium:  No results for input(s): MG in the last 72 hours. Albumin:    No results for input(s): LABALBU in the last 72 hours. BNP:    No results found for: BNP  PTH:     Lab Results   Component Value Date    IPTH 182.9 02/23/2018     Blood cultures:  No results found for: [facility]    Urinalysis/Chemistries:      Lab Results   Component Value Date    NITRU NEGATIVE 03/04/2018    COLORU YELLOW 03/04/2018    PHUR 5.0 03/04/2018    WBCUA TOO NUMEROUS TO COUNT 03/04/2018    RBCUA TOO NUMEROUS TO COUNT 03/04/2018    MUCUS NOT REPORTED 03/04/2018    TRICHOMONAS NOT REPORTED 03/04/2018    YEAST NOT REPORTED 03/04/2018    BACTERIA NOT REPORTED 03/04/2018    SPECGRAV 1.017 03/04/2018    LEUKOCYTESUR LARGE 03/04/2018    UROBILINOGEN Normal 03/04/2018    BILIRUBINUR NEGATIVE 03/04/2018    GLUCOSEU NEGATIVE 03/04/2018    KETUA NEGATIVE 03/04/2018    AMORPHOUS NOT REPORTED 03/04/2018       2D echo   FINDINGS  Left Atrium  Left atrium is normal in size. Aneurysmal interatrial septum. Inter-atrial septum appears so be intact. Left Ventricle  Normal left ventricular ejection fraction (>55%). Right Atrium  Right atrium is normal in size.   Right

## 2018-03-06 NOTE — PROGRESS NOTES
_  _       Progress Note               Date:                           3/6/2018  Patient name:           Ashley Wallace  Date of admission:  2/22/2018 10:30 AM  MRN:   [id number]  YOB: 1955  PCP:                           Garrick Woodard CNP        Subjective:   Pt seen and examined  abd pain  Feels tired  NO Fever chills  No IV avess    HPI in Brief:   The patient is a 57 y.o.  female who is admitted to the hospital for generalized weakness. significant past medical history of pelvic mass concerning for ovarian cancer and asciitic fluid positive for non small cell carcinoma presented with abdominal pain, fatigue and ongoing diarrhea. She has chronic acquired lymphedema as well. Patient had been discharged from [facility] on 2/17/18 after diagnosis of Acute DVT. She is now on Eliquis for DVT. Patient states that since discharge from hospital she has had difficulty taking care of herself. She also complains of increasing fatigue and weakness. She states she is barely able to get off the couch to go to the restroom or to make food. She was found to have lactic acidosis and she was hypotensive and tachycardic and JOCELINE.     Patient had been admitted to [facility] on 1/24/18. At that time CT abdomen/pelvis was performed and demonstrated a pelvic mass measuring 13 cm likely ovarian in origin, bilateral pelvic lymphadenopathy, and large volume abdominal ascites concerning for malignancy. Chest xray was unremarkable.  TVUS was performed and demonstrated a large heterogenous mass-like area measuring up to 10.8 cm, an enlarged uterus measuring 12 cm, and thickened endometrium (16 mm) however the study was significantly limited secondary to patients body habitus. Patient underwent a IR guided paracentesis during which 6.2 L of fluid was removed. CA-125 was collected and found to be elevated at 151.  Ascitic fluid was positive for atypical cells compatible with nonsmall cell carcinoma suspicious Intravenous 2 times per day    sodium chloride flush  10 mL Intravenous 2 times per day    vancomycin  125 mg Oral Q6H     PRN Medications: ondansetron, traMADol, glucose, dextrose, glucagon (rDNA), dextrose, calcium carbonate, sodium chloride flush, acetaminophen, sodium chloride flush  Continuous Infusions:   dextrose         Objective:   Vitals: BP 95/65   Pulse 107   Temp 97.4 °F (36.3 °C) (Oral)   Resp 18   Ht 5' 3\" (1.6 m)   Wt (!) 388 lb 8 oz (176.2 kg)   SpO2 98%   BMI 68.82 kg/m²     General appearance - ill appearing, not in pain or distress, not jaundiced   Mental status - alert and cooperative   Eyes - pupils equal and reactive, extraocular eye movements intact   Mouth - mucous membranes moist, pharynx normal without lesions,   Neck - supple, no palpable  adenopathy , trach midline   Lymphatics - no palpable lymphadenopathy, no hepatosplenomegaly   Chest - clear to auscultation, no wheezes, rales or rhonchi, symmetric air entry , normal chest expansion  Heart - normal rate, regular rhythm, normal S1, S2, no murmurs,  Abdomen - soft, nontender, distended, fluid thrill+, no masses or organomegaly   Neurological - alert, oriented, normal speech, no focal findings or movement disorder noted   Musculoskeletal - no joint tenderness, deformity or swelling   Extremities - peripheral pulses normal, b/l LE lymphedema+, no clubbing or cyanosis   Skin - normal coloration and turgor, no rashes, no suspicious skin lesions noted ,   Port: site of port not red, no tenderness    Data:  No intake/output data recorded. In: 1381.1 [P.O.:900;  I.V.:481.1]  Out: 200 [Urine:200]  CBC:   Recent Labs      03/04/18   0541  03/05/18   0609  03/06/18   0715   WBC  16.4*  17.4*  21.7*   HGB  10.4*  11.0*  10.3*   PLT  261  311  377     BMP:    Recent Labs      03/04/18   0541  03/05/18   0609  03/06/18   0715   NA  130*  130*  131*   K  4.7  4.9  5.4*   CL  96*  95*  95*   CO2  21  21  16*   BUN  52*  59*  66*   CREATININE

## 2018-03-06 NOTE — PLAN OF CARE
Problem: Falls - Risk of  Goal: Absence of falls  Outcome: Ongoing  Skin assessment complete. No new breakdown noted. Interventions in place. See doc flowsheet for interventions initiated. Pt encouraged and assisted to turn. Will continue to monitor for additional needs and skin breakdown.

## 2018-03-06 NOTE — PROGRESS NOTES
Pharmacy Medication Counseling Note    Apixaban counseling provided to [facility]. Handouts provided to patient include: apixaban patient information    Counseling points included:  1. Indication for apixaban: DVT history. 2. Explanation of apixaban (blood thinner)  3. Dose and directions, including missed doses and timing of medication  4. Side effects: bleeding/bruising  5. Potential drug interactions   A. Cytochrome P450 3A4 inhibitors or inducers, Lucio's Wort, grapefuit juice  6. Signs and symptoms of VTE and stroke reviewed  7. Contact prescriber when:   A. Changes made to other medications   B. Signs or symptoms of VTE or stroke   C. Uncontrolled bleeding or unusual bruising    Answered all medication-related questions and patient verbalized understanding.   Malka Harrell [facility]  3/6/2018  3:55 PM

## 2018-03-06 NOTE — PROGRESS NOTES
[facility]  Internal Medicine Residency Program  Inpatient Daily Progress Note  ______________________________________________________________________________    Patient: Chantal Guardado  YOB: 1955   MRN: [id number]    Acct: [de-identified]     Admit date: 2/22/2018  Today's date: 03/06/18  Number of days in the hospital: 12  Expected Discharge Date: 02/26/18    Admitting Diagnosis: JOCELINE (acute kidney injury) ([address]    Subjective:   Pt seen and Chart reviewed. No acute overnight events. Patient remains hemodynamically stable. Creatinine trending up, 2.33 today. Patient reports she is feeling worse today compared to yesterday; complaints of nausea no vomiting. Urine output about 800 mg over the past 24 hours. IV fluids have been stopped  Urine is positive for Candida and patient was started on fluconazole. Remains on vancomycin and meropenem. Blood pressure remains in systolic 810. patient is on Midodrin 5 mg 3 times a day.     Objective:   Vital Sign:  /88   Pulse 124   Temp 99.6 °F (37.6 °C) (Oral)   Resp 18   Ht 5' 3\" (1.6 m)   Wt (!) 388 lb 8 oz (176.2 kg)   SpO2 97%   BMI 68.82 kg/m²       Physical Exam:  General appearance:   alert, well appearing, and in no distress  Mental Status: alert, oriented to person, place, and time  Neurologic:  alert, oriented, normal speech, no focal findings or movement disorder noted  Lungs:  clear to auscultation, no wheezes, rales or rhonchi, symmetric air entry  Heart[de-identified] normal rate and regular rhythm, S1 and S2 normal  Abdomen:  soft, diffuse tenderness noted, no masses or organomegaly  Extremities: bilateral lower extremity edema +3   Skin: normal coloration and turgor, no rashes, no suspicious skin lesions noted    Medications:  Scheduled Medications   fluconazole  200 mg Oral Daily    Followed by   Dorna [place] ON 3/7/2018] fluconazole  100 mg Oral Daily    meropenem  1 g Intravenous Q8H    apixaban  5 mg

## 2018-03-07 NOTE — BRIEF OP NOTE
Brief Postoperative Note    Ever Nicely  YOB: 1955  [id number]    Pre-operative Diagnosis: bilateral hydro/pelvic mass/progressive hydro with stents in place    Post-operative Diagnosis: Same    Procedure: cysto, bilateral stent exchange to 8Fr 26cm JJ stents    Anesthesia: MAC    Surgeons/Assistants: aidan/sofi    Estimated Blood Loss: less than 50     Complications: None    Specimens: Was Not Obtained    Findings: see op note    Electronically signed by Oksana Murphy MD on 3/7/2018 at 2:46 PM

## 2018-03-07 NOTE — ANESTHESIA PRE PROCEDURE
50,000 Units at 03/03/18 3173    sodium chloride flush 0.9 % injection 10 mL  10 mL Intravenous 2 times per day Stephenie Haas MD   10 mL at 03/07/18 0514    sodium chloride flush 0.9 % injection 10 mL  10 mL Intravenous PRN Stephenie Haas MD        acetaminophen (TYLENOL) tablet 650 mg  650 mg Oral Q4H PRN Stephenie Haas MD   650 mg at 03/06/18 0056    sodium chloride flush 0.9 % injection 10 mL  10 mL Intravenous 2 times per day John Mccray MD   10 mL at 03/07/18 0810    sodium chloride flush 0.9 % injection 10 mL  10 mL Intravenous PRN John Feliz MD        vancomycin (VANCOCIN) oral solution 125 mg  125 mg Oral Q6H John Mccray MD   125 mg at 03/07/18 0559       No Known Allergies  Patient Active Problem List   Diagnosis    Cellulitis of right lower extremity    Morbid obesity ([address]    Suspected deep tissue injury    Alteration in skin integrity due to moisture    Fungal disease    Hypothyroidism    JOCELINE (acute kidney injury) ([address]    Other ascites    Chronic acquired lymphedema    Dyspnea and respiratory abnormalities    Acute deep vein thrombosis (DVT) of proximal vein of lower extremity (HCC)    Adnexal mass    Malignant neoplasm of ovary ([address]    JOCELINE (acute kidney injury) ([address]    C. difficile diarrhea    Clostridium difficile diarrhea    Vitamin D deficiency    Hydronephrosis, bilateral    Hyperkalemia     Past Medical History:   Diagnosis Date    Ascites     CAD (coronary artery disease)     Cellulitis     Headache     Lymphedema     Ovarian cancer ([address]     Thyroid disease      Past Surgical History:   Procedure Laterality Date    CYSTOSCOPY  02/26/2018    with right retrograde pyelogram, bilat stent insertion    PARACENTESIS      OR CYSTOSCOPY,INSERT URETERAL STENT N/A 2/26/2018    CYSTOSCOPY, RIGHT RETROGRADE PYELOGRAM,  BILATERAL URETERAL STENT INSERTION performed by Capo Otoole MD at [facility] 1822 History Substance Use Topics    Smoking status: Never Smoker    Smokeless tobacco: Never Used    Alcohol use No         Vital Signs (Current)   Vitals:    18 0900   BP: 105/63   Pulse: 96   Resp: 14   Temp: 97.5 °F (36.4 °C)   SpO2: 97%     Vital Signs Statistics (for past 48 hrs)     Temp  Av.9 °F (36.6 °C)  Min: 97.3 °F (36.3 °C)   Min taken time: 18 1601  Max: 99.6 °F (37.6 °C)   Max taken time: 18 0326  Pulse  Av.4  Min: 96   Min taken time: 18 0900  Max: 124   Max taken time: 18 0326  Resp  Av.6  Min: 14   Min taken time: 18 0900  Max: 21   Max taken time: 18 2047  BP  Min: 95/65   Min taken time: 18 1522  Max: 127/78   Max taken time: 18 2047  SpO2  Av.1 %  Min: 97 %   Min taken time: 18 0900  Max: 100 %   Max taken time: 18 0320  BP Readings from Last 3 Encounters:   18 105/63   18 (!) 94/58   18 (!) 119/57       BMI  Body mass index is 68.47 kg/m².     CBC   Lab Results   Component Value Date    WBC 23.3 2018    RBC 2.93 2018    HGB 8.7 2018    HCT 26.6 2018    MCV 90.8 2018    RDW 14.1 2018     2018       CMP    Lab Results   Component Value Date     2018    K 5.6 2018    CL 91 2018    CO2 19 2018    BUN 69 2018    CREATININE 2.89 2018    GFRAA 20 2018    LABGLOM 16 2018    GLUCOSE 93 2018    PROT 5.1 2018    CALCIUM 8.9 2018    BILITOT <0.10 2018    ALKPHOS 39 2018    AST 25 2018    ALT 6 2018       BMP    Lab Results   Component Value Date     2018    K 5.6 2018    CL 91 2018    CO2 19 2018    BUN 69 2018    CREATININE 2.89 2018    CALCIUM 8.9 2018    GFRAA 20 2018    LABGLOM 16 2018    GLUCOSE 93 2018       POC Testing  Recent Labs      18   1258   POCGLU  128*       Coags    Lab Results kg/m².    CBC:   Lab Results   Component Value Date    WBC 23.3 03/07/2018    RBC 2.93 03/07/2018    HGB 8.7 03/07/2018    HCT 26.6 03/07/2018    MCV 90.8 03/07/2018    RDW 14.1 03/07/2018     03/07/2018       CMP:   Lab Results   Component Value Date     03/07/2018    K 5.6 03/07/2018    CL 91 03/07/2018    CO2 19 03/07/2018    BUN 69 03/07/2018    CREATININE 2.89 03/07/2018    GFRAA 20 03/07/2018    LABGLOM 16 03/07/2018    GLUCOSE 93 03/07/2018    PROT 5.1 02/28/2018    CALCIUM 8.9 03/07/2018    BILITOT <0.10 02/28/2018    ALKPHOS 39 02/28/2018    AST 25 02/28/2018    ALT 6 02/28/2018       POC Tests:   Recent Labs      03/06/18   1258   POCGLU  128*       Coags:   Lab Results   Component Value Date    PROTIME 9.7 02/25/2018    INR 0.9 02/25/2018    APTT 84.5 03/05/2018       HCG (If Applicable): No results found for: PREGTESTUR, PREGSERUM, HCG, HCGQUANT     ABGs: No results found for: PHART, PO2ART, PJS5FAV, POH8JTQ, BEART, U4ABJFXK     Type & Screen (If Applicable):  No results found for: LABABO, LABRH    Anesthesia Evaluation   no history of anesthetic complications:   Airway: Mallampati: III     Neck ROM: full   Dental:          Pulmonary:   (+) shortness of breath:      (-) recent URI and not a current smoker                          ROS comment: DVT MO   Cardiovascular:                   ROS comment: -cp,syn,pnd     Neuro/Psych:   (+) headaches: migraine headaches,    (-) seizures           GI/Hepatic/Renal:        (-) GERD      ROS comment: Ovarian CA with ascities obstructive uropathy  C diff. Endo/Other:        (-) hypothyroidism               Abdominal:           Vascular:                                      Anesthesia Plan      MAC     ASA 4     (ASA 4 ascires)  Induction: intravenous.                           David Sandoval MD   3/7/2018

## 2018-03-07 NOTE — PROGRESS NOTES
Renal Progress Note    Patient :  Katharina Ingram; 57 y.o. MRN# [id number]  Location:  8182/8007-54  Attending:  Benito Breen MD  Admit Date:  2018   Hospital Day: 15    Subjective   Patient seen and examined and chart reviewed. Patient is 4 bilateral ureteric stents today. Creatinine continued to worsen. There has been drop in her urine output total urine output in the last 4 or 5 days is around 7-800 mL per day. Objective     VS: /63   Pulse 96   Temp 97.5 °F (36.4 °C)   Resp 14   Ht 5' 3\" (1.6 m)   Wt (!) 386 lb 8 oz (175.3 kg)   SpO2 97%   BMI 68.47 kg/m²   MAXIMUM TEMPERATURE OVER 24HRS:  Temp (24hrs), Av.5 °F (36.4 °C), Min:97.4 °F (36.3 °C), Max:97.5 °F (36.4 °C)    24HR BLOOD PRESSURE RANGE:  Systolic (49FGP), BFN:647 , Min:95 , JKV:066   ; Diastolic (50EFV), IHC:42, Min:63, Max:76    24HR INTAKE/OUTPUT:      Intake/Output Summary (Last 24 hours) at 18 1149  Last data filed at 18 0602   Gross per 24 hour   Intake           449.81 ml   Output              475 ml   Net           -25.19 ml     WEIGHT :  Patient Vitals for the past 96 hrs (Last 3 readings):   Weight   18 0600 (!) 386 lb 8 oz (175.3 kg)   18 0630 (!) 388 lb 8 oz (176.2 kg)   18 0530 (!) 389 lb 4.8 oz (176.6 kg)       Current Medications:     Scheduled Meds:    fluconazole  100 mg Oral Daily    meropenem  1 g Intravenous Q8H    apixaban  5 mg Oral BID    midodrine  5 mg Oral TID WC    levothyroxine  50 mcg Oral Daily    vitamin D  50,000 Units Oral Weekly    sodium chloride flush  10 mL Intravenous 2 times per day    sodium chloride flush  10 mL Intravenous 2 times per day    vancomycin  125 mg Oral Q6H     Continuous Infusions:    dextrose         Physical Examination:     General:  AAO x 3, speaking in full sentences, no accessory muscle use. Chest:   Bilateral vesicular breath sounds, no rales or wheezes.   Cardiac:  S1 S2 RR, no murmurs, gallops or rubs, JVP not

## 2018-03-07 NOTE — PROGRESS NOTES
vancomycin  125 mg Oral Q6H       PRN Medications    ondansetron 4 mg Q6H PRN   traMADol 50 mg Q4H PRN   glucose 15 g PRN   dextrose 12.5 g PRN   glucagon (rDNA) 1 mg PRN   dextrose 100 mL/hr PRN   calcium carbonate 500 mg BID PRN   sodium chloride flush 10 mL PRN   acetaminophen 650 mg Q4H PRN   sodium chloride flush 10 mL PRN       Diagnostic Labs and Imaging:  CBC:  Recent Labs      03/05/18   0609  03/06/18   0715  03/07/18   1105   WBC  17.4*  21.7*  23.3*   HGB  11.0*  10.3*  8.7*   PLT  311  377  361     BMP:   Recent Labs      03/05/18   0609  03/06/18   0715   NA  130*  131*   K  4.9  5.4*   CL  95*  95*   CO2  21  16*   BUN  59*  66*   CREATININE  2.20*  2.33*   GLUCOSE  110*  121*     Hepatic:   No results for input(s): AST, ALT, ALB, BILITOT, ALKPHOS in the last 72 hours. Assessment and Plan:   Patient admitted with a JOCELINE secondary to dehydration. Recent diagnosis of pelvic mass measuring 13 cm likely ovarian in origin with bilateral pelvic lymphadenopathy and bilateral mild hydronephrosis seen on renal ultrasound. Patient had bilateral ureteral stent placement. Renal ultrasound on 3/1/2018: Bilateral hydronephrosis, increased and now moderate on the right, and  unchanged and still mild on the left.  Small amount of ascites s/p recent  paracentesis.  Known pelvic mass. Hypotension- resolved  Patient has history of hypothyroidism and is on 25 mg of Synthroid at home  Synthroid 50 mg daily   cortisol level normal  Continue ProAmatine 5 mg 3 times a day  Repeat TSH in 1 week  2-D echo: left ejection fraction above 55%    Ovarian cancer and carcinomas. Malignant ascites  Oncology on board and plan for outpatient chemotherapy    Severe C. Diff colitis. - diarrhea has resolved  Continue vancomycin 125 mg every 6 hours for 14 days    JOCELINE- multifactorial secondary to dehydration and Lasix use, obstruction of the ureters.   Baseline was normal 5/23/17  Status post bilateral ureteral stent placement on 2/27/2018  Urology on board, and the Smith catheter placement for now. IR unable to place percutaneous nephrostomy tube due to body habitus. Urology  changing ureteral stents for larger size today  Nephrology following, patient started on allopurinol. Follow-up uric acid    UTI  Candida  Continue fluconazole      Geno Sorensen MD     Department of Internal Medicine  [address] [place] [address]         3/7/2018, 11:58 AM    Attending Physician Statement  I have discussed the care of Woody Anglin, including pertinent history and exam findings with the resident. I have reviewed the key elements of all parts of the encounter with the resident. I have seen and examined the patient with the resident and the key elements of all parts of the encounter have been performed by me. Principal Problem:    JOCELINE (acute kidney injury) ([address]  Active Problems:     Morbid obesity ([address]    Hypothyroidism    Chronic acquired lymphedema    Acute deep vein thrombosis (DVT) of proximal vein of lower extremity (HCC)    Adnexal mass    Malignant neoplasm of ovary (HCC)    C. difficile diarrhea    Vitamin D deficiency    Hydronephrosis, bilateral    Hyperkalemia  Resolved Problems:    Dehydration with hyponatremia    Assessment/Plan  received albumin and Lasix  Decrease urine out put   Stent change today      Jessica Pa MD  Attending and [facility]  Internal Medicine [address] [facility]   3/7/18

## 2018-03-08 NOTE — CARE COORDINATION
Patient underwent stent exchange today. Patient has not participated in PT and OT. Palliative care following. Writer spoke with brother Joyn Hahn who is aware that the patient is not progressing and is concerned that his sister may not get better. Follow for possible long term placement with palliative care as patient is not actively participating in therapies. [facility] following.

## 2018-03-08 NOTE — CONSULTS
Skin: Skin is warm and dry. She is not diaphoretic. Psychiatric: Affect normal.         Medical Decision Making:   I have independently reviewed/ordered the following labs:    CBC with Differential: Recent Labs      03/06/18   0715  03/07/18   1105   WBC  21.7*  23.3*   HGB  10.3*  8.7*   HCT  32.9*  26.6*   PLT  377  361   LYMPHOPCT   --   11*   MONOPCT   --   4     BMP: Recent Labs      03/06/18   0715  03/07/18   1105   NA  131*  130*   K  5.4*  5.6*   CL  95*  91*   CO2  16*  19*   BUN  66*  69*   CREATININE  2.33*  2.89*     Hepatic Function Panel: No results for input(s): PROT, LABALBU, BILIDIR, IBILI, BILITOT, ALKPHOS, ALT, AST in the last 72 hours. No results for input(s): RPR in the last 72 hours. No results for input(s): HIV in the last 72 hours. No results for input(s): BC in the last 72 hours. Lab Results   Component Value Date    MUCUS NOT REPORTED 03/04/2018    RBC 2.93 03/07/2018    TRICHOMONAS NOT REPORTED 03/04/2018    WBC 23.3 03/07/2018    YEAST NOT REPORTED 03/04/2018    TURBIDITY TURBID 03/04/2018     Lab Results   Component Value Date    CREATININE 2.89 03/07/2018    GLUCOSE 93 03/07/2018       Detailed results:  Ascites fluid 1/24/18  RARE ATYPICAL CELLS COMPATIBLE WITH ORIGIN IN NONSMALL CELL   CARCINOMA     SCATTERED MIXED LEUCOCYTES, HISTIOCYTE LIKE CELLULAR ELEMENTS AND   MESOTHELIAL CELLS    2/14/18 CT chest  No evidence of pulmonary embolism or acute pulmonary abnormality. 2. Large volume ascites is again demonstrated. 1/23/18 CT AP  Large 13 cm pelvic mass which is probably an abnormal ovary. The presence of bilateral lymphadenopathy and large volume ascites concerning for malignancy. The patient may benefit from diagnostic and therapeutic paracentesis. Further workup is advised. Initial evaluation with pelvic sonogram may be obtained. Thank you for allowing us to participate in the care of this patient. Please call with questions.     Martene Sacks, MD  Office:

## 2018-03-08 NOTE — PROGRESS NOTES
Physical Therapy    DATE: 3/8/2018    NAME: Thurmon Cranker  MRN: [id number]   : 1955    Patient not seen this date for Physical Therapy due to:  [] Blood transfusion in progress  [] Hemodialysis  []  Patient Declined  [] Spine Precautions   [] Strict Bedrest  [] Surgery/ Procedure  [] Testing      [x] Other: Per RN, patient is having a bad morning but is okay to be seen if agreeable. Patient politely declined. Stated she was very dizzy and did not want to end up on the floor. [] PT being discontinued at this time. Patient independent. No further needs. [] PT being discontinued at this time as the patient has been transferred to palliative care. No further needs.     Jerrica Frank, PTA

## 2018-03-08 NOTE — PROGRESS NOTES
wheezes. Cardiac:  S1 S2 RR, no murmurs, gallops or rubs, JVP not raised. Abdomen: Soft, obese, tender, non distended, BS audible. + fluid wave  SKIN:  No rashes, good skin turgor. Extremities:  +++ pitting edema, no clubbing, No cyanosis  Neuro:  AAO x 3, No FND. Labs:       Recent Labs      03/06/18   0715  03/07/18   1105  03/08/18   1055   WBC  21.7*  23.3*  26.7*   RBC  3.55*  2.93*  2.95*   HGB  10.3*  8.7*  8.7*   HCT  32.9*  26.6*  26.1*   MCV  92.7  90.8  88.5   MCH  29.0  29.7  29.5   MCHC  31.3  32.7  33.3   RDW  14.5*  14.1  14.3   PLT  377  361  383   MPV  9.5  10.0  9.7      BMP:   Recent Labs      03/06/18   0715  03/07/18   1105   NA  131*  130*   K  5.4*  5.6*   CL  95*  91*   CO2  16*  19*   BUN  66*  69*   CREATININE  2.33*  2.89*   GLUCOSE  121*  93   CALCIUM  8.4*  8.9      Phosphorus:   No results for input(s): PHOS in the last 72 hours. Magnesium:  No results for input(s): MG in the last 72 hours. Albumin:    No results for input(s): LABALBU in the last 72 hours. BNP:    No results found for: BNP  PTH:     Lab Results   Component Value Date    IPTH 182.9 02/23/2018     Blood cultures:  No results found for: [facility]    Urinalysis/Chemistries:      Lab Results   Component Value Date    NITRU NEGATIVE 03/04/2018    COLORU YELLOW 03/04/2018    PHUR 5.0 03/04/2018    WBCUA TOO NUMEROUS TO COUNT 03/04/2018    RBCUA TOO NUMEROUS TO COUNT 03/04/2018    MUCUS NOT REPORTED 03/04/2018    TRICHOMONAS NOT REPORTED 03/04/2018    YEAST NOT REPORTED 03/04/2018    BACTERIA NOT REPORTED 03/04/2018    SPECGRAV 1.017 03/04/2018    LEUKOCYTESUR LARGE 03/04/2018    UROBILINOGEN Normal 03/04/2018    BILIRUBINUR NEGATIVE 03/04/2018    GLUCOSEU NEGATIVE 03/04/2018    KETUA NEGATIVE 03/04/2018    AMORPHOUS NOT REPORTED 03/04/2018       2D echo   FINDINGS  Left Atrium  Left atrium is normal in size. Aneurysmal interatrial septum. Inter-atrial septum appears so be intact.   Left Ventricle  Normal left ventricular

## 2018-03-08 NOTE — PROGRESS NOTES
Oral Daily    vitamin D  50,000 Units Oral Weekly    sodium chloride flush  10 mL Intravenous 2 times per day    sodium chloride flush  10 mL Intravenous 2 times per day    vancomycin  125 mg Oral Q6H       PRN Medications    glucose 15 g PRN   dextrose 12.5 g PRN   glucagon (rDNA) 1 mg PRN   dextrose 100 mL/hr PRN   ondansetron 4 mg Q6H PRN   traMADol 50 mg Q4H PRN   glucose 15 g PRN   dextrose 12.5 g PRN   glucagon (rDNA) 1 mg PRN   dextrose 100 mL/hr PRN   calcium carbonate 500 mg BID PRN   sodium chloride flush 10 mL PRN   acetaminophen 650 mg Q4H PRN   sodium chloride flush 10 mL PRN       Diagnostic Labs and Imaging:  CBC:  Recent Labs      03/06/18   0715  03/07/18   1105   WBC  21.7*  23.3*   HGB  10.3*  8.7*   PLT  377  361     BMP:   Recent Labs      03/06/18   0715  03/07/18   1105   NA  131*  130*   K  5.4*  5.6*   CL  95*  91*   CO2  16*  19*   BUN  66*  69*   CREATININE  2.33*  2.89*   GLUCOSE  121*  93     Hepatic:   No results for input(s): AST, ALT, ALB, BILITOT, ALKPHOS in the last 72 hours. Assessment and Plan:   Patient admitted with a JOCELINE secondary to dehydration. Recent diagnosis of pelvic mass measuring 13 cm likely ovarian in origin with bilateral pelvic lymphadenopathy and bilateral mild hydronephrosis seen on renal ultrasound. Patient had bilateral ureteral stent placement. Renal ultrasound on 3/1/2018: Bilateral hydronephrosis, increased and now moderate on the right, and  unchanged and still mild on the left.  Small amount of ascites s/p recent  paracentesis.  Known pelvic mass. Hypotension-   Patient has history of hypothyroidism and is on 25 mg of Synthroid at home  Synthroid 50 mg daily   cortisol level normal  Continue ProAmatine 5 mg 3 times a day  2-D echo: left ejection fraction above 55%    Ovarian cancer and carcinomas. Malignant ascites  Oncology on board and plan for outpatient chemotherapy    Severe C. Diff colitis. - diarrhea has resolved  Continue

## 2018-03-08 NOTE — PROGRESS NOTES
Chart reviewed and update received from Unit RN Manager and . Nephrology saw patient today and was planning to place dialysis catheter which patient refused. Writer to bedside to speak with patient. When asked how she was doing patient responded \"it's not been a good day\". Writer responded that \"I heard you had some setbacks today\"; \"yes we did\". Writer asked patient if her brother and sister could be present so that we could talk through what was going on and patient stated she told them not to come today because she wasn't feeling up to it. \"My brother may stop by, but my sister has her own house to deal with. \" Writer asked if it would be okay if writer stopped back tomorrow to talk more about what was going on and patient responded \"I don't know; we'll see what tomorrow brings\". Writer left as patient was about to have an EKG and expressed she did not feel like talking. Please contact palliative care with any concerns.     Thank you,  Gilma Morgan RN-MSN  Palliative Care Team  Mobile: [phone]  Office: [phone]

## 2018-03-09 NOTE — PROGRESS NOTES
Urology Progress Note    Subjective: Sasha Jarvis is a 57 y.o. female. No acute overnight events. Cr continues to increase and UOP decreased. This Am mei is draining small amount of phyllis urine. Vitals and Labs:  Vitals:    03/08/18 1500 03/08/18 1530 03/08/18 1545 03/08/18 1745   BP: 97/63 101/73  115/86   Pulse: 116 109 111 118   Resp: 18 18 23 17   Temp:    97.6 °F (36.4 °C)   TempSrc:    Oral   SpO2: 98% 98% 99% 99%   Weight:       Height:         I/O last 3 completed shifts: In: 6382 [P.O.:500; I.V.:1042]  Out: 225 [Urine:225]    Recent Labs      03/07/18   1105  03/08/18   1055  03/09/18   0341   WBC  23.3*  26.7*  23.6*   HGB  8.7*  8.7*  8.7*   HCT  26.6*  26.1*  28.1*   MCV  90.8  88.5  95.3   PLT  361  383  407     Recent Labs      03/08/18   1055  03/08/18   1418  03/08/18   2221  03/09/18   0341   NA  129*  129*   --   129*   K  5.9*  6.0*  6.4*  5.9*   CL  93*  90*   --   94*   CO2  17*  16*   --   18*   BUN  75*  76*   --   77*   CREATININE  3.27*  3.35*   --   3.35*       Physical Exam:  NAD  A/O x 3  RRR  No accessory muscles of inspiration  Abdomen soft, appropriately tender, ND  Mei with small amt phyllis urine      Imaging:   None    Impression:    56 yo F w/ B/L HDN, pelvic mass POD#2 s/p cysto stent change     Plan: On Fluconazole for candida UTI. Appears to have fecal contamination of vaginal area, may need empiric antibiotics if her WBC continues to increase and it is suspected to be due to UTI  Continues to have rising cr and decreasing UOP despite upsizing of stent. Discussed with IR yesterday placing bilat PCNTs today. They planned to try to schedule for today in coordination with her paracentesis. Will follow up with them. Hold eliquis, until PCNTs placed. Naye Sitter  6:34 AM 3/9/2018     I have discussed the care of this patient including pertinent history and exam findings, with the resident.  I have seen and examined the patient and the key elements of all parts of the encounter have been performed by me. I agree with the assessment, plan and orders as documented by the resident.   Suri Umana M.D

## 2018-03-09 NOTE — PROGRESS NOTES
[facility]  Internal Medicine Residency Program  Inpatient Daily Progress Note  ______________________________________________________________________________    Patient: Barak Varma  YOB: 1955   MRN: [id number]    Acct: [de-identified]     Admit date: 2/22/2018  Today's date: 03/09/18  Number of days in the hospital: 15  Expected Discharge Date: 02/26/18    Admitting Diagnosis: JOCELINE (acute kidney injury) ([address]    Subjective:   Pt seen and Chart reviewed. Hyperkalemia since yesterday, pt has received total of 3 doses of insulin+Dextrose and lasix yesterday and one dose of florinef and calcium gluconate ordered by nephrology overnight. K this AM 5.9. Minimal urine op. Plan for therapeutic paracentesis today. Eliquis morning dose held. Discussed with pt about the declining kidney function. Pt is agreeable to HD at this point. Will plan for Jaydon Cath placement by IR.      Objective:   Vital Sign:  /71   Pulse 108   Temp 97.4 °F (36.3 °C) (Oral)   Resp 15   Ht 5' 3\" (1.6 m)   Wt (!) 386 lb 8 oz (175.3 kg)   SpO2 98%   BMI 68.47 kg/m²       Physical Exam:  General appearance:   alert, well appearing, and in no distress  Mental Status: alert, oriented to person, place, and time  Neurologic:  alert, oriented, normal speech, no focal findings or movement disorder noted  Lungs:  clear to auscultation, no wheezes, rales or rhonchi, symmetric air entry  Heart[de-identified] normal rate and regular rhythm, S1 and S2 normal  Abdomen:  Tense, diffuse tenderness noted, no masses or organomegaly  Extremities: bilateral lower extremity edema +3   Skin: normal coloration and turgor, no rashes, no suspicious skin lesions noted    Medications:  Scheduled Medications   ciprofloxacin  400 mg Intravenous Once    folic acid  1 mg Oral Daily    albumin human  25 g Intravenous Once    fluconazole  200 mg Oral Daily    allopurinol  100 mg Oral Daily    apixaban  5 mg Oral

## 2018-03-09 NOTE — PROGRESS NOTES
raised. Abdomen: Soft, obese, tender, non distended, BS audible. + fluid wave  SKIN:  No rashes, good skin turgor. Extremities:  +++ pitting edema, no clubbing, No cyanosis  Neuro:  AAO x 3, No FND. Labs:       Recent Labs      03/07/18   1105  03/08/18   1055  03/09/18   0341   WBC  23.3*  26.7*  23.6*   RBC  2.93*  2.95*  2.95*   HGB  8.7*  8.7*  8.7*   HCT  26.6*  26.1*  28.1*   MCV  90.8  88.5  95.3   MCH  29.7  29.5  29.5   MCHC  32.7  33.3  31.0   RDW  14.1  14.3  14.5*   PLT  361  383  407   MPV  10.0  9.7  9.7      BMP:   Recent Labs      03/08/18   1055  03/08/18   1418  03/08/18   2221  03/09/18   0341   NA  129*  129*   --   129*   K  5.9*  6.0*  6.4*  5.9*   CL  93*  90*   --   94*   CO2  17*  16*   --   18*   BUN  75*  76*   --   77*   CREATININE  3.27*  3.35*   --   3.35*   GLUCOSE  97  99   --   90   CALCIUM  8.3*  8.4*   --   9.0    Albumin:    No results for input(s): LABALBU in the last 72 hours. BNP:    No results found for: BNP  PTH:     Lab Results   Component Value Date    IPTH 182.9 02/23/2018     Blood cultures:  No results found for: [facility]    Urinalysis/Chemistries:      Lab Results   Component Value Date    NITRU NEGATIVE 03/04/2018    COLORU YELLOW 03/04/2018    PHUR 5.0 03/04/2018    WBCUA TOO NUMEROUS TO COUNT 03/04/2018    RBCUA TOO NUMEROUS TO COUNT 03/04/2018    MUCUS NOT REPORTED 03/04/2018    TRICHOMONAS NOT REPORTED 03/04/2018    YEAST NOT REPORTED 03/04/2018    BACTERIA NOT REPORTED 03/04/2018    SPECGRAV 1.017 03/04/2018    LEUKOCYTESUR LARGE 03/04/2018    UROBILINOGEN Normal 03/04/2018    BILIRUBINUR NEGATIVE 03/04/2018    GLUCOSEU NEGATIVE 03/04/2018    KETUA NEGATIVE 03/04/2018    AMORPHOUS NOT REPORTED 03/04/2018       2D echo   FINDINGS  Left Atrium  Left atrium is normal in size. Aneurysmal interatrial septum. Inter-atrial septum appears so be intact. Left Ventricle  Normal left ventricular ejection fraction (>55%).   Right Atrium  Right atrium is normal in

## 2018-03-09 NOTE — PROGRESS NOTES
because of the leukocytosis. She has been on by mouth vancomycin since 2/22 until today  Plan is for paracentesis is noticed     Interval changes 03/09/18  No fever, chills, continues to have abdominal pain. She is going to be for bilateral percutaneous nephrostomy tube placement and IR  WBC is down from 26, now 23  Nephrology contemplating hemodialysis, holding off it today to see the result of percutaneous nephrostomy tubes.       Summary of relevant labs:  Labs:  [facility]  Micro:   Blood cultures from 3/6 are negative  Urine culture from 3/4 is positive for Candida albicans heavy growth  C. diff positive on 2/15 2018. Then negative on 2/22  Imaging:      I have personally reviewed the past medical history, past surgical history, medications, social history, and family history, and I have updated the database accordingly.   Past Medical History:     Past Medical History:   Diagnosis Date    Ascites     CAD (coronary artery disease)     Cellulitis     Headache     Lymphedema     Ovarian cancer ([address]     Thyroid disease        Past Surgical  History:     Past Surgical History:   Procedure Laterality Date    CYSTOSCOPY  02/26/2018    with right retrograde pyelogram, bilat stent insertion    PARACENTESIS      ME CYSTOSCOPY,INSERT URETERAL STENT N/A 2/26/2018    CYSTOSCOPY, RIGHT RETROGRADE PYELOGRAM,  BILATERAL URETERAL STENT INSERTION performed by Leigh Sanabria MD at [facility], Bilateral 3/7/2018    CYSTOSCOPY, BILATERAL URETERAL STENT EXCHANGE performed by Víctor Michelle MD at [address] [facility]         Medications:      ciprofloxacin  400 mg Intravenous Once    folic acid  1 mg Oral Daily    albumin human  25 g Intravenous Once    fluconazole  200 mg Oral Daily    allopurinol  100 mg Oral Daily    apixaban  5 mg Oral BID    midodrine  5 mg Oral TID     levothyroxine  50 mcg Oral Daily    vitamin D  50,000 Units Oral Weekly    sodium chloride

## 2018-03-09 NOTE — FLOWSHEET NOTE
DATE: 3/9/2018    NAME: Serjio Kebede  MRN: [id number]   : 1955    Patient not seen this date for Physical Therapy due to:  [] Blood transfusion in progress  [] Hemodialysis  [x]  Patient Declined: Vernal Pacer have a a lot of things set up for me today\", Educated pt on importance of exercise and mob and pt continued to decline  [] Spine Precautions   [] Strict Bedrest  [] Surgery/ Procedure  [] Testing      [] Other        [] PT being discontinued at this time. Patient independent. No further needs. [] PT being discontinued at this time as the patient has been transferred to palliative care. No further needs.     Taj Sales, PTA

## 2018-03-10 NOTE — ANESTHESIA POSTPROCEDURE EVALUATION
Department of Anesthesiology  Postprocedure Note    Patient: Woody Anglin  MRN: [id number]  YOB: 1955  Date of evaluation: 3/10/2018  Time:  3:08 PM     Procedure Summary     Date:  03/10/18 Room / Location:  [facility] Special Procedures    Anesthesia Start:  1230 Anesthesia Stop:  6261    Procedures:       IR NEPHROSTOMY PERCUTANEOUS LEFT      STV IR ANESTHESIA (canceled) Diagnosis:  (Elevated creatinine)    Scheduled Providers:   Responsible Provider:  Byron Licona MD    Anesthesia Type:  general ASA Status:  4          Anesthesia Type: general    Warren Phase I: Warren Score: 10    Warren Phase II:      Last vitals: Reviewed and per EMR flowsheets.        Anesthesia Post Evaluation    Patient location during evaluation: PACU  Patient participation: complete - patient participated  Level of consciousness: awake and alert  Pain score: 0  Nausea & Vomiting: no nausea  Cardiovascular status: hemodynamically stable  Respiratory status: face mask  Hydration status: hypervolemic

## 2018-03-10 NOTE — PROGRESS NOTES
Dr. Hudson Gurrola obtained consent for blood products. Writer signed paperwork as witness. Copy in chart.

## 2018-03-10 NOTE — PROGRESS NOTES
_  _       Progress Note               Date:                           3/10/2018  Patient name:           Sasha Jarvis  Date of admission:  2/22/2018 10:30 AM  MRN:   [id number]  YOB: 1955  PCP:                           January Mccall CNP        Subjective:   Pt seen and examined  Denies new complaint or interval event   HD as per nephrology  Abd pain is controlled   NO Fever chills    HPI in Brief:   The patient is a 57 y.o.  female who is admitted to the hospital for generalized weakness. significant past medical history of pelvic mass concerning for ovarian cancer and asciitic fluid positive for non small cell carcinoma presented with abdominal pain, fatigue and ongoing diarrhea. She has chronic acquired lymphedema as well. Patient had been discharged from [address] [place] [address] on 2/17/18 after diagnosis of Acute DVT. She is now on Eliquis for DVT. Patient states that since discharge from hospital she has had difficulty taking care of herself. She also complains of increasing fatigue and weakness. She states she is barely able to get off the couch to go to the restroom or to make food. She was found to have lactic acidosis and she was hypotensive and tachycardic and JOCELINE.     Patient had been admitted to [address] [place] [address] on 1/24/18. At that time CT abdomen/pelvis was performed and demonstrated a pelvic mass measuring 13 cm likely ovarian in origin, bilateral pelvic lymphadenopathy, and large volume abdominal ascites concerning for malignancy. Chest xray was unremarkable.  TVUS was performed and demonstrated a large heterogenous mass-like area measuring up to 10.8 cm, an enlarged uterus measuring 12 cm, and thickened endometrium (16 mm) however the study was significantly limited secondary to patients body habitus. Patient underwent a IR guided paracentesis during which 6.2 L of fluid was removed. CA-125 was collected and found to be elevated at 151.  Ascitic fluid was positive for atypical cells compatible with nonsmall cell carcinoma suspicious for ovarian origin. Patient was readmitted on 2/14/18 to [facility] secondary to SOB. Patient was found to have an acute DVT in the common femoral, femoral and profunda veins of her left leg and an acute superficial venous thrombosis in the great saphenous vein. CT PE was negative for PE. Patient was seen by Dr. Justin Gasca (Oncology) that admission and patient has plans to follow up with him for further management, appointment currently scheduled for 2/28/18. Patient also had repeat paracentesis and a port placed for chemotherapy on 2/16/18. She also was diagnosed with C. Diff that admission. She states she continues to have loose stools.      Problem Lists:   Primary Problem:  JOCELINE (acute kidney injury) ([facility]   Current Problems:  Active Hospital Problems    Diagnosis Date Noted    Bandemia [D72.825]     Candida UTI [B37.49]     Hydronephrosis, bilateral [N13.30] 02/25/2018    Hyperkalemia [E87.5] 02/25/2018    Vitamin D deficiency [E55.9] 02/24/2018    Acute deep vein thrombosis (DVT) of proximal vein of lower extremity ([facility] [I82.4Y9] 02/22/2018    Adnexal mass [N94.9] 02/22/2018    Malignant neoplasm of ovary (HCC) [C56.9] 02/22/2018    JOCELINE (acute kidney injury) ([facility] [N17.9] 02/22/2018    C. difficile diarrhea [A04.72] 02/22/2018    Chronic acquired lymphedema [I89.0] 01/23/2018    Hypothyroidism [E03.9] 05/24/2017    Morbid obesity ([facility] [E66.01] 04/12/2017     PMH:  Past Medical History:   Diagnosis Date    Ascites     CAD (coronary artery disease)     Cellulitis     Headache     Lymphedema     Ovarian cancer ([facility]     Thyroid disease       Allergies: No Known Allergies     Medications:   Scheduled Meds:   albumin human  25 g Intravenous Once    sodium chloride  250 mL Intravenous Once    folic acid  1 mg Oral Daily    folic acid  1 mg Oral Daily    albumin human  25 g Intravenous Once    fluconazole  200 mg Oral Daily    allopurinol  100

## 2018-03-10 NOTE — PROGRESS NOTES
8. 7*  8.7*  8.7*   PLT  361  383  407     BMP:    Recent Labs      03/08/18   1418   03/09/18   0341  03/09/18   1010  03/09/18   1450   NA  129*   --   129*   --   132*   K  6.0*   < >  5.9*  5.9*  6.5*   CL  90*   --   94*   --   94*   CO2  16*   --   18*   --   19*   BUN  76*   --   77*   --   77*   CREATININE  3.35*   --   3.35*   --   3.78*   GLUCOSE  99   --   90   --   97    < > = values in this interval not displayed. Hepatic:   No results for input(s): AST, ALT, ALB, BILITOT, ALKPHOS in the last 72 hours. INR:   Recent Labs      03/09/18   0341   INR  1.2       IMAGING DATA:  REviewed    Assessment      1. Ovarian cancer and carcinomatosis. bilateral TITA, Malignant ascites. 2. Lower extremity proximal DVT on Eliquis. 3. Morbid obesity. 4. Bilateral lower extremity lymphedema. 5. Severe clostridium difficile colitis. 6.  JOCELINE  7. B/l hydronephrosis from external compression of ureter from adnexal mass s/p b/l ureteral stents placement 2/26/18. 8. Recent C diff colitis  9. Leukocytosis  10. Mild anemia    Plan   HD today  Plan to start outpatient chemotherapy. She will need restaging of ovarian cancer  She unfortunately has been hospitalized many times with large ascites and not able to get to office for chemo  Recommend Gyn Oncology consult  Will follow as needed.        Luis Dawson MD  Hematologist/Medical Oncologist    Cell: [phone]      This note is created with the assistance of a speech recognition program.  While intending to generate a document that actually reflects the content of the visit, the document can still have some errors including those of syntax and sound a like substitutions which may escape proof reading. It such instances, actual meaning can be extrapolated by contextual diversion.

## 2018-03-10 NOTE — PROGRESS NOTES
KETUA NEGATIVE 03/04/2018    AMORPHOUS NOT REPORTED 03/04/2018       2D echo   FINDINGS  Left Atrium  Left atrium is normal in size. Aneurysmal interatrial septum. Inter-atrial septum appears so be intact. Left Ventricle  Normal left ventricular ejection fraction (>55%). Right Atrium  Right atrium is normal in size. Right Ventricle  Normal right ventricular size and function. Mitral Valve  Mitral valve is not well visualized. No evidence of mitral stenosis or mitral regurgitation. Aortic Valve  Aortic valve was not well visualized. Tricuspid Valve  Tricuspid valve was not well visualized. Pericardial Effusion  No significant pericardial effusion is seen. Miscellaneous  IVC normal diameter & inspiratory collapse indicating normal RA filling  pressure . Technically limited study. Radiology:       Assessment:       1. Acute Kidney Injury Secondary to ATN and obstructive uropathy. - worsening  Baseline creat 1.2-1.3. Patient is for bilateral urostomy tube today. 2.  Ascites statufor large-volume paracenteses today  rsistent Hydronephrosis status post stent placement and followed by exchange   4. Malignant neoplasm of ovary  5. Chronic lymphedema with worsening edema  6. Recent DVT - O right: He IS in and and is a and he he does she is going for nephrostomy Q n Elarrival to hold on hemodialysis reason is I'm hoping that nephrostomy tube her renal function. We talked to her now we will hold and we will see how she responds to iquis   7. UTI - U/A + leukocyte esterase and RBCs - Start rocephin  8. Recent  C. Diff on by mouth vancomycin     Plan: 1.the patient got dialysis yesterday. Hyperkalemia resolved. 2.  Patient off Eliquis and heparin. Patient to get nephrostomy tube most likely today. 3.  Perm Cath site intact. 4.  BMP tomorrow a.m. Will reassess for dialysis tomorrow  5. Transfuse as needed to keep hemoglobin above 7  6. Midrin 5 mg 3 times a day.     Nutrition   Renal

## 2018-03-10 NOTE — PROGRESS NOTES
[facility]  Internal Medicine Residency Program  Inpatient Daily Progress Note  ______________________________________________________________________________    Patient: Woody Anglin  YOB: 1955   MRN: [id number]    Acct: [de-identified]     Admit date: 2/22/2018  Today's date: 03/10/18  Number of days in the hospital: 16  Expected Discharge Date: 02/26/18    Admitting Diagnosis: JOCELINE (acute kidney injury) ([address]    Subjective:   Pt seen and Chart reviewed. Patient remains hyperkalemic yesterday. Tunnel cath was placed by IR. Patient had hemodialysis yesterday. today potassium 5.0  Paracentesis yesterday by IR, drain 4.9 L of bloody fluid. Patient had episodes of hypotension during paracentesis and hemodialysis. Patient received albumin ordered per nephrology yesterday. Patient was unable to get bilateral PCNTs yesterday. Plan for bilateral PCNT today. On heparin drip, which is being held for the procedure. This morning hemoglobin 6.8 down from 8.7 yesterday. PRBC transfusion ordered and consent obtained from patient. Abdominal pain has improved.      Objective:   Vital Sign:  BP (!) 101/53   Pulse 106   Temp 97.8 °F (36.6 °C) (Oral)   Resp 16   Ht 5' 3\" (1.6 m)   Wt (!) 384 lb 7.7 oz (174.4 kg)   SpO2 97%   BMI 68.11 kg/m²       Physical Exam:  General appearance:   alert, well appearing, and in no distress  Mental Status: alert, oriented to person, place, and time  Neurologic:  alert, oriented, normal speech, no focal findings or movement disorder noted  Lungs:  clear to auscultation, no wheezes, rales or rhonchi, symmetric air entry  Heart[de-identified] normal rate and regular rhythm, S1 and S2 normal  Abdomen:  Tense, diffuse tenderness noted, no masses or organomegaly  Extremities: bilateral lower extremity edema +3   Skin: normal coloration and turgor, no rashes, no suspicious skin lesions noted    Medications:  Scheduled Medications   albumin

## 2018-03-10 NOTE — PROGRESS NOTES
and a pelvic mass 13 cm associated with bilaterally enlarged lymphadenopathies in the pelvis area. She was also found to have a carcinomatosis of the peritoneum, associated with abdominal pain. This has led to the development of bilateral hydronephrosis with acute renal failure, and the placement of a stent eventually that needs to be changed on 3/7/18 because of lack of resolution of the renal failure, as well as persistent decrease in the urine output. No fever associated with temperature but did increase in the white count progressively declined from 14-21. In the interim, she was found to have a positive urine culture for Candida and was started on Diflucan. On 3/6/18, 100 mg a day. The urine at this time is dark with some blood and sediments. She has an abdominal pain diffusely with distention, and her diarrhea has stopped. She continues to be on the by mouth Vanco.  She just does not feel good and she has a chronic yellow phlegm with some cough that has been there since. She has been admitted to the hospital.  Of note, a CT scan of the chest recently showed no pulmonary emboli or any pulmonary findings. Done on 2/14/18 as below, it showed large ascites. The last CT scan of the abdomen is from January 23, 2018 showing the large 30 cm pelvic mass with bilateral enlarged adenopathies. We are consulted because of the leukocytosis. She has been on by mouth vancomycin since 2/22 until 3-9-18.       CURRENT EVALUATION : 03/10/18     Afebrile  VS stable    Feels better  Had dialysis catheter inserted and received HD on 3-9-18    Paracentesis also done. Fluid not infected. Has completed treatment for C difficile. Will observe clinical evolution. Summary of relevant labs:  Labs:  [facility]  Micro:   Blood cultures from 3/6 are negative  Urine culture from 3/4 is positive for Candida albicans heavy growth  C. diff positive on 2/15 2018.   Then negative on 2/22  Imaging:    Discussed with patient,

## 2018-03-10 NOTE — ANESTHESIA PRE PROCEDURE
times per day John Mccray MD   10 mL at 18 2201    sodium chloride flush 0.9 % injection 10 mL  10 mL Intravenous PRN John Mccray MD           No Known Allergies  Patient Active Problem List   Diagnosis    Cellulitis of right lower extremity    Morbid obesity ([address]    Suspected deep tissue injury    Alteration in skin integrity due to moisture    Fungal disease    Hypothyroidism    JOCELINE (acute kidney injury) ([address]    Other ascites    Chronic acquired lymphedema    Dyspnea and respiratory abnormalities    Acute deep vein thrombosis (DVT) of proximal vein of lower extremity (HCC)    Adnexal mass    Malignant neoplasm of ovary ([address]    JOCELINE (acute kidney injury) ([address]    C. difficile diarrhea    Clostridium difficile diarrhea    Vitamin D deficiency    Hydronephrosis, bilateral    Hyperkalemia    Bandemia    Candida UTI     Past Medical History:   Diagnosis Date    Ascites     CAD (coronary artery disease)     Cellulitis     Headache     Lymphedema     Ovarian cancer ([address]     Thyroid disease      Past Surgical History:   Procedure Laterality Date    CYSTOSCOPY  2018    with right retrograde pyelogram, bilat stent insertion    PARACENTESIS      UT CYSTOSCOPY,INSERT URETERAL STENT N/A 2018    CYSTOSCOPY, RIGHT RETROGRADE PYELOGRAM,  BILATERAL URETERAL STENT INSERTION performed by Kelvin Segovia MD at [address] [facility], Bilateral 3/7/2018    CYSTOSCOPY, BILATERAL URETERAL STENT EXCHANGE performed by Herbert Encarnacion MD at [address] [place] [address] History   Substance Use Topics    Smoking status: Never Smoker    Smokeless tobacco: Never Used    Alcohol use No         Vital Signs (Current)   Vitals:    03/10/18 1113   BP: (!) 93/59   Pulse: 105   Resp: 15   Temp: 97.8 °F (36.6 °C)   SpO2:      Vital Signs Statistics (for past 48 hrs)     Temp  Av.7 °F (36.5 °C)  Min: 97.4 °F (36.3 °C)   Min taken time: 18 MD        0.9 % sodium chloride bolus  150 mL Intravenous PRN Melinda Arizmendi MD        albumin human 25 % solution 25 g  25 g Intravenous PRN Annie Dover MD   Stopped at 03/09/18 1849    midodrine (PROAMATINE) tablet 5 mg  5 mg Oral PRN Annie Dover MD   5 mg at 03/09/18 1851    heparin (porcine) injection 1,900 Units  1,900 Units Intercatheter PRN Annie Dover MD   1,900 Units at 03/09/18 2005    heparin (porcine) injection 1,900 Units  1,900 Units Intercatheter PRN Annie Dover MD   1,900 Units at 03/09/18 2005    fluconazole (DIFLUCAN) tablet 200 mg  200 mg Oral Daily Bharati Wilson MD   200 mg at 03/10/18 0843    glucose (GLUTOSE) 40 % oral gel 15 g  15 g Oral PRN Preeti Stafford MD        glucagon (rDNA) injection 1 mg  1 mg Intramuscular PRN Preeti Stafford MD        dextrose 50 % solution 12.5 g  12.5 g Intravenous PRN Preeti Stafford MD        dextrose 5 % solution  100 mL/hr Intravenous PRN Preeti Stafford MD        0.9 % sodium chloride infusion   Intravenous Continuous Dorsie Severance, MD 20 mL/hr at 03/09/18 0841      glucose (GLUTOSE) 40 % oral gel 15 g  15 g Oral PRN Darcie Angeles MD        dextrose 50 % solution 12.5 g  12.5 g Intravenous PRN Darcie Angeles MD        glucagon (rDNA) injection 1 mg  1 mg Intramuscular PRN Darcie Angeles MD        dextrose 5 % solution  100 mL/hr Intravenous PRN Darcie Angeles MD        allopurinol (ZYLOPRIM) tablet 100 mg  100 mg Oral Daily Melinda Arizmendi MD   100 mg at 03/10/18 0843    ondansetron (ZOFRAN) injection 4 mg  4 mg Intravenous Q6H PRN Stella Moreno MD   4 mg at 03/09/18 0024    traMADol (ULTRAM) tablet 50 mg  50 mg Oral Q4H PRN Stella Moreno MD   50 mg at 03/08/18 1946    apixaban (ELIQUIS) tablet 5 mg  5 mg Oral BID Darcie Angeles MD   5 mg at 03/08/18 0901    midodrine (PROAMATINE) tablet 5 mg  5 mg Oral TID  Dacrie Angeles MD   5 mg at 03/10/18 0843    levothyroxine (SYNTHROID) tablet 50 mcg  50 mcg Oral

## 2018-03-10 NOTE — PROGRESS NOTES
I have discussed the care of this patient including pertinent history and exam findings, with the resident. I have seen and examined the patient and the key elements of all parts of the encounter have been performed by me. I agree with the assessment, plan and orders as documented by the resident. Feng Pederson, [address] [address] [facility]  Urology Progress Note     Subjective: Pt unable to get PCNTs yesterday. She has already had BL ureteral stents exchanged for 8 fr stents. She continues to have BL ureteral obstruction from pelvic mass. She has decreased UOP, and appears to be devenloping renal failure secondary to ureteral obstruction. The patient is now hyperkalemic. She had a permacath placed and is to receive dialysis before anesthesia is able to give general anesthesia for her procedure. Eliquis is being held. On heparin gtt, which is to be stopped for IR procedure to place BL PCNTs today. Pt is eventually planning chemotherapy for pelvic mass.       Patient Vitals for the past 24 hrs:   BP Temp Temp src Pulse Resp SpO2 Weight   03/10/18 0335 (!) 90/50 98.3 °F (36.8 °C) Oral 101 14 97 % -   03/10/18 0253 (!) 82/58 - - - - - -   03/09/18 2325 90/64 97.9 °F (36.6 °C) Oral 97 16 98 % -   03/09/18 2000 (!) 107/58 97.6 °F (36.4 °C) Oral 113 16 - (!) 384 lb 7.7 oz (174.4 kg)   03/09/18 1945 (!) 93/49 - - 113 - - -   03/09/18 1930 107/67 - - 116 - - -   03/09/18 1915 (!) 94/40 - - 114 - - -   03/09/18 1900 (!) 97/58 - - 110 - - -   03/09/18 1845 (!) 88/50 - - 120 - - -   03/09/18 1830 (!) 85/51 - - 96 - - -   03/09/18 1815 (!) 84/46 - - 114 - - -   03/09/18 1800 (!) 87/44 - - 116 - - -   03/09/18 1745 104/61 - - 111 16 - -   03/09/18 1726 (!) 93/58 97.5 °F (36.4 °C) Oral 101 16 100 % (!) 386 lb 3.9 oz (175.2 kg)   03/09/18 1703 108/72 - - 103 (!) 0 100 % -   03/09/18 1657 101/84 - - 106 20 - -   03/09/18 1648 110/75 - - 104 20 100 % -   03/09/18 1643 (!) 108/52 - - 110 20 100 % -   03/09/18

## 2018-03-11 NOTE — PROGRESS NOTES
Concern    Not on file     Social History Narrative    No narrative on file       Family History:     Family History   Problem Relation Age of Onset    Alcohol Abuse Father         Allergies:   Patient has no known allergies. Review of Systems:     Review of Systems   Constitutional: Negative. Negative for activity change, appetite change and chills. HENT: Negative. Negative for congestion. Eyes: Negative. Respiratory: Negative. Negative for cough and chest tightness. Cardiovascular: Negative. Gastrointestinal: Positive for abdominal pain. Endocrine: Negative. Genitourinary: Negative. Musculoskeletal: Negative. Skin: Negative. Allergic/Immunologic: Negative. Neurological: Negative. Hematological: Negative. Psychiatric/Behavioral: Negative. Physical Examination :     Patient Vitals for the past 8 hrs:   BP Temp Temp src Pulse Resp SpO2   03/11/18 1511 127/72 97.7 °F (36.5 °C) Oral 104 16 98 %   03/11/18 1246 109/73 97.5 °F (36.4 °C) Oral 107 20 98 %   03/11/18 0811 98/67 99.1 °F (37.3 °C) Axillary 97 17 98 %       Physical Exam   Constitutional: She is oriented to person, place, and time. No distress. Morbid obesity   HENT:   Head: Normocephalic and atraumatic. Mouth/Throat: No oropharyngeal exudate. Eyes: Conjunctivae and EOM are normal.   Neck: Neck supple. No tracheal deviation present. No thyromegaly present. Cardiovascular: Normal rate and normal heart sounds. No murmur heard. Pulmonary/Chest: Effort normal and breath sounds normal. No respiratory distress. She has no wheezes. Abdominal: Soft. Bowel sounds are normal. There is no tenderness. There is no rebound and no guarding. Musculoskeletal: She exhibits edema. She exhibits no deformity. Severe bilateral lower extremities lymphedema   Neurological: She is alert and oriented to person, place, and time. Skin: Skin is warm and dry. She is not diaphoretic. No erythema.    Psychiatric: Affect

## 2018-03-11 NOTE — PROGRESS NOTES
flush  10 mL Intravenous 2 times per day     Physical Examination:     Genalert and oriented ×3. Chest :   bilateral air entry and clear to auscultation  Cardiac:  S1 S2 RR, no murmurs, gallops or rubs, JVP not raised. Abdomen: Soft, obese, tender, non distended, BS audible. + fluid wave  SKIN:  No rashes, good skin turgor. Extremities:  +++ pitting edema, no clubbing, No cyanosis  Neuro:  AAO x 3, No FND.      Labs:       Recent Labs      03/09/18   0341  03/10/18   0631  03/10/18   1631  03/11/18   0549   WBC  23.6*  18.3*   --   22.2*   RBC  2.95*  2.29*   --   2.79*   HGB  8.7*  6.8*  8.6*  8.3*   HCT  28.1*  21.4*  26.8*  25.7*   MCV  95.3  93.4   --   92.1   MCH  29.5  29.7   --   29.7   MCHC  31.0  31.8   --   32.3   RDW  14.5*  15.3*   --   16.6*   PLT  407  302   --   235   MPV  9.7  9.9   --   9.6      BMP:   Recent Labs      03/10/18   0631  03/10/18   1631  03/11/18   0549   NA  131*  131*  133*   K  5.0  5.3  4.9   CL  94*  94*  96*   CO2  19*  21  22   BUN  63*  65*  46*   CREATININE  3.31*  3.63*  2.72*   GLUCOSE  92  113*  135*   CALCIUM  8.1*  7.7*  7.8*    Albumin:    Recent Labs      03/08/18   1418  03/09/18   1450   LABALBU  4.0  2.5*     BNP:    No results found for: BNP  PTH:     Lab Results   Component Value Date    IPTH 182.9 02/23/2018     Blood cultures:  No results found for: [facility] [place]    Urinalysis/Chemistries:      Lab Results   Component Value Date    NITRU NEGATIVE 03/04/2018    COLORU YELLOW 03/04/2018    PHUR 5.0 03/04/2018    WBCUA TOO NUMEROUS TO COUNT 03/04/2018    RBCUA TOO NUMEROUS TO COUNT 03/04/2018    MUCUS NOT REPORTED 03/04/2018    TRICHOMONAS NOT REPORTED 03/04/2018    YEAST NOT REPORTED 03/04/2018    BACTERIA NOT REPORTED 03/04/2018    SPECGRAV 1.017 03/04/2018    LEUKOCYTESUR LARGE 03/04/2018    UROBILINOGEN Normal 03/04/2018    BILIRUBINUR NEGATIVE 03/04/2018    GLUCOSEU NEGATIVE 03/04/2018    KETUA NEGATIVE 03/04/2018    AMORPHOUS NOT REPORTED 03/04/2018       2D echo FINDINGS  Left Atrium  Left atrium is normal in size. Aneurysmal interatrial septum. Inter-atrial septum appears so be intact. Left Ventricle  Normal left ventricular ejection fraction (>55%). Right Atrium  Right atrium is normal in size. Right Ventricle  Normal right ventricular size and function. Mitral Valve  Mitral valve is not well visualized. No evidence of mitral stenosis or mitral regurgitation. Aortic Valve  Aortic valve was not well visualized. Tricuspid Valve  Tricuspid valve was not well visualized. Pericardial Effusion  No significant pericardial effusion is seen. Miscellaneous  IVC normal diameter & inspiratory collapse indicating normal RA filling  pressure . Technically limited study. Radiology:       Assessment:       1. Acute Kidney Injury Secondary to ATN and obstructive uropathy. - worsening  Baseline creat 1.2-1.3. Patient is for bilateral urostomy tube today. 2.  Ascites statufor large-volume paracenteses today  rsistent Hydronephrosis status post stent placement and followed by exchange   4. Malignant neoplasm of ovary  5. Chronic lymphedema with worsening edema  6. Recent DVT - O right: He IS in and and is a and he he does she is going for nephrostomy Q n Elarrival to hold on hemodialysis reason is I'm hoping that nephrostomy tube her renal function. We talked to her now we will hold and we will see how she responds to iquis   7. UTI - U/A + leukocyte esterase and RBCs - Start rocephin  8. Recent  C. Diff on by mouth vancomycin     Plan: 1. Patient got 1 unit of PRBC transfusion yesterday, patient got dialysis yesterday as well. Patient got bilateral nephrostomy to use place yesterday. Since last night 100 milliliters out from the nephrostomy tubes. 2.  Repeat BMP in a.m. And reassess for dialysis. Hold off dialysis today  3. Perm Cath site intact. 4. Transfuse as needed to keep hemoglobin above 7  5. Midorin 5 mg 3 times a day.     Nutrition   Renal

## 2018-03-11 NOTE — PROGRESS NOTES
on 3/1/2018: Bilateral hydronephrosis, increased and now moderate on the right, and  unchanged and still mild on the left.  Small amount of ascites s/p recent  paracentesis.  Known pelvic mass. Hypotension-   Patient has history of hypothyroidism and is on 25 mg of Synthroid at home  Synthroid 50 mg daily   cortisol level normal  Continue ProAmatine 5 mg 3 times a day  2-D echo: left ejection fraction above 55%    Ovarian cancer and carcinomas. Malignant ascites  Oncology on board and plan for outpatient chemotherapy  Status post therapeutic paracentesis yesterday, drained 4.9 L of bloody fluid. Await cytology results  Oncology on board. Recommending cutting oncology consult for restaging    Severe C. Diff colitis. - diarrhea has resolved  Completed vancomycin 125 mg every 6 hours for 14 days. JOCELINE- multifactorial secondary to dehydration and Lasix use, obstruction of the ureters. Baseline was normal 5/23/17  Status post bilateral ureteral stent placement on 2/27/2018  Urology on board  status post changing ureteral stents for larger size   Percutaneous nephrostomy tube placement by IR yesterday, draining well   ID consult: Reactive leukocytosis possibly related to abdominal carcinomatosis  Hemodialysis per nephrology    Hyperkalemiastable  s/p hemodialysis yesterday    Acute anemia  Status post 1 unit PRBC transfusion yesterday   Hemoglobin stable at 8.3  Will continue to monitor, transfuse if hemoglobin less than 7    UTI  Candida  Continue fluconazole      Francia Goldberg, MD     Department of Internal Medicine  [facility]         3/11/2018, 8:07 AM      Attending Physician Statement  I have discussed the care of Ever Nicely, including pertinent history and exam findings with the resident. I have reviewed the key elements of all parts of the encounter with the resident.  I have seen and examined the patient with the resident and the key elements of all parts of the encounter have been performed by me. Principal Problem:    JOCELINE (acute kidney injury) ([facility] [address]  Active Problems: Morbid obesity ([facility] [address]    Hypothyroidism    Chronic acquired lymphedema    Acute deep vein thrombosis (DVT) of proximal vein of lower extremity (HCC)    Adnexal mass    Malignant neoplasm of ovary (HCC)    C. difficile diarrhea    Vitamin D deficiency    Hydronephrosis, bilateral    Hyperkalemia    Bandemia    Candida UTI  Resolved Problems:    Dehydration with hyponatremia    Assessment/Plan  s/p PNT placement   Continues to have decreased urine output   On HD  On diflucan per ID  s/p pRBC transfusion , hb stable   BP improving with Midodrine 5 tid   Monitor creat   Likely dc to SNF and chemo out patient once nephrology and urology workup completed.      Stefany Nayak MD  Attending and Faculty Internal Medicine  [facility]  Internal Medicine [address] [facility]   3/11/18

## 2018-03-11 NOTE — PROGRESS NOTES
intake/output data recorded. In: 9533 [P.O.:800; I.V.:195]  Out: 2005 [Urine:105]  CBC:   Recent Labs      03/09/18   0341  03/10/18   0631  03/10/18   1631  03/11/18   0549   WBC  23.6*  18.3*   --   22.2*   HGB  8.7*  6.8*  8.6*  8.3*   PLT  407  302   --   235     BMP:    Recent Labs      03/10/18   0631  03/10/18   1631  03/11/18   0549   NA  131*  131*  133*   K  5.0  5.3  4.9   CL  94*  94*  96*   CO2  19*  21  22   BUN  63*  65*  46*   CREATININE  3.31*  3.63*  2.72*   GLUCOSE  92  113*  135*     Hepatic:   No results for input(s): AST, ALT, ALB, BILITOT, ALKPHOS in the last 72 hours. INR:   Recent Labs      03/09/18   0341   INR  1.2       IMAGING DATA:  REviewed    Assessment      1. Ovarian cancer and carcinomatosis. bilateral TITA, Malignant ascites. 2. Lower extremity proximal DVT on Eliquis. 3. Morbid obesity. 4. Bilateral lower extremity lymphedema. 5. Severe clostridium difficile colitis. 6.  JOCELINE  7. B/l hydronephrosis from external compression of ureter from adnexal mass s/p b/l ureteral stents placement 2/26/18. 8. Recent C diff colitis  9. Leukocytosis  10. Mild anemia    Plan   HD per nephrology  Plan to start outpatient chemotherapy. She will need restaging of ovarian cancer  She unfortunately has been hospitalized many times with large ascites and not able to get to office for chemo  Ok to d/c from oncology standpoint  Awaiting transfer to [facility]        This note is created with the assistance of a speech recognition program.  While intending to generate a document that actually reflects the content of the visit, the document can still have some errors including those of syntax and sound a like substitutions which may escape proof reading. It such instances, actual meaning can be extrapolated by contextual diversion.

## 2018-03-11 NOTE — PLAN OF CARE
Problem: Risk for Impaired Skin Integrity  Goal: Tissue integrity - skin and mucous membranes  Structural intactness and normal physiological function of skin and  mucous membranes. Outcome: Ongoing  Patient remains at risk for impaired skin and tissue integrity due to decreased mobility. Patient is encouraged and assisted to turning every two hours throughout this shift. Frequent skin assessments have been performed throughout this shift with no new areas on concern. Problem: Falls - Risk of  Goal: Absence of falls  Outcome: Ongoing  Patient remains free from falls throughout this shift. Patient calls out appropriately. Call light and bedside table are within reach. Bed in lowest position and wheels are locked. Bed alarm on for added safety.

## 2018-03-12 NOTE — PROGRESS NOTES
Writer talked to all services to verify if patient is okay to discharge to SNF from their stand point. Dr. Rock Payne from nephrology okay with discharge. Dr. Ailin Ely with Urology is okay with discharge as long as nephrology is okay with it. Dr. Iban Moore from Infectious Disease is also ready for patient to be discharged.

## 2018-03-12 NOTE — PROGRESS NOTES
complaints from previous session. Family / Caregiver Present: No  Subjective  Subjective: Pt in bed; expressing frustration with recent diagnosis. Pt easily agitated but agreeable to bed exercises at this time. Pain Screening  Patient Currently in Pain: Denies  Vital Signs  Patient Currently in Pain: Denies       Orientation  Orientation  Overall Orientation Status: Within Normal Limits  Objective    Exercises  Hamstring Sets: 10x  Quad Sets: 2x 10  Gluteal Sets: 20x  Hip Abduction: 2x10 with AAROM to prevent sheering forces  Ankle Pumps: 10x  Shoulder Active Range of Motion: 20x each in all planes of motion, occasional rest breaks d/t fatigue, B shoulder ROM WNL       Assessment   Body structures, Functions, Activity limitations: Decreased functional mobility ; Decreased endurance;Decreased strength  Assessment: Pt completed AROM BUEs with good tolerance. Required AAROM for BLE ROM; limited d/t body habitus. Prognosis: Fair  Patient Education: importance of mobility  REQUIRES PT FOLLOW UP: Yes  Activity Tolerance  Activity Tolerance: Patient limited by endurance; Patient limited by fatigue     Goals  Short term goals  Time Frame for Short term goals: 14 visits  Short term goal 1: Pt to ambulate 300ft with RW and CGA  Short term goal 2: Pt to tolerate at least 30mins of UE/LE exercises in order to increase overall strength  Short term goal 3: Pt to perform bed mobility and transfers with CGA  Short term goal 4: Pt to have increased dynamic standing balance to Good minus in order to decrease fall risk    Plan    Plan  Times per week: 5-6x/wk  Current Treatment Recommendations: Strengthening, ROM, Balance Training, Functional Mobility Training, Transfer Training, Gait Training, Endurance Training  Safety Devices  Type of devices:  All fall risk precautions in place, Left in bed, Call light within reach  Restraints  Initially in place: No     Therapy Time   Individual Concurrent Group Co-treatment   Time In 1006 Time Out 1032         Minutes [address] [place], [place]

## 2018-03-12 NOTE — PROGRESS NOTES
fluconazole  200 mg Oral Daily    allopurinol  100 mg Oral Daily    apixaban  5 mg Oral BID    levothyroxine  50 mcg Oral Daily    vitamin D  50,000 Units Oral Weekly    sodium chloride flush  10 mL Intravenous 2 times per day     PRN Medications: fentanNYL, morphine, labetalol, dextrose, glucose, glucose, dextrose, dextrose, [COMPLETED] insulin regular **AND** dextrose, sodium chloride, sodium chloride, albumin human, midodrine, heparin (porcine), heparin (porcine), glucose, glucagon (rDNA), dextrose, dextrose, glucose, dextrose, glucagon (rDNA), dextrose, ondansetron, traMADol, calcium carbonate, acetaminophen, sodium chloride flush  Continuous Infusions:   dextrose      dextrose      sodium chloride 20 mL/hr at 03/11/18 2157    dextrose         Objective:   Vitals: /73   Pulse 89   Temp 97.9 °F (36.6 °C) (Oral)   Resp 14   Ht 5' 3\" (1.6 m)   Wt (!) 383 lb 6.1 oz (173.9 kg)   SpO2 98%   BMI 67.91 kg/m²     General appearance - ill appearing, not in pain or distress, not jaundiced   Mental status - alert and cooperative   Eyes - pupils equal and reactive, extraocular eye movements intact   Mouth - mucous membranes moist, pharynx normal without lesions,   Neck - supple, no palpable  adenopathy , trach midline   Lymphatics - no palpable lymphadenopathy, no hepatosplenomegaly   Chest - clear to auscultation, no wheezes, rales or rhonchi, symmetric air entry , normal chest expansion  Heart - normal rate, regular rhythm, normal S1, S2, no murmurs,  Abdomen - soft, nontender, distended, fluid thrill+, no masses or organomegaly   Neurological - alert, oriented, normal speech, no focal findings or movement disorder noted   Musculoskeletal - no joint tenderness, deformity or swelling   Extremities - peripheral pulses normal, b/l LE lymphedema+, no clubbing or cyanosis   Skin - normal coloration and turgor, no rashes, no suspicious skin lesions noted ,   Port: site of port not red, no

## 2018-03-12 NOTE — FLOWSHEET NOTE
Pre wt was 175kg. Removing 1.5kg today as tolerated. New CVC placed this a.m. [address] Kle[address] Venous port sluggish, but ran well. No issues with tx today.

## 2018-03-12 NOTE — PROGRESS NOTES
glucose 15 g PRN   glucose 15 g PRN   dextrose 12.5 g PRN   dextrose 100 mL/hr PRN   dextrose 25 g PRN   sodium chloride 250 mL PRN   sodium chloride 150 mL PRN   albumin human 25 g PRN   midodrine 5 mg PRN   heparin (porcine) 1,900 Units PRN   heparin (porcine) 1,900 Units PRN   glucose 15 g PRN   glucagon (rDNA) 1 mg PRN   dextrose 12.5 g PRN   dextrose 100 mL/hr PRN   glucose 15 g PRN   dextrose 12.5 g PRN   glucagon (rDNA) 1 mg PRN   dextrose 100 mL/hr PRN   ondansetron 4 mg Q6H PRN   traMADol 50 mg Q4H PRN   calcium carbonate 500 mg BID PRN   acetaminophen 650 mg Q4H PRN   sodium chloride flush 10 mL PRN       Diagnostic Labs and Imaging:  CBC:  Recent Labs      03/10/18   0631  03/10/18   1631  03/11/18   0549  03/12/18   0535   WBC  18.3*   --   22.2*  20.4*   HGB  6.8*  8.6*  8.3*  8.0*   PLT  302   --   235  201     BMP: Recent Labs      03/10/18   1631  03/11/18   0549  03/12/18   0535   NA  131*  133*  131*   K  5.3  4.9  4.7   CL  94*  96*  93*   CO2  21  22  20   BUN  65*  46*  55*   CREATININE  3.63*  2.72*  3.23*   GLUCOSE  113*  135*  119*     Hepatic: No results for input(s): AST, ALT, ALB, BILITOT, ALKPHOS in the last 72 hours. Assessment and Plan:   1. Hypthyroidism. Synthroid 50 mcg. Midodrine 5 mg TID. 2. Ovarian carcinoma with malignant ascities. Outpatient chemo, paracentesis LD 1100, WBC 4319, ,000, Culture showing few neutrophils with no bacteria seen. Heme onc recs appreciated. 3. JOCELINE 2/2 ATN and compressed bladder and ureters, HD. Cr remains elevated. Nephrology recs appreciated as well as urology. Stents will need to be removed in the future as per urology note. Percutaneouys nephrostomy tubes in place draining fluid. 4. Hyperkalemia. Resolved, Nephrology recs appreciated. Patient underwent HD. 5. Acute anemia, secondary to ascites. Heme onc recs appreciated. 6. UTI. Continue candida as per ID    DC planning to SNF.     Luiz Olson MD      Department of Internal

## 2018-03-12 NOTE — PROGRESS NOTES
NEPHROLOGY PROGRESS NOTE      SUBJECTIVE     Scheduled for HD today. BP stable. Had PCN in place along with ureteric stent exchange  Hem Onco review noted    OBJECTIVE     Vitals:    03/11/18 1912 03/12/18 0341 03/12/18 0730 03/12/18 1150   BP: 126/69 126/73 (!) 94/59 111/67   Pulse: 104 89 101 101   Resp: 19 14 17 18   Temp: 98 °F (36.7 °C) 97.9 °F (36.6 °C) 97.7 °F (36.5 °C) 97.9 °F (36.6 °C)   TempSrc: Oral Oral Oral Oral   SpO2: 98% 98% 98% 99%   Weight:       Height:         24HR INTAKE/OUTPUT:    Intake/Output Summary (Last 24 hours) at 03/12/18 1231  Last data filed at 03/12/18 0500   Gross per 24 hour   Intake             2280 ml   Output              195 ml   Net             2085 ml       General appearance:Awake, alert, in no acute distress  HEENT: PERRLA  Respiratory::vesicular breath sounds,no wheeze/crackles  Cardiovascular:S1 S2 normal,no gallop or organic murmur. Abdomen:Non tender/non distended. Bowel sounds present  Extremities: No Cyanosis or Clubbing,present Lower extremity edema  Neurological:Alert and oriented. No abnormalities of mood, affect, memory, mentation, or behavior are noted      MEDICATIONS     Scheduled Meds:    midodrine  5 mg Oral TID WC    albumin human  25 g Intravenous Once    sodium chloride  250 mL Intravenous Once    folic acid  1 mg Oral Daily    albumin human  25 g Intravenous Once    fluconazole  200 mg Oral Daily    allopurinol  100 mg Oral Daily    apixaban  5 mg Oral BID    levothyroxine  50 mcg Oral Daily    vitamin D  50,000 Units Oral Weekly    sodium chloride flush  10 mL Intravenous 2 times per day     Continuous Infusions:    dextrose      dextrose      sodium chloride 20 mL/hr at 03/11/18 2157    dextrose       PRN Meds:  fentanNYL, morphine, labetalol, dextrose, glucose, glucose, dextrose, dextrose, [COMPLETED] insulin regular **AND** dextrose, sodium chloride, sodium chloride, albumin human, midodrine, heparin (porcine), heparin (porcine), glucose, glucagon (rDNA), dextrose, dextrose, glucose, dextrose, glucagon (rDNA), dextrose, ondansetron, traMADol, calcium carbonate, acetaminophen, sodium chloride flush  Home Meds:                Prescriptions Prior to Admission: lactobacillus (CULTURELLE) capsule, Take 1 capsule by mouth 2 times daily  [] metroNIDAZOLE (FLAGYL) 500 MG tablet, Take 1 tablet by mouth every 8 hours for 10 days  apixaban (ELIQUIS) 5 MG TABS tablet, Take 1 tablet by mouth 2 times daily  furosemide (LASIX) 20 MG tablet, TAKE 1 TABLET DAILY  potassium chloride (KLOR-CON M) 20 MEQ extended release tablet, TAKE 1 TABLET DAILY  potassium chloride (KLOR-CON M) 20 MEQ TBCR extended release tablet, take 1 tablet by mouth once daily    INVESTIGATIONS     Last 3 CMP:  Recent Labs      18   1450   03/10/18   1631  18   0549  18   0535   NA  132*   < >  131*  133*  131*   K  6.5*   < >  5.3  4.9  4.7   CL  94*   < >  94*  96*  93*   CO2  19*   < >  21  22  20   BUN  77*   < >  65*  46*  55*   CREATININE  3.78*   < >  3.63*  2.72*  3.23*   CALCIUM  8.3*   < >  7.7*  7.8*  7.7*   LABALBU  2.5*   --    --    --    --     < > = values in this interval not displayed. Last 3 CBC:  Recent Labs      03/10/18   0631  03/10/18   1631  18   0549  18   0535   WBC  18.3*   --   22.2*  20.4*   RBC  2.29*   --   2.79*  2.75*   HGB  6.8*  8.6*  8.3*  8.0*   HCT  21.4*  26.8*  25.7*  25.5*   MCV  93.4   --   92.1  92.7   MCH  29.7   --   29.7  29.1   MCHC  31.8   --   32.3  31.4   RDW  15.3*   --   16.6*  16.6*   PLT  302   --   235  201   MPV  9.9   --   9.6  9.8       ASSESSMENT     1. Acute Kidney Injury Secondary to ATN and obstructive uropathy.- HD dependent   Baseline creat 1.2-1.3. 2.  Ascites status  paracenteses   3. Persistant Hydronephrosis status post stent placement and followed by exchange and BL PCN insitu also  4. Malignant neoplasm of ovary  5. Chronic lymphedema with worsening edema  6.  Recent DVT

## 2018-03-12 NOTE — PROGRESS NOTES
Supplement Intake, Diet Tolerance, Wound Healing, Fluid Balance, Ascites/Edema, Weight, Comparative Standards, Pertinent Labs    See Adult Nutrition Doc Flowsheet for more detail.      Electronically signed by Marcos Epperson RD, LD on 3/12/18 at 2:56 PM    Contact Number: [phone]

## 2018-03-12 NOTE — PROGRESS NOTES
Nancy Cabezas, Dianne Pizarro  Urology Progress Note   BL ureteral obstruction from pelvic mass, s/p BL PCNT placement   Subjective: PCNTs in place and draining well. Smith catheter removed as patient is diverted with PCNTs. On hemodialysis per Nephrology. UCx: Candida  On Diflucan    Patient Vitals for the past 24 hrs:   BP Temp Temp src Pulse Resp SpO2   03/12/18 0341 126/73 97.9 °F (36.6 °C) Oral 89 14 98 %   03/11/18 1912 126/69 98 °F (36.7 °C) Oral 104 19 98 %   03/11/18 1511 127/72 97.7 °F (36.5 °C) Oral 104 16 98 %   03/11/18 1246 109/73 97.5 °F (36.4 °C) Oral 107 20 98 %       Intake/Output Summary (Last 24 hours) at 03/12/18 0814  Last data filed at 03/12/18 0500   Gross per 24 hour   Intake             2730 ml   Output              195 ml   Net             2535 ml       Recent Labs      03/10/18   0631  03/10/18   1631  03/11/18   0549  03/12/18   0535   WBC  18.3*   --   22.2*  20.4*   HGB  6.8*  8.6*  8.3*  8.0*   HCT  21.4*  26.8*  25.7*  25.5*   MCV  93.4   --   92.1  92.7   PLT  302   --   235  201     Recent Labs      03/10/18   1631  03/11/18   0549  03/12/18   0535   NA  131*  133*  131*   K  5.3  4.9  4.7   CL  94*  96*  93*   CO2  21  22  20   BUN  65*  46*  55*   CREATININE  3.63*  2.72*  3.23*       No results for input(s): COLORU, PHUR, LABCAST, WBCUA, RBCUA, MUCUS, TRICHOMONAS, YEAST, BACTERIA, CLARITYU, SPECGRAV, LEUKOCYTESUR, UROBILINOGEN, BILIRUBINUR, BLOODU in the last 72 hours. Invalid input(s): NITRATE, GLUCOSEUKETONESUAMORPHOUS    Additional Lab/culture results:    Physical Exam:   BL pcnts in place, draining clear yellow urine.     Interval Imaging Findings:    Impression:    Patient Active Problem List   Diagnosis    Cellulitis of right lower extremity    Morbid obesity ([address]    Suspected deep tissue injury    Alteration in skin integrity due to moisture    Fungal disease    Hypothyroidism    JOCELINE (acute kidney injury) ([address]    Other ascites    Chronic

## 2018-03-12 NOTE — PROGRESS NOTES
[facility] - Initial Consult Note  Today's Date and Time: 3/12/2018, 11:54 AM  admission date 2/22/2018  Impression :   Current:    · Leukocytosis, on the rise  · Non-small cell peritoneum carcinomatosis  · Large pelvic mass 13 cm, possibly cancer  · Acute renal failure with extrinsic obstruction, bilateral ureters, post stent exchange to larger ones on 3/8/18, procedure described to be difficult  · Fungiuria versus fungal UTI  · C. diff colitis, improving on treatment, since 2/28/18     Other:  · Chronic bilateral lymphedema and morbid obesity  Discussion    · C. diff colitis, improving and stools are solid.-On vancomycin since 2/28/80  · Severe bilateral lower extremity lymphedema and obesity  · Bilateral enlarged pelvic lymph nodes with a 13 cm pelvic mass thought to be cancerous, along with peritoneal carcinomatosis January 2018, suggesting non-small cell carcinoma, source thought to be ovarian. Associated with bilateral ureter extrinsic obstruction. · Patient. Obese, high risk for PCNT  · Obstructive. Bilateral neuropathy related to large left wounds in the pelvic area obstructive extrinsically to the ureters-percutaneous nephrostomy tubes indicated, but difficult due to overweight. Had first stent placed, needs to be enlarged, done on 3/7/18, but creatinine not better yet. Urine output remains low. Urology. The discussing PCNT. · Leukocytosis slowly climbing for the last week after 23,  · Funguria versus fungal urine infection in the presence of obstruction. Has been on Diflucan since 3/6/18  · leukocytosis is the main issue climbing in the absence of fever, possibly related to abdominal carcinomatosis. Recommendations   · Continue Diflucan  200 mg daily for better penetration into the kidney and  tract. till 3/23/18 ie 14 days for presumed pyelonephritis  · 2.   Blood cultures so far neg  · Chest x-ray neg  · disch planning  · Disc w RN, pt and paracentesis is noticed     Interval changes 03/12/18  No fever and bilat PCNT wo pain and abd pain resolved  Post 5 liters of paracentesis, stopped after hypotension   bilat PCNT uneventful  All cx neg  Creat not correcting - planned to for her third HD today  WBC 26-23-20         Summary of relevant labs:  Labs:  [facility] [id number]  Micro:   Blood cultures from 3/6 are negative  Urine culture from 3/4 is positive for Candida albicans heavy growth  C. diff positive on 2/15 2018. Then negative on 2/22  Imaging:      I have personally reviewed the past medical history, past surgical history, medications, social history, and family history, and I have updated the database accordingly.   Past Medical History:     Past Medical History:   Diagnosis Date    Ascites     CAD (coronary artery disease)     Cellulitis     Headache     Lymphedema     Ovarian cancer ([address]     Thyroid disease        Past Surgical  History:     Past Surgical History:   Procedure Laterality Date    CYSTOSCOPY  02/26/2018    with right retrograde pyelogram, bilat stent insertion    PARACENTESIS      RI CYSTOSCOPY,INSERT URETERAL STENT N/A 2/26/2018    CYSTOSCOPY, RIGHT RETROGRADE PYELOGRAM,  BILATERAL URETERAL STENT INSERTION performed by Miller Crawford MD at [address] [facility],8 Bilateral 3/7/2018    CYSTOSCOPY, BILATERAL URETERAL STENT EXCHANGE performed by Georgette Limon MD at [address] [facility]         Medications:      midodrine  5 mg Oral TID WC    albumin human  25 g Intravenous Once    sodium chloride  250 mL Intravenous Once    folic acid  1 mg Oral Daily    albumin human  25 g Intravenous Once    fluconazole  200 mg Oral Daily    allopurinol  100 mg Oral Daily    apixaban  5 mg Oral BID    levothyroxine  50 mcg Oral Daily    vitamin D  50,000 Units Oral Weekly    sodium chloride flush  10 mL Intravenous 2 times per day       Social History:     Social History     Social History    Marital

## 2018-03-13 NOTE — PROGRESS NOTES
Writer received call from [employer] () at [employer]. [employer] stated that she was concerned about transportation of pt from SNF to Dialysis and stated that their facility has had issues with stretcher transports from SNFs in the past.  [employer] stated that [facility] has a lift but that it will not be able to accommodate pt's weight. Writer called [facility] where we are awaiting precert for pt and spoke with Mya Abrams in admissions. Mya Abrams stated that the facility can do stretcher transport, but verification of medical necessity will need to be provided. Writer informed RADHA Parks. Writer called [employer] (admisitrator) at [facility] to provide her with the information above. Left voicemail. Await response. Writer called coordinator at [facility], Johnathan to see if [facility] will be able to accommodate pt, or to try [facility] or [employer]. Johnathan stated that she would look into the process of escalating pt's case and has reached out to [employer]. Await responses.       Jovan Manriquez [facility] [place]

## 2018-03-13 NOTE — PROGRESS NOTES
anytime  · Disc w pt     Infection Control Recommendations   · [facility] Precautions  · Contact Isolation         Antimicrobial Stewardship Recommendations   · Simplification of therapy  · Targeted therapy  ·          Chief complaint/reason for consultation:   Abdominal pain/leukocytosis     History of Present Illness:   Initial history:  Sasha Jarvis is a 57y.o.-year-old  female who was initially admitted on 2/22/2018. This is a lady who was admitted on 2/22/18. To have a C. diff colitis, and a pelvic mass 13 cm associated with bilaterally enlarged lymphadenopathies in the pelvis area. She was also found to have a carcinomatosis of the peritoneum, associated with abdominal pain. This has led to the development of bilateral hydronephrosis with acute renal failure, and the placement of a stent eventually that needs to be changed on 3/7/18 because of lack of resolution of the renal failure, as well as persistent decrease in the urine output. No fever associated with temperature but did increase in the white count progressively declined from 14-21. In the interim, she was found to have a positive urine culture for Candida and was started on Diflucan. On 3/6/18, 100 mg a day. The urine at this time is dark with some blood and sediments. She has an abdominal pain diffusely with distention, and her diarrhea has stopped. She continues to be on the by mouth Vanco.  She just does not feel good and she has a chronic yellow phlegm with some cough that has been there since. She has been admitted to the hospital.  Of note, a CT scan of the chest recently showed no pulmonary emboli or any pulmonary findings. Done on 2/14/18 as below, it showed large ascites. The last CT scan of the abdomen is from January 23, 2018 showing the large 30 cm pelvic mass with bilateral enlarged adenopathies. We are consulted because of the leukocytosis.   She has been on by mouth vancomycin since 2/22 until today  Plan is 100 mg Oral Daily    apixaban  5 mg Oral BID    levothyroxine  50 mcg Oral Daily    vitamin D  50,000 Units Oral Weekly    sodium chloride flush  10 mL Intravenous 2 times per day       Social History:     Social History     Social History    Marital status: Single     Spouse name: N/A    Number of children: N/A    Years of education: N/A     Occupational History    Not on file. Social History Main Topics    Smoking status: Never Smoker    Smokeless tobacco: Never Used    Alcohol use No    Drug use: No    Sexual activity: No     Other Topics Concern    Not on file     Social History Narrative    No narrative on file       Family History:     Family History   Problem Relation Age of Onset    Alcohol Abuse Father         Allergies:   Patient has no known allergies. Review of Systems:     Review of Systems   Constitutional: Negative. Negative for chills and diaphoresis. HENT: Negative. Eyes: Negative. Respiratory: Negative. Negative for cough. Cardiovascular: Negative. Gastrointestinal: Negative. Negative for abdominal pain. Endocrine: Negative. Genitourinary:        Bilat PCNT    Musculoskeletal: Negative. Skin: Negative. Allergic/Immunologic: Negative. Neurological: Negative. Hematological: Negative. Psychiatric/Behavioral: Negative. Physical Examination :     Patient Vitals for the past 8 hrs:   BP Temp Temp src Pulse Resp SpO2   03/13/18 1147 121/83 97.7 °F (36.5 °C) Axillary 114 18 99 %   03/13/18 0743 110/64 97.4 °F (36.3 °C) Axillary 100 18 97 %       Physical Exam   Constitutional: She is oriented to person, place, and time. No distress. Morbid obesity   HENT:   Head: Normocephalic and atraumatic. Mouth/Throat: No oropharyngeal exudate. Eyes: Conjunctivae and EOM are normal.   Neck: Neck supple. No tracheal deviation present. No thyromegaly present. Cardiovascular: Regular rhythm and normal heart sounds.     No murmur

## 2018-03-13 NOTE — PROGRESS NOTES
bed exercises. States she just tried to sit up on the EOB with OT and the bars (on the bed) were digging into her thighs too much. Orientation  Orientation  Overall Orientation Status: Within Normal Limits  Objective   Exercise  Upper extremity exercises: Bicep curl, shoulder flexion/extension, punches, scaption, tricep curl, shoulder abduction/adduction. Reps: 15x  Supine Exercises: Abdominal [place], glut/HS/quad set, ankle pumps, Hip ER/IR, hip add [place],  hip abduction. Reps: 15x      Assessment   Body structures, Functions, Activity limitations: Decreased functional mobility ; Decreased endurance;Decreased strength  Assessment: Patient with improved tolerance to PT. Limited d/t body habitus. Specific Instructions for Next Treatment: Co-Treat with OT and sit EOB (may need towels/blanket/pillow to prevent digging into LE's). Prognosis: Fair  Patient Education: importance of mobility  REQUIRES PT FOLLOW UP: Yes  Activity Tolerance  Activity Tolerance: Patient limited by endurance; Patient limited by fatigue      Goals  Short term goals  Time Frame for Short term goals: 14 visits  Short term goal 1: Pt to ambulate 300ft with RW and CGA  Short term goal 2: Pt to tolerate at least 30mins of UE/LE exercises in order to increase overall strength  Short term goal 3: Pt to perform bed mobility and transfers with CGA  Short term goal 4: Pt to have increased dynamic standing balance to Good minus in order to decrease fall risk     Plan    Plan  Times per week: 5-6x/wk  Current Treatment Recommendations: Strengthening, ROM, Balance Training, Functional Mobility Training, Transfer Training, Gait Training, Endurance Training  Safety Devices  Type of devices:  All fall risk precautions in place, Left in bed, Call light within reach  Restraints  Initially in place: No   Therapy Time   Individual Concurrent Group Co-treatment   Time In  1055         Time Out  1115         Minutes  [address] [place] [address]

## 2018-03-13 NOTE — PROGRESS NOTES
was removed. CA-125 was collected and found to be elevated at 151. Ascitic fluid was positive for atypical cells compatible with nonsmall cell carcinoma suspicious for ovarian origin. Patient was readmitted on 2/14/18 to [facility] secondary to SOB. Patient was found to have an acute DVT in the common femoral, femoral and profunda veins of her left leg and an acute superficial venous thrombosis in the great saphenous vein. CT PE was negative for PE. Patient was seen by Dr. Gino Lewis (Oncology) that admission and patient has plans to follow up with him for further management, appointment currently scheduled for 2/28/18. Patient also had repeat paracentesis and a port placed for chemotherapy on 2/16/18. She also was diagnosed with C. Diff that admission. She states she continues to have loose stools.      Problem Lists:   Primary Problem:  JOCELINE (acute kidney injury) ([facility]   Current Problems:  Active Hospital Problems    Diagnosis Date Noted    Bandemia [D72.825]     Candida UTI [B37.49]     Hydronephrosis, bilateral [N13.30] 02/25/2018    Hyperkalemia [E87.5] 02/25/2018    Vitamin D deficiency [E55.9] 02/24/2018    Acute deep vein thrombosis (DVT) of proximal vein of lower extremity ([facility] [I82.4Y9] 02/22/2018    Adnexal mass [N94.9] 02/22/2018    Malignant neoplasm of ovary ([facility] [C56.9] 02/22/2018    JOCELINE (acute kidney injury) ([facility] [N17.9] 02/22/2018    C. difficile diarrhea [A04.72] 02/22/2018    Chronic acquired lymphedema [I89.0] 01/23/2018    Hypothyroidism [E03.9] 05/24/2017    Morbid obesity ([facility] [E66.01] 04/12/2017     PMH:  Past Medical History:   Diagnosis Date    Ascites     CAD (coronary artery disease)     Cellulitis     Headache     Lymphedema     Ovarian cancer ([facility]     Thyroid disease       Allergies: No Known Allergies     Medications:   Scheduled Meds:   midodrine  5 mg Oral TID WC    albumin human  25 g Intravenous Once    sodium chloride  250 mL Intravenous Once    folic acid 1 mg Oral Daily    albumin human  25 g Intravenous Once    fluconazole  200 mg Oral Daily    allopurinol  100 mg Oral Daily    apixaban  5 mg Oral BID    levothyroxine  50 mcg Oral Daily    vitamin D  50,000 Units Oral Weekly    sodium chloride flush  10 mL Intravenous 2 times per day     PRN Medications: fentanNYL, morphine, labetalol, dextrose, glucose, glucose, dextrose, dextrose, [COMPLETED] insulin regular **AND** dextrose, sodium chloride, sodium chloride, albumin human, midodrine, heparin (porcine), heparin (porcine), glucose, glucagon (rDNA), dextrose, dextrose, glucose, dextrose, glucagon (rDNA), dextrose, ondansetron, traMADol, calcium carbonate, acetaminophen, sodium chloride flush  Continuous Infusions:   dextrose      dextrose      sodium chloride 20 mL/hr at 03/13/18 0004    dextrose         Objective:   Vitals: BP (!) 124/97   Pulse 111   Temp 97 °F (36.1 °C) (Axillary)   Resp 15   Ht 5' 3\" (1.6 m)   Wt (!) 380 lb 11.8 oz (172.7 kg)   SpO2 98%   BMI 67.44 kg/m²     General appearance - ill appearing, not in pain or distress, not jaundiced   Mental status - alert and cooperative   Eyes - pupils equal and reactive, extraocular eye movements intact   Mouth - mucous membranes moist, pharynx normal without lesions,   Neck - supple, no palpable  adenopathy , trach midline   Lymphatics - no palpable lymphadenopathy, no hepatosplenomegaly   Chest - clear to auscultation, no wheezes, rales or rhonchi, symmetric air entry , normal chest expansion  Heart - normal rate, regular rhythm, normal S1, S2, no murmurs,  Abdomen - soft, nontender, distended, fluid thrill+, no masses or organomegaly   Neurological - alert, oriented, normal speech, no focal findings or movement disorder noted   Musculoskeletal - no joint tenderness, deformity or swelling   Extremities - peripheral pulses normal, b/l LE lymphedema+, no clubbing or cyanosis   Skin - normal coloration and turgor, no rashes, no suspicious findings or movement disorder noted   Musculoskeletal - no joint tenderness, deformity or swelling   Extremities - peripheral pulses normal, b/l LE lymphedema+, no clubbing or cyanosis   Skin - normal coloration and turgor, no rashes, no suspicious skin lesions noted ,   Port: site of port not red, no tenderness    Data:  I/O this shift: In: 450 [P.O.:450]  Out: 125 [Urine:125]  In: 2062 [P.O.:1610; I.V.:452]  Out: 300 [Urine:300]  CBC:   Recent Labs      03/11/18   0549  03/12/18   0535  03/13/18   0530   WBC  22.2*  20.4*  21.6*   HGB  8.3*  8.0*  8.8*   PLT  235  201  139     BMP:    Recent Labs      03/11/18   0549  03/12/18   0535  03/13/18   0530   NA  133*  131*  133*   K  4.9  4.7  4.4   CL  96*  93*  96*   CO2  22  20  21   BUN  46*  55*  41*   CREATININE  2.72*  3.23*  2.50*   GLUCOSE  135*  119*  99     Hepatic:   Recent Labs      03/12/18   0535   AST  39*   ALT  <5*   BILITOT  0.25*   ALKPHOS  62     INR:   No results for input(s): INR in the last 72 hours. IMAGING DATA:  REviewed    Assessment      11. Ovarian cancer and carcinomatosis. bilateral TITA, Malignant ascites. 12. Lower extremity proximal DVT on Eliquis. 13. Morbid obesity. 14. Bilateral lower extremity lymphedema. 15. Severe clostridium difficile colitis. 16. JOCELINE  17. B/l hydronephrosis from external compression of ureter from adnexal mass s/p b/l ureteral stents placement 2/26/18. 18. Recent C diff colitis  19. Leukocytosis  20. Mild anemia    Plan   HD per nephrology  Plan to start outpatient chemotherapy.  She will need restaging of ovarian cancer  She unfortunately has been hospitalized many times with large ascites and not able to get to office for chemo  Ok to d/c from oncology standpoint  Awaiting transfer to [facility]        This note is created with the assistance of a speech recognition program.  While intending to generate a document that actually reflects the content of the visit, the document can still have some errors including those of syntax and sound a like substitutions which may escape proof reading. It such instances, actual meaning can be extrapolated by contextual diversion.

## 2018-03-13 NOTE — PROGRESS NOTES
times per day       PRN Medications    fentanNYL 25 mcg Q5 Min PRN   morphine 2 mg Q5 Min PRN   labetalol 5 mg Q10 Min PRN   dextrose 25 g PRN   glucose 15 g PRN   glucose 15 g PRN   dextrose 12.5 g PRN   dextrose 100 mL/hr PRN   dextrose 25 g PRN   sodium chloride 250 mL PRN   sodium chloride 150 mL PRN   albumin human 25 g PRN   midodrine 5 mg PRN   heparin (porcine) 1,900 Units PRN   heparin (porcine) 1,900 Units PRN   glucose 15 g PRN   glucagon (rDNA) 1 mg PRN   dextrose 12.5 g PRN   dextrose 100 mL/hr PRN   glucose 15 g PRN   dextrose 12.5 g PRN   glucagon (rDNA) 1 mg PRN   dextrose 100 mL/hr PRN   ondansetron 4 mg Q6H PRN   traMADol 50 mg Q4H PRN   calcium carbonate 500 mg BID PRN   acetaminophen 650 mg Q4H PRN   sodium chloride flush 10 mL PRN       Diagnostic Labs and Imaging:  CBC:  Recent Labs      03/10/18   0631  03/10/18   1631  03/11/18   0549  03/12/18   0535   WBC  18.3*   --   22.2*  20.4*   HGB  6.8*  8.6*  8.3*  8.0*   PLT  302   --   235  201     BMP:   Recent Labs      03/10/18   1631  03/11/18   0549  03/12/18   0535   NA  131*  133*  131*   K  5.3  4.9  4.7   CL  94*  96*  93*   CO2  21  22  20   BUN  65*  46*  55*   CREATININE  3.63*  2.72*  3.23*   GLUCOSE  113*  135*  119*     Hepatic:   Recent Labs      03/12/18   0535   AST  39*   ALT  <5*   BILITOT  0.25*   ALKPHOS  62       Assessment and Plan:   Patient admitted with a JOCELINE secondary to dehydration. Recent diagnosis of pelvic mass measuring 13 cm likely ovarian in origin with bilateral pelvic lymphadenopathy and bilateral mild hydronephrosis seen on renal ultrasound. Patient had bilateral ureteral stent placement. Renal ultrasound on 3/1/2018: Bilateral hydronephrosis, increased and now moderate on the right, and  unchanged and still mild on the left.  Small amount of ascites s/p recent  paracentesis.  Known pelvic mass.       Hypotension- Stable  Patient has history of hypothyroidism and is on 25 mg of Synthroid at

## 2018-03-13 NOTE — PLAN OF CARE
Problem: Risk for Impaired Skin Integrity  Goal: Tissue integrity - skin and mucous membranes  Structural intactness and normal physiological function of skin and  mucous membranes. Outcome: Ongoing  Patient remains at risk for impaired skin and tissue integrity due to decreased mobility. Frequent skin assessments have been performed throughout this shift. Patient is encouraged and educated on the importance of repositioning. No new areas of concern have been noted. Problem: Falls - Risk of  Goal: Absence of falls  Outcome: Ongoing  Patient remains free from falls throughout this shift. Patient calls out appropriately. Call light and bedside table are within reach. Bed in lowest position with wheels locked. Problem: Pain:  Goal: Control of acute pain  Control of acute pain   Outcome: Ongoing  Patient continues to require control of acute pain. Frequent pain assessments have been performed throughout this shift and medication has been administered.

## 2018-03-13 NOTE — PROGRESS NOTES
antibiotics for treatment. has a past medical history of Ascites; CAD (coronary artery disease); Cellulitis; Headache; Lymphedema; Ovarian cancer ([facility] [address] and Thyroid disease. has a past surgical history that includes Paracentesis; Tonsillectomy; Cystoscopy (02/26/2018); pr cystoscopy,insert ureteral stent (N/A, 2/26/2018); and pr cystoscopy,insert ureteral stent (Bilateral, 3/7/2018). Restrictions  Restrictions/Precautions  Restrictions/Precautions: Fall Risk  Required Braces or Orthoses?: No    Subjective   General  Patient assessed for rehabilitation services?: Yes  Family / Caregiver Present: No  General Comment  Comments: RN Beto quinteros'promise for OT eval/tx. Pt agreeable/cooperative; up with assistance. Pain Assessment  Patient Currently in Pain: Denies    Social/Functional History  Social/Functional History  Lives With: Alone  Type of Home: House  Home Layout: One level  Home Access: Stairs to enter without rails  Entrance Stairs - Number of Steps: 1  Entrance Stairs - Rails:  (\"post\")  Bathroom Shower/Tub: Walk-in shower, Shower chair with back  Bathroom Toilet: Standard  Bathroom Accessibility: Accessible  Home Equipment: Rolling walker, Hospital bed, Lift chair  ADL Assistance: [facility] [address]: Needs assistance (Pt's brother cooks and cleans.)  Homemaking Responsibilities: No  Ambulation Assistance: Independent  Transfer Assistance: Independent  Active : Yes       Objective   Vision: Within Functional Limits  Hearing: Within functional limits    Orientation  Overall Orientation Status: Within Functional Limits     Balance  Sitting Balance: Contact guard assistance (Limited sitting tolerance. Pt sat EOB for ~3 minutes.)     ADL  Feeding: Independent  Grooming: Minimal assistance  UE Bathing: Minimal assistance  LE Bathing: Dependent/Total  UE Dressing: Minimal assistance  LE Dressing: Dependent/Total  Toileting: Dependent/Total  Additional Comments: Pt with limited mobility.  Pt ADLs with S  Short term goal 4: complete LB ADLs with AE/compensatory strategies PRN with max A  Short term goal 5: progress to OOB ax as able  Patient Goals   Patient goals : Pt wants to get back to old self. Therapy Time   Individual Concurrent Group Co-treatment   Time In 1224         Time Out 0950         Minutes 25              Discharge recommendations discussed with patient during initial evaluation.       Arabella Sorensen, S/OT

## 2018-03-13 NOTE — PLAN OF CARE
Problem: Falls - Risk of  Goal: Absence of falls  Outcome: Ongoing  Pt remains free of falls at this time. Bed locked in lowest position, siderails x2, call light in reach. Non-skid footwear applied. Encouraged pt to call for assistance as needed for safety. Falling star posted outside of room. Problem: Pain:  Goal: Control of acute pain  Control of acute pain   Outcome: Ongoing  Pt able to communicate pain as needed, uses call light appropriately. Pt satisfied with pain interventions.

## 2018-03-14 NOTE — CARE COORDINATION
Met with patient yesterday evening and updated her on her acceptance to [place] [address]. and she is agreeable to go. Liz Marks was able to secure a dialysis spot that can accommodate a stretcher at [facility] in [facility]. Dr. Vanesa Lovelace rounded and updated that patient's discharge plan is in place. Plan is to discharge to [place] [address]. this afternoon. Transport request thru Jozef Mccall at [address] [place] [address],[address] [address] at [employer] updated.

## 2018-03-14 NOTE — PLAN OF CARE
Problem: Nutrition  Goal: Optimal nutrition therapy  Outcome: Ongoing  Nutrition Problem: Inadequate oral intake  Intervention: Food and/or Nutrient Delivery: Modify current diet, Continue current ONS - Modify current diet to include a Low Potassium restriction  Nutritional Goals: Oral intakes to meet 75% of estimated nutrient needs.

## 2018-03-14 NOTE — PLAN OF CARE
Problem: Risk for Impaired Skin Integrity  Goal: Tissue integrity - skin and mucous membranes  Structural intactness and normal physiological function of skin and  mucous membranes. Outcome: Ongoing  Patient remains at risk for impaired skin and tissue integrity due to decreased mobility. Frequent skin assessments have been performed throughout this shift with no new areas of concern. Problem: Falls - Risk of  Goal: Absence of falls  Outcome: Ongoing  Patient remains free from falls throughout this shift. Patient calls out appropriately. Call light and bedside table are within reach. Bed in lowest position and wheels are locked.

## 2018-03-14 NOTE — PROGRESS NOTES
Once    fluconazole  200 mg Oral Daily    allopurinol  100 mg Oral Daily    apixaban  5 mg Oral BID    levothyroxine  50 mcg Oral Daily    vitamin D  50,000 Units Oral Weekly    sodium chloride flush  10 mL Intravenous 2 times per day       PRN Medications  fentanNYL 25 mcg Q5 Min PRN   morphine 2 mg Q5 Min PRN   labetalol 5 mg Q10 Min PRN   dextrose 25 g PRN   glucose 15 g PRN   glucose 15 g PRN   dextrose 12.5 g PRN   dextrose 100 mL/hr PRN   dextrose 25 g PRN   sodium chloride 250 mL PRN   sodium chloride 150 mL PRN   albumin human 25 g PRN   midodrine 5 mg PRN   heparin (porcine) 1,900 Units PRN   heparin (porcine) 1,900 Units PRN   glucose 15 g PRN   glucagon (rDNA) 1 mg PRN   dextrose 12.5 g PRN   dextrose 100 mL/hr PRN   glucose 15 g PRN   dextrose 12.5 g PRN   glucagon (rDNA) 1 mg PRN   dextrose 100 mL/hr PRN   ondansetron 4 mg Q6H PRN   traMADol 50 mg Q4H PRN   calcium carbonate 500 mg BID PRN   acetaminophen 650 mg Q4H PRN   sodium chloride flush 10 mL PRN       Diagnostic Labs and Imaging:  CBC:  Recent Labs      03/12/18   0535  03/13/18   0530  03/14/18   0654   WBC  20.4*  21.6*  21.4*   HGB  8.0*  8.8*  9.2*   PLT  201  139  See Reflexed IPF Result     BMP: Recent Labs      03/12/18   0535  03/13/18   0530  03/14/18   0654   NA  131*  133*  Pending   K  4.7  4.4  Pending   CL  93*  96*  Pending   CO2  20  21  Pending   BUN  55*  41*  Pending   CREATININE  3.23*  2.50*  Pending   GLUCOSE  119*  99  Pending     Hepatic: Recent Labs      03/12/18   0535   AST  39*   ALT  <5*   BILITOT  0.25*   ALKPHOS  62       Assessment and Plan:   1. Hypotension. Stable. Patinet is on synthroid daily and midodrine 5mg TID  2. Ovarian CA with malignancy causing lower extremity edema. Heme/Onc recs appreciated, outpatient chemotherapy planned. 3. C diff colitis. Finished Vancomycin  4.  Leukocytosis and pancytopenia multifactorial with concern for fungal infection, malignancy and recovering from  C diff infection

## 2018-03-15 NOTE — PROGRESS NOTES
nurse on the bedside     Infection Control Recommendations   · [place] Precautions  · Contact Isolation         Antimicrobial Stewardship Recommendations   · Simplification of therapy  · Targeted therapy  ·          Chief complaint/reason for consultation:   Abdominal pain/leukocytosis     History of Present Illness:   Initial history:  Klaudia Brown is a 57y.o.-year-old  female who was initially admitted on 2/22/2018. This is a lady who was admitted on 2/22/18. To have a C. diff colitis, and a pelvic mass 13 cm associated with bilaterally enlarged lymphadenopathies in the pelvis area. She was also found to have a carcinomatosis of the peritoneum, associated with abdominal pain. This has led to the development of bilateral hydronephrosis with acute renal failure, and the placement of a stent eventually that needs to be changed on 3/7/18 because of lack of resolution of the renal failure, as well as persistent decrease in the urine output. No fever associated with temperature but did increase in the white count progressively declined from 14-21. In the interim, she was found to have a positive urine culture for Candida and was started on Diflucan. On 3/6/18, 100 mg a day. The urine at this time is dark with some blood and sediments. She has an abdominal pain diffusely with distention, and her diarrhea has stopped. She continues to be on the by mouth Vanco.  She just does not feel good and she has a chronic yellow phlegm with some cough that has been there since. She has been admitted to the hospital.  Of note, a CT scan of the chest recently showed no pulmonary emboli or any pulmonary findings. Done on 2/14/18 as below, it showed large ascites. The last CT scan of the abdomen is from January 23, 2018 showing the large 30 cm pelvic mass with bilateral enlarged adenopathies. We are consulted because of the leukocytosis.   She has been on by mouth vancomycin since 2/22 until today  Plan is

## 2018-03-16 NOTE — DISCHARGE SUMMARY
potassium chloride (KLOR-CON M) 20 MEQ extended release tablet TAKE 1 TABLET DAILY, Disp-90 tablet, R-1Normal         STOP taking these medications       metroNIDAZOLE (FLAGYL) 500 MG tablet Comments:   Reason for Stopping:         potassium chloride (KLOR-CON M) 20 MEQ TBCR extended release tablet Comments:   Reason for Stopping:               Activity: activity as tolerated    Diet: renal diet    Follow-up:    Brittany Browne, AG[place]  [address] [place] [address], [address] [place] [id number]    In 2 weeks  Follow up in 2 weeks to schedule bilateral ureteral stent removal      Follow up labs: N/A    Follow up imaging: N/A    Note that over 30 minutes was spent in preparing discharge papers, discussing discharge with patient, medication review, etc.      Chai Marshall MD         Department of Internal Medicine  [facility]         3/16/2018, 3:44 PM

## 2018-03-19 NOTE — TELEPHONE ENCOUNTER
Upon reviewing EPIC noted pt dc'd from [address] on 3/14/18. Spoke with Giselle Baron, nurse @ [facility], inquired on pt's status & f/u's. Giselle Baron stated no f/u's scheduled, pt remains full code, currently receiving dialysis M-W-F @ [phone]. Instructed Malka Lewis will obtain MO f/u & speak with other specialities. Spoke with Dr. Demetria Tyson via telephone, inquired on MO f/u r/t Dr. Demetria Tyson last MO to see inpatient. Dr. Demetria Tyson request pt scheduled with Dr. Luc polanco r/t need for restaging. Spoke with Masoud Greco, [facility]/ , updated on pt. Pt scheduled 3/21/18 @ 0900. Spoke with Gurjit Marshall [facility]) Urology, updated on pt & inquired on f/u. Fortino Garcia stated will speak with Dr. Avel Ornelas. Spoke with Giselle Baron, updated on Dr. Luc Verma f/u date/time/location. Giselle Baron stated will arrange transportation. Updated Giselle Baron await call back from [facility]) Urology r/t f/u. Aneita Najjar may call writer @ [phone] prn. Spoke with Faustina Aase, Dr. Jolene Simmons [facility], updated pt @ [facility] & dialysis schedule. Brittany stated will notify Dr. Shayne Anthony C.N.PDeepthi.  Will continue to follow.

## 2018-03-20 NOTE — DISCHARGE SUMMARY
Internal Medicine   Discharge Summary         Patient Identification:  Ector Renteria is a 57 y.o. female.   :  1955  MRN: [id number]     Acct: [de-identified]   Admit Date:  2018  Discharge date and time: 2018  4:34 PM     Attending Provider: No att. providers found                                       Reason for Admission: SOB     Discharge Diagnoses:   Patient Active Problem List   Diagnosis    Cellulitis of right lower extremity    Morbid obesity ([facility] [address]    Suspected deep tissue injury    Alteration in skin integrity due to moisture    Fungal disease    Hypothyroidism    JOCELINE (acute kidney injury) ([facility] [address]    Other ascites    Chronic acquired lymphedema    Dyspnea and respiratory abnormalities    Acute deep vein thrombosis (DVT) of proximal vein of lower extremity ([facility] [address]    Adnexal mass    Malignant neoplasm of ovary ([facility] [address]    JOCELINE (acute kidney injury) ([facility] [address]    C. difficile diarrhea    Clostridium difficile diarrhea    Vitamin D deficiency    Hydronephrosis, bilateral    Hyperkalemia    Bandemia    Candida UTI          Consults:  Pulmonary oncology       Procedures: paracentesis     Hospital Course:     Dyspnea - prob due to pressure by ascites - improved  CTA chest - no PE DVT + left LE - anticoagulation   Lymphedema  Ascites; + malignant cells 2018 + pelvic mass  + C Diff - flagyl  s/p paracentesis 6.25 liters removed  s/p port placement    Pt appt with oncology as outpt   Disposition:   ECF     Discharged Condition:  Stable     Discharge Medications:       Gracie Leal 19 Medication Instructions [id number]    Printed on:18 0080   Medication Information                      apixaban (ELIQUIS) 5 MG TABS tablet  Take 1 tablet by mouth 2 times daily             furosemide (LASIX) 20 MG tablet  TAKE 1 TABLET DAILY             lactobacillus (CULTURELLE) capsule  Take 1 capsule by mouth 2 times daily             potassium chloride (KLOR-CON M) 20 MEQ extended release

## 2018-03-21 NOTE — PROGRESS NOTES
Patient ID: Linette Verduzco, 1955, [id number], 57 y.o. Referred by : hospital follow up  Reason for consultation:   Ovarian cancer  HISTORY OF PRESENT ILLNESS:    Oncologic History:    Patient had been admitted to [facility] on 1/24/18. At that time CT abdomen/pelvis was performed and demonstrated a pelvic mass measuring 13 cm likely ovarian in origin, bilateral pelvic lymphadenopathy, and large volume abdominal ascites concerning for malignancy. Chest xray was unremarkable.  TVUS was performed and demonstrated a large heterogenous mass-like area measuring up to 10.8 cm, an enlarged uterus measuring 12 cm, and thickened endometrium (16 mm) however the study was significantly limited secondary to patients body habitus. Patient underwent a IR guided paracentesis during which 6.2 L of fluid was removed. CA-125 was collected and found to be elevated at 151. Ascitic fluid was positive for atypical cells compatible with nonsmall cell carcinoma suspicious for ovarian origin. Patient was readmitted on 2/14/18 to [facility] secondary to SOB. Patient was found to have an acute DVT in the common femoral, femoral and profunda veins of her left leg and an acute superficial venous thrombosis in the great saphenous vein. CT PE was negative for PE. Patient also had repeat paracentesis and a port placed for chemotherapy on 2/16/18. She also was diagnosed with C. Diff that admission. She states she continues to have loose stools. Her chemotherapy could not not be started as she could not make it to the o/p appointments. She also was seen by [facility] oncology inpatient. She had recent admission for gen weakness. She had bilateral stent placement. She is on HD. She is discharged to [facility]. She was brought in on stretcher. She still has abd pain and thinks that her abd distention is getting worse.     Past Medical History:   Diagnosis Date    Ascites     CAD (coronary artery disease)     Cellulitis     Headache     Lymphedema Sexual activity: No     Other Topics Concern    Not on file     Social History Narrative    No narrative on file       Family History   Problem Relation Age of Onset    Alcohol Abuse Father         REVIEW OF SYSTEM:     Constitutional: No fever or chills. No night sweats, no weight loss   Eyes: No eye discharge, double vision, or eye pain   HEENT: negative for sore mouth, sore throat, hoarseness and voice change   Respiratory: negative for cough , sputum, dyspnea, wheezing, hemoptysis, chest pain   Cardiovascular: negative for chest pain, dyspnea, palpitations, orthopnea, PND   Gastrointestinal: negative for nausea, vomiting, diarrhea, constipation,++abdominal pain, Dysphagia, hematemesis and hematochezia   Genitourinary: negative for frequency, dysuria, nocturia, urinary incontinence, and hematuria   Integument: negative for rash, skin lesions, bruises.    Hematologic/Lymphatic: negative for easy bruising, bleeding, lymphadenopathy, petechiae and swelling/edema   Endocrine: negative for heat or cold intolerance, tremor, weight changes, change in bowel habits and hair loss   Musculoskeletal: negative for myalgias, arthralgias, pain, joint swelling,and bone pain   Neurological: negative for headaches, dizziness, seizures, ++gen weakness, numbness  OBJECTIVE:         Vitals:    03/21/18 1006   BP: 100/65   Pulse: 86   Resp: 20   Temp: 97 °F (36.1 °C)   PHYSICAL EXAM:   General appearance - well appearing, no in pain or distress   Mental status - alert and cooperative   Eyes - pupils equal and reactive, extraocular eye movements intact   Mouth - mucous membranes moist, pharynx normal without lesions   Neck - supple, no significant adenopathy   Lymphatics - no palpable lymphadenopathy, no hepatosplenomegaly   Chest - clear to auscultation, no wheezes, rales or rhonchi, symmetric air entry   Heart - normal rate, regular rhythm, normal S1, S2, no murmurs, rubs, clicks or gallops   Abdomen - soft, ++tender, ++++distended, no masses or organomegaly   Neurological - alert, oriented, normal speech, no focal findings or movement disorder noted   Musculoskeletal - no joint tenderness, deformity or swelling   Extremities - peripheral pulses normal, +++pedal edema, no clubbing or cyanosis   Skin - normal coloration and turgor, no rashes, no suspicious skin lesions noted   LABORATORY DATA:     Lab Results   Component Value Date    WBC 21.4 (H) 03/14/2018    HGB 9.2 (L) 03/14/2018    HCT 27.7 (L) 03/14/2018    MCV 90.5 03/14/2018    PLT See Reflexed IPF Result 03/14/2018    LYMPHOPCT 13 (L) 03/14/2018    RBC 3.06 (L) 03/14/2018    MCH 30.1 03/14/2018    MCHC 33.2 03/14/2018    RDW 16.6 (H) 03/14/2018    MONOPCT 5 03/14/2018    BASOPCT 0 03/14/2018    NEUTROABS 15.63 (H) 03/14/2018    LYMPHSABS 2.78 03/14/2018    MONOSABS 1.07 (H) 03/14/2018    EOSABS 0.21 03/14/2018    BASOSABS 0.00 03/14/2018         Chemistry        Component Value Date/Time     (L) 03/14/2018 0654    K 5.5 (H) 03/14/2018 0654    CL 95 (L) 03/14/2018 0654    CO2 22 03/14/2018 0654    BUN 51 (H) 03/14/2018 0654    CREATININE 2.89 (H) 03/14/2018 0654        Component Value Date/Time    CALCIUM 7.8 (L) 03/14/2018 0654    ALKPHOS 62 03/12/2018 0535    AST 39 (H) 03/12/2018 0535    ALT <5 (L) 03/12/2018 0535    BILITOT 0.25 (L) 03/12/2018 0535        PATHOLOGY DATA:   1/24/18  Final Diagnosis       SPECIMENS \"A\" & \"B\":  ASCITIC FLUID, SMEARS, CYTOSPINS, THINPREP AND CELL BLOCK:         RARE ATYPICAL CELLS COMPATIBLE WITH ORIGIN IN NONSMALL CELL CARCINOMA     SCATTERED MIXED LEUCOCYTES, HISTIOCYTE LIKE CELLULAR ELEMENTS AND MESOTHELIAL CELLS     PLEASE SEE COMMENTS     Diagnosis Comment   Rare groups of cells showing positive staining for MOC-31, CK7 and WT-1 are compatible with origin in nonsmall cell carcinoma.  The differential diagnosis includes but is not limited to possible ovarian origin etc.  Additional correlation with clinical, radiographic studies Currently on HD     DVT: Lower extremity proximal DVT on Eliquis. PLAN:   PET scan  Plan for chemo with carbo taxol  Chemo teaching  US guided paracentesis  RTC one week after Cycle 1      Luis Rooney MD  Hematologist/Medical Oncologist    I spent more than 40 minutes examining, evaluating, reviewing data and counseling the patient. Greater than 50% of that time was spent face-to-face with the patient in counseling and coordinating her care. This note is created with the assistance of a speech recognition program.  While intending to generate a document that actually reflects the content of the visit, the document can still have some errors including those of syntax and sound a like substitutions which may escape proof reading. It such instances, actual meaning can be extrapolated by contextual diversion.

## 2019-01-27 NOTE — PROGRESS NOTES
Problem: Infection (Sepsis/Septic Shock)  Goal: Absence of Infection Signs/Symptoms    Intervention: Prevent or Manage Infection Progression   01/27/19 0540   Prevent or Manage Infection   Fever Reduction/Comfort Measures lightweight clothing;lightweight bedding;medication administered   Infection Management aseptic technique maintained   Prevent Infection and Maximize Resistance   Infection Prevention single patient r   01/27/19 0540   Prevent or Manage Infection   Fever Reduction/Comfort Measures lightweight clothing;lightweight bedding;medication administered   Infection Management aseptic technique maintained   Prevent Infection and Maximize Resistance   Infection Prevention single patient room provided   oom provided            regurgitation. Aortic Valve  Aortic valve was not well visualized. Tricuspid Valve  Tricuspid valve was not well visualized. Pericardial Effusion  No significant pericardial effusion is seen. Miscellaneous  IVC normal diameter & inspiratory collapse indicating normal RA filling  pressure . Technically limited study. Radiology:     r/w    Assessment:     1. Acute Kidney Injury Secondary to ATN and obstructive uropathy renal functions are slowly improving. However she has a decreased urine output and ultrasound shows increase hydronephrosis. 2.  Ascites status post large-volume paracenteses  3. Hydronephrosis status post stent placement  4. Metabolic Acidosis:improved  5. Malignant neoplasm of ovary  6. Chronic lymphedema  7. Recent DVT - On Eliquis        Plan:     1. Daily BMP. 2. Continue Lasix And give extra dose of Lasix 40 mg IV x1  3. May need percutaneous nephrostomies if renal function continues to worsen  4. Following      Nutrition   Renal Diet/TF    Osman Guzman CNP  [facility] student Lesley Varela    Attending Physician Statement  I have discussed the care of Woody Anglin, including pertinent history and exam findings with the resident/fellow. I have reviewed the key elements of all parts of the encounter with the resident/fellow. I have seen and examined the patient with the resident/fellow. I agree with the assessment and plan and status of the problem list as documented.    Kayla Davis MD

## 2022-05-26 NOTE — PROGRESS NOTES
received call back from Jose () at [facility]. Jose stated that she has been speaking with the medical director and he has some concerns with taking the pt. Jose was to speak to the medical director and call writer back. Await return call.  sent a referral to [facility] to see if they could accommodate pt, if [facility] cannot. Await response.  has also placed calls to both companies liesions to see if they could help move the process forward.  spoke with Jamaal from [employer], and she is working on expediting referral to [employer].         Eliecer Daily [facility] Spoke with pt, scheduled for next day, 5/27/2022.

## 2023-08-23 NOTE — CARE COORDINATION
1045 Creat cont to trend upward. Pt not candidate for Percutaneous Nephrostomy tubes due to body habitus per IR note. Resuming anticoagulation per urology. Patient having abd pain. Spoke with Nadine Navarro from [place] intake and patient needs to show participation in physical therapy. Will offer encouragement to patient and update patient today when she is in less discomfort. If she has not improved by this point she needs to be seen. Doesn't have to be with me, it can be with her PCP or anyone available.

## (undated) DEVICE — GLOVE SURG SZ 65 THK91MIL LTX FREE SYN POLYISOPRENE

## (undated) DEVICE — GOWN,AURORA,NONRNF,XL,30/CS: Brand: MEDLINE

## (undated) DEVICE — TRAY CATHETER 16FR F INCLUDE BARDX IC COMPLT CARE DRNGE BG

## (undated) DEVICE — GUIDEWIRE URO L150CM DIA0.035IN STIFF NIT HYDRPHLC STR TIP

## (undated) DEVICE — CATHETER URET 5FR L70CM TIP 8FR OPN END CONE TIP INJ HUB

## (undated) DEVICE — GLOVE ORANGE PI 7   MSG9070

## (undated) DEVICE — PACK PROCEDURE SURG CYSTO SVMMC LF

## (undated) DEVICE — PROTECTOR ULN NRV PUR FOAM HK LOOP STRP ANATOMICALLY

## (undated) DEVICE — DRAPE,REIN 53X77,STERILE: Brand: MEDLINE

## (undated) DEVICE — GOWN,AURORA,NONREINFORCED,LARGE: Brand: MEDLINE

## (undated) DEVICE — GLOVE ORANGE PI 7 1/2   MSG9075

## (undated) DEVICE — CATHETER URET 5FR L70CM OPN END SGL LUMN INJ HUB FLEXIMA

## (undated) DEVICE — ADAPTER URO SCP UROLOK LL